# Patient Record
Sex: FEMALE | Race: WHITE | NOT HISPANIC OR LATINO | Employment: UNEMPLOYED | URBAN - METROPOLITAN AREA
[De-identification: names, ages, dates, MRNs, and addresses within clinical notes are randomized per-mention and may not be internally consistent; named-entity substitution may affect disease eponyms.]

---

## 2018-09-05 ENCOUNTER — APPOINTMENT (EMERGENCY)
Dept: RADIOLOGY | Facility: HOSPITAL | Age: 53
End: 2018-09-05
Payer: SUBSIDIZED

## 2018-09-05 ENCOUNTER — HOSPITAL ENCOUNTER (EMERGENCY)
Facility: HOSPITAL | Age: 53
Discharge: HOME | End: 2018-09-05
Attending: EMERGENCY MEDICINE | Admitting: EMERGENCY MEDICINE
Payer: SUBSIDIZED

## 2018-09-05 VITALS
HEART RATE: 78 BPM | RESPIRATION RATE: 22 BRPM | TEMPERATURE: 97.7 F | WEIGHT: 115 LBS | OXYGEN SATURATION: 95 % | HEIGHT: 62 IN | SYSTOLIC BLOOD PRESSURE: 133 MMHG | BODY MASS INDEX: 21.16 KG/M2 | DIASTOLIC BLOOD PRESSURE: 78 MMHG

## 2018-09-05 DIAGNOSIS — J44.1 COPD EXACERBATION (HCC): Primary | ICD-10-CM

## 2018-09-05 LAB
ANION GAP SERPL CALCULATED.3IONS-SCNC: 14 MMOL/L (ref 4–13)
BASOPHILS # BLD AUTO: 0.06 THOUSANDS/ΜL (ref 0–0.1)
BASOPHILS NFR BLD AUTO: 1 % (ref 0–1)
BUN SERPL-MCNC: 8 MG/DL (ref 5–25)
CALCIUM SERPL-MCNC: 8.6 MG/DL (ref 8.3–10.1)
CHLORIDE SERPL-SCNC: 102 MMOL/L (ref 100–108)
CO2 SERPL-SCNC: 23 MMOL/L (ref 21–32)
CREAT SERPL-MCNC: 0.43 MG/DL (ref 0.6–1.3)
EOSINOPHIL # BLD AUTO: 0.19 THOUSAND/ΜL (ref 0–0.61)
EOSINOPHIL NFR BLD AUTO: 2 % (ref 0–6)
ERYTHROCYTE [DISTWIDTH] IN BLOOD BY AUTOMATED COUNT: 12.5 % (ref 11.6–15.1)
GFR SERPL CREATININE-BSD FRML MDRD: 116 ML/MIN/1.73SQ M
GLUCOSE SERPL-MCNC: 88 MG/DL (ref 65–140)
HCT VFR BLD AUTO: 42.1 % (ref 34.8–46.1)
HGB BLD-MCNC: 13.8 G/DL (ref 11.5–15.4)
IMM GRANULOCYTES # BLD AUTO: 0.02 THOUSAND/UL (ref 0–0.2)
IMM GRANULOCYTES NFR BLD AUTO: 0 % (ref 0–2)
LYMPHOCYTES # BLD AUTO: 3.4 THOUSANDS/ΜL (ref 0.6–4.47)
LYMPHOCYTES NFR BLD AUTO: 38 % (ref 14–44)
MCH RBC QN AUTO: 33.5 PG (ref 26.8–34.3)
MCHC RBC AUTO-ENTMCNC: 32.8 G/DL (ref 31.4–37.4)
MCV RBC AUTO: 102 FL (ref 82–98)
MONOCYTES # BLD AUTO: 0.61 THOUSAND/ΜL (ref 0.17–1.22)
MONOCYTES NFR BLD AUTO: 7 % (ref 4–12)
NEUTROPHILS # BLD AUTO: 4.61 THOUSANDS/ΜL (ref 1.85–7.62)
NEUTS SEG NFR BLD AUTO: 52 % (ref 43–75)
NRBC BLD AUTO-RTO: 0 /100 WBCS
PLATELET # BLD AUTO: 277 THOUSANDS/UL (ref 149–390)
PMV BLD AUTO: 9.8 FL (ref 8.9–12.7)
POTASSIUM SERPL-SCNC: 4 MMOL/L (ref 3.5–5.3)
RBC # BLD AUTO: 4.12 MILLION/UL (ref 3.81–5.12)
SODIUM SERPL-SCNC: 139 MMOL/L (ref 136–145)
TROPONIN I SERPL-MCNC: <0.02 NG/ML
WBC # BLD AUTO: 8.89 THOUSAND/UL (ref 4.31–10.16)

## 2018-09-05 PROCEDURE — 96374 THER/PROPH/DIAG INJ IV PUSH: CPT

## 2018-09-05 PROCEDURE — 94640 AIRWAY INHALATION TREATMENT: CPT

## 2018-09-05 PROCEDURE — 99284 EMERGENCY DEPT VISIT MOD MDM: CPT

## 2018-09-05 PROCEDURE — 85025 COMPLETE CBC W/AUTO DIFF WBC: CPT | Performed by: EMERGENCY MEDICINE

## 2018-09-05 PROCEDURE — 93005 ELECTROCARDIOGRAM TRACING: CPT

## 2018-09-05 PROCEDURE — 84484 ASSAY OF TROPONIN QUANT: CPT | Performed by: EMERGENCY MEDICINE

## 2018-09-05 PROCEDURE — 36415 COLL VENOUS BLD VENIPUNCTURE: CPT | Performed by: EMERGENCY MEDICINE

## 2018-09-05 PROCEDURE — 80048 BASIC METABOLIC PNL TOTAL CA: CPT | Performed by: EMERGENCY MEDICINE

## 2018-09-05 PROCEDURE — 71045 X-RAY EXAM CHEST 1 VIEW: CPT

## 2018-09-05 RX ORDER — METHYLPREDNISOLONE SODIUM SUCCINATE 125 MG/2ML
125 INJECTION, POWDER, LYOPHILIZED, FOR SOLUTION INTRAMUSCULAR; INTRAVENOUS ONCE
Status: COMPLETED | OUTPATIENT
Start: 2018-09-05 | End: 2018-09-05

## 2018-09-05 RX ORDER — PREDNISONE 20 MG/1
40 TABLET ORAL DAILY
Qty: 10 TABLET | Refills: 0 | Status: SHIPPED | OUTPATIENT
Start: 2018-09-05 | End: 2018-09-10

## 2018-09-05 RX ORDER — IPRATROPIUM BROMIDE AND ALBUTEROL SULFATE 2.5; .5 MG/3ML; MG/3ML
3 SOLUTION RESPIRATORY (INHALATION) ONCE
Status: COMPLETED | OUTPATIENT
Start: 2018-09-05 | End: 2018-09-05

## 2018-09-05 RX ORDER — ALBUTEROL SULFATE 90 UG/1
2 AEROSOL, METERED RESPIRATORY (INHALATION) EVERY 6 HOURS PRN
Qty: 6.7 G | Refills: 0 | Status: SHIPPED | OUTPATIENT
Start: 2018-09-05 | End: 2021-05-28 | Stop reason: SDUPTHER

## 2018-09-05 RX ORDER — ALBUTEROL SULFATE 90 UG/1
2 AEROSOL, METERED RESPIRATORY (INHALATION) ONCE
Status: COMPLETED | OUTPATIENT
Start: 2018-09-05 | End: 2018-09-05

## 2018-09-05 RX ADMIN — IPRATROPIUM BROMIDE AND ALBUTEROL SULFATE 3 ML: .5; 3 SOLUTION RESPIRATORY (INHALATION) at 18:09

## 2018-09-05 RX ADMIN — METHYLPREDNISOLONE SODIUM SUCCINATE 125 MG: 125 INJECTION, POWDER, FOR SOLUTION INTRAMUSCULAR; INTRAVENOUS at 18:09

## 2018-09-05 RX ADMIN — ALBUTEROL SULFATE 2 PUFF: 90 AEROSOL, METERED RESPIRATORY (INHALATION) at 20:20

## 2018-09-05 NOTE — ED NOTES
Patient phoned family members for a ride home  ER MD spoke with patient in regards to being discharged to home       Luciano Morin RN  09/05/18 5335
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
## 2018-09-06 LAB
ATRIAL RATE: 70 BPM
P AXIS: 77 DEGREES
PR INTERVAL: 158 MS
QRS AXIS: 16 DEGREES
QRSD INTERVAL: 66 MS
QT INTERVAL: 430 MS
QTC INTERVAL: 464 MS
T WAVE AXIS: 54 DEGREES
VENTRICULAR RATE: 70 BPM

## 2018-09-06 PROCEDURE — 93010 ELECTROCARDIOGRAM REPORT: CPT | Performed by: INTERNAL MEDICINE

## 2018-09-06 NOTE — DISCHARGE INSTRUCTIONS
COPD (Chronic Obstructive Pulmonary Disease)   WHAT YOU NEED TO KNOW:   Chronic obstructive pulmonary disease (COPD) is a lung disease that makes it hard for you to breathe  It is usually a result of lung damage caused by years of irritation and inflammation in your lungs  DISCHARGE INSTRUCTIONS:   Call 911 if:   · You feel lightheaded, short of breath, and have chest pain  Seek care immediately if:   · You are confused, dizzy, or feel faint  · Your arm or leg feels warm, tender, and painful  It may look swollen and red  · You cough up blood  Contact your healthcare provider if:   · You have more shortness of breath than usual      · You need more medicine than usual to control your symptoms  · You are coughing or wheezing more than usual      · You are coughing up more mucus, or it is a different color or has a different odor  · You gain more than 3 pounds in a week  · You have a fever, a runny or stuffy nose, and a sore throat, or other cold or flu symptoms  · Your skin, lips, or nails start to turn blue  · You have swelling in your legs or ankles  · You are very tired or weak for more than a day  · You notice changes in your mood, or changes in your ability to think or concentrate  · You have questions or concerns about your condition or care  Medicines:   · Medicines  may be used to open your airways, decrease swelling and inflammation in your lungs, or treat an infection  You may need 2 or more medicines  A short-acting medicine relieves symptoms quickly  Long-acting medicines will control or prevent symptoms  Ask for more information about the medicines you are given and how to use them safely  · Take your medicine as directed  Contact your healthcare provider if you think your medicine is not helping or if you have side effects  Tell him or her if you are allergic to any medicine  Keep a list of the medicines, vitamins, and herbs you take   Include the amounts, and when and why you take them  Bring the list or the pill bottles to follow-up visits  Carry your medicine list with you in case of an emergency  Help make breathing easier:   · Use pursed-lip breathing any time you feel short of breath  Take a deep breath in through your nose  Slowly breathe out through your mouth with your lips pursed for twice as long as you inhaled  You can also practice this breathing pattern while you bend, lift, climb stairs, or exercise  It slows down your breathing and helps move more air in and out of your lungs  · Do not smoke, and avoid others who smoke  Nicotine and other substances can cause lung irritation or damage and make it harder for you to breathe  Do not use e-cigarettes or smokeless tobacco  They still contain nicotine  Ask your healthcare provider for information if you currently smoke and need help to quit  For support and more information:  ¨ [employer]  Phone: 9- 929 - 329-3656  Web Address: HelpMeNow      · Be aware of and avoid anything that makes your symptoms worse  Stay out of high altitudes and places with high humidity  Stay inside, or cover your mouth and nose with a scarf when you are outside during cold weather  Stay inside on days when air pollution or pollen counts are high  Do not use aerosol sprays such as deodorant, bug spray, and hair spray  Manage COPD and help prevent exacerbations:  COPD is a serious condition that gets worse over time  A COPD exacerbation means your symptoms suddenly get worse  It is important to prevent exacerbations  An exacerbation can cause more lung damage  COPD cannot be cured, but you can take action to feel better and prevent COPD exacerbations:  · Protect yourself from germs  Germs can get into your lungs and cause an infection  An infection in your lungs can create more mucus and make it harder to breathe   An infection can also create swelling in your airways and prevent air from getting in  You can decrease your risk for infection by doing the following:     [facility] your hands often with soap and water  Carry germ-killing gel with you  You can use the gel to clean your hands when soap and water are not available  ¨ Do not touch your eyes, nose, or mouth unless you have washed your hands first      ¨ Always cover your mouth when you cough  Cough into a tissue or your shirtsleeve so you do not spread germs from your hands  ¨ Try to avoid people who have a cold or the flu  If you are sick, stay away from others as much as possible  · Drink more liquids  This will help to keep your air passages moist and help you cough up mucus  Ask how much liquid to drink each day and which liquids are best for you  · Exercise daily  Exercise for at least 20 minutes each day to help increase your energy and decrease shortness of breath  Walking or riding a bike are good ways to exercise  Talk to your healthcare provider about the best exercise plan for you  · Ask about vaccines  Your healthcare provider may recommend that you get regular flu and pneumonia vaccines  Pneumonia can become life-threatening for a person who has COPD  Ask about other vaccines you may need  Ask your healthcare provider about the flu and pneumonia vaccines  All adults should get the flu (influenza) vaccine every year as soon as it becomes available  The pneumonia vaccine is given to adults aged 72 or older to prevent pneumococcal disease, such as pneumonia  Adults aged 23 to 59 years who are at high risk for pneumococcal disease also should get the pneumococcal vaccine  It may need to be repeated 1 or 5 years later  Pulmonary rehabilitation:  Your healthcare provider may recommend a program to help you manage your symptoms and improve your quality of life  It may include nutritional counseling and exercise to strengthen your lungs     Make decisions about your choices for future treatment:  Ask for information about advanced medical directives and living pozo  These documents help you decide and write down your choices for treatment and end-of-life care  It is best to complete them when you feel well and can think clearly about your wishes  The information can then be kept for future use if you are in the hospital or become very ill  Follow up with your healthcare provider as directed: You may need more tests  Your healthcare provider may refer you to a pulmonary (lung) specialist  Write down your questions so you remember to ask them during your visits  © 2017 [address] [employer] Information is for End User's use only and may not be sold, redistributed or otherwise used for commercial purposes  All illustrations and images included in CareNotes® are the copyrighted property of [employer]  or [employer]  The above information is an  only  It is not intended as medical advice for individual conditions or treatments  Talk to your doctor, nurse or pharmacist before following any medical regimen to see if it is safe and effective for you

## 2020-02-13 ENCOUNTER — HOSPITAL ENCOUNTER (EMERGENCY)
Facility: HOSPITAL | Age: 55
Discharge: HOME | End: 2020-02-14
Attending: EMERGENCY MEDICINE | Admitting: EMERGENCY MEDICINE
Payer: COMMERCIAL

## 2020-02-13 DIAGNOSIS — J44.1 COPD EXACERBATION (HCC): Primary | ICD-10-CM

## 2020-02-13 DIAGNOSIS — F10.929 ALCOHOL INTOXICATION (HCC): ICD-10-CM

## 2020-02-13 PROCEDURE — 99285 EMERGENCY DEPT VISIT HI MDM: CPT

## 2020-02-13 PROCEDURE — 99285 EMERGENCY DEPT VISIT HI MDM: CPT | Performed by: EMERGENCY MEDICINE

## 2020-02-13 PROCEDURE — 94640 AIRWAY INHALATION TREATMENT: CPT

## 2020-02-13 RX ORDER — METHYLPREDNISOLONE SODIUM SUCCINATE 125 MG/2ML
80 INJECTION, POWDER, LYOPHILIZED, FOR SOLUTION INTRAMUSCULAR; INTRAVENOUS ONCE
Status: COMPLETED | OUTPATIENT
Start: 2020-02-14 | End: 2020-02-14

## 2020-02-13 RX ORDER — SERTRALINE HYDROCHLORIDE 100 MG/1
150 TABLET, FILM COATED ORAL 2 TIMES DAILY
COMMUNITY
End: 2020-11-17 | Stop reason: ALTCHOICE

## 2020-02-13 RX ORDER — ALBUTEROL SULFATE 2.5 MG/3ML
5 SOLUTION RESPIRATORY (INHALATION) ONCE
Status: COMPLETED | OUTPATIENT
Start: 2020-02-13 | End: 2020-02-13

## 2020-02-13 RX ADMIN — ALBUTEROL SULFATE 5 MG: 2.5 SOLUTION RESPIRATORY (INHALATION) at 23:45

## 2020-02-13 RX ADMIN — IPRATROPIUM BROMIDE 0.5 MG: 0.5 SOLUTION RESPIRATORY (INHALATION) at 23:45

## 2020-02-14 ENCOUNTER — APPOINTMENT (EMERGENCY)
Dept: RADIOLOGY | Facility: HOSPITAL | Age: 55
End: 2020-02-14
Payer: COMMERCIAL

## 2020-02-14 VITALS
WEIGHT: 115.3 LBS | HEART RATE: 93 BPM | RESPIRATION RATE: 22 BRPM | DIASTOLIC BLOOD PRESSURE: 87 MMHG | BODY MASS INDEX: 21.09 KG/M2 | TEMPERATURE: 97.8 F | OXYGEN SATURATION: 98 % | SYSTOLIC BLOOD PRESSURE: 135 MMHG

## 2020-02-14 LAB
ALBUMIN SERPL BCP-MCNC: 3.8 G/DL (ref 3.5–5)
ALP SERPL-CCNC: 75 U/L (ref 46–116)
ALT SERPL W P-5'-P-CCNC: 25 U/L (ref 12–78)
ANION GAP SERPL CALCULATED.3IONS-SCNC: 11 MMOL/L (ref 4–13)
AST SERPL W P-5'-P-CCNC: 20 U/L (ref 5–45)
ATRIAL RATE: 88 BPM
BASOPHILS # BLD AUTO: 0.05 THOUSANDS/ΜL (ref 0–0.1)
BASOPHILS NFR BLD AUTO: 1 % (ref 0–1)
BILIRUB SERPL-MCNC: 0.2 MG/DL (ref 0.2–1)
BUN SERPL-MCNC: 6 MG/DL (ref 5–25)
CALCIUM SERPL-MCNC: 8.9 MG/DL (ref 8.3–10.1)
CHLORIDE SERPL-SCNC: 106 MMOL/L (ref 100–108)
CO2 SERPL-SCNC: 28 MMOL/L (ref 21–32)
CREAT SERPL-MCNC: 0.49 MG/DL (ref 0.6–1.3)
EOSINOPHIL # BLD AUTO: 0.13 THOUSAND/ΜL (ref 0–0.61)
EOSINOPHIL NFR BLD AUTO: 2 % (ref 0–6)
ERYTHROCYTE [DISTWIDTH] IN BLOOD BY AUTOMATED COUNT: 12.7 % (ref 11.6–15.1)
ETHANOL SERPL-MCNC: 202 MG/DL (ref 0–3)
FLUAV RNA NPH QL NAA+PROBE: NORMAL
FLUBV RNA NPH QL NAA+PROBE: NORMAL
GFR SERPL CREATININE-BSD FRML MDRD: 109 ML/MIN/1.73SQ M
GLUCOSE SERPL-MCNC: 94 MG/DL (ref 65–140)
HCT VFR BLD AUTO: 48.1 % (ref 34.8–46.1)
HGB BLD-MCNC: 16.1 G/DL (ref 11.5–15.4)
IMM GRANULOCYTES # BLD AUTO: 0.01 THOUSAND/UL (ref 0–0.2)
IMM GRANULOCYTES NFR BLD AUTO: 0 % (ref 0–2)
LYMPHOCYTES # BLD AUTO: 3.23 THOUSANDS/ΜL (ref 0.6–4.47)
LYMPHOCYTES NFR BLD AUTO: 44 % (ref 14–44)
MCH RBC QN AUTO: 33.5 PG (ref 26.8–34.3)
MCHC RBC AUTO-ENTMCNC: 33.5 G/DL (ref 31.4–37.4)
MCV RBC AUTO: 100 FL (ref 82–98)
MONOCYTES # BLD AUTO: 0.6 THOUSAND/ΜL (ref 0.17–1.22)
MONOCYTES NFR BLD AUTO: 8 % (ref 4–12)
NEUTROPHILS # BLD AUTO: 3.37 THOUSANDS/ΜL (ref 1.85–7.62)
NEUTS SEG NFR BLD AUTO: 45 % (ref 43–75)
NRBC BLD AUTO-RTO: 0 /100 WBCS
P AXIS: 76 DEGREES
PLATELET # BLD AUTO: 300 THOUSANDS/UL (ref 149–390)
PMV BLD AUTO: 9.9 FL (ref 8.9–12.7)
POTASSIUM SERPL-SCNC: 3.7 MMOL/L (ref 3.5–5.3)
PR INTERVAL: 152 MS
PROT SERPL-MCNC: 7.1 G/DL (ref 6.4–8.2)
QRS AXIS: 4 DEGREES
QRSD INTERVAL: 60 MS
QT INTERVAL: 396 MS
QTC INTERVAL: 479 MS
RBC # BLD AUTO: 4.81 MILLION/UL (ref 3.81–5.12)
RSV RNA NPH QL NAA+PROBE: NORMAL
SODIUM SERPL-SCNC: 145 MMOL/L (ref 136–145)
T WAVE AXIS: 54 DEGREES
TROPONIN I SERPL-MCNC: <0.02 NG/ML
VENTRICULAR RATE: 88 BPM
WBC # BLD AUTO: 7.39 THOUSAND/UL (ref 4.31–10.16)

## 2020-02-14 PROCEDURE — 80320 DRUG SCREEN QUANTALCOHOLS: CPT | Performed by: EMERGENCY MEDICINE

## 2020-02-14 PROCEDURE — 93005 ELECTROCARDIOGRAM TRACING: CPT

## 2020-02-14 PROCEDURE — 96374 THER/PROPH/DIAG INJ IV PUSH: CPT

## 2020-02-14 PROCEDURE — 93010 ELECTROCARDIOGRAM REPORT: CPT | Performed by: INTERNAL MEDICINE

## 2020-02-14 PROCEDURE — 80053 COMPREHEN METABOLIC PANEL: CPT | Performed by: EMERGENCY MEDICINE

## 2020-02-14 PROCEDURE — 71045 X-RAY EXAM CHEST 1 VIEW: CPT

## 2020-02-14 PROCEDURE — 84484 ASSAY OF TROPONIN QUANT: CPT | Performed by: EMERGENCY MEDICINE

## 2020-02-14 PROCEDURE — 36415 COLL VENOUS BLD VENIPUNCTURE: CPT | Performed by: EMERGENCY MEDICINE

## 2020-02-14 PROCEDURE — 85025 COMPLETE CBC W/AUTO DIFF WBC: CPT | Performed by: EMERGENCY MEDICINE

## 2020-02-14 PROCEDURE — 87631 RESP VIRUS 3-5 TARGETS: CPT | Performed by: EMERGENCY MEDICINE

## 2020-02-14 PROCEDURE — 96361 HYDRATE IV INFUSION ADD-ON: CPT

## 2020-02-14 RX ORDER — PREDNISONE 20 MG/1
40 TABLET ORAL DAILY
Qty: 10 TABLET | Refills: 0 | Status: SHIPPED | OUTPATIENT
Start: 2020-02-14 | End: 2020-02-19

## 2020-02-14 RX ADMIN — METHYLPREDNISOLONE SODIUM SUCCINATE 80 MG: 125 INJECTION, POWDER, FOR SOLUTION INTRAMUSCULAR; INTRAVENOUS at 00:03

## 2020-02-14 RX ADMIN — SODIUM CHLORIDE 500 ML: 0.9 INJECTION, SOLUTION INTRAVENOUS at 00:38

## 2020-02-14 NOTE — ED PROVIDER NOTES
History  Chief Complaint   Patient presents with    Breathing Difficulty     patient c/o breathing difficulty, has had a cold for a few days, has not heat at home, has a history of copd, was taken off her anxiety and sleep medications a month and half ago, and has been falling, depression,      63 yo female c/o sob off and on but worse tonight   + chest tightness  + cough and congestion  No fever or vomiting   + drinking alcohol tonight  Says she has not felt well since [facility] took her off her anxiety meds  + smoker  History provided by:  Patient   used: No        Prior to Admission Medications   Prescriptions Last Dose Informant Patient Reported? Taking? albuterol (PROVENTIL HFA,VENTOLIN HFA) 90 mcg/act inhaler Not Taking at Unknown time  No No   Sig: Inhale 2 puffs every 6 (six) hours as needed for wheezing for up to 14 doses   Patient not taking: Reported on 2/13/2020   sertraline (ZOLOFT) 100 mg tablet   Yes Yes   Sig: Take 100 mg by mouth daily      Facility-Administered Medications: None       Past Medical History:   Diagnosis Date    Anxiety     COPD (chronic obstructive pulmonary disease) ([facility] [address] )     Depression        Past Surgical History:   Procedure Laterality Date    CHOLECYSTECTOMY         History reviewed  No pertinent family history  I have reviewed and agree with the history as documented  Social History     Tobacco Use    Smoking status: Current Every Day Smoker     Packs/day: 0 50     Types: Cigarettes    Smokeless tobacco: Never Used   Substance Use Topics    Alcohol use: Yes     Comment: occas    Drug use: No       Review of Systems   Constitutional: Negative  Negative for fever  HENT: Positive for congestion  Eyes: Negative  Respiratory: Positive for cough, shortness of breath and wheezing  Cardiovascular: Negative  Negative for chest pain  Gastrointestinal: Negative  Negative for abdominal pain, diarrhea, nausea and vomiting  Genitourinary: Negative  Negative for dysuria and flank pain  Musculoskeletal: Negative  Negative for back pain and myalgias  Skin: Negative  Negative for rash and wound  Neurological: Negative  Negative for dizziness and headaches  Hematological: Does not bruise/bleed easily  Psychiatric/Behavioral: The patient is nervous/anxious  All other systems reviewed and are negative  Physical Exam  Physical Exam   Constitutional: She appears well-developed and well-nourished  No distress    + AOB   HENT:   Head: Normocephalic and atraumatic  Eyes: Pupils are equal, round, and reactive to light  Conjunctivae are normal  No scleral icterus  Neck: Normal range of motion  Neck supple  Cardiovascular: Normal rate, regular rhythm and normal heart sounds  No murmur heard  Pulmonary/Chest: Effort normal  No respiratory distress  She has decreased breath sounds  She has wheezes  Abdominal: Soft  Bowel sounds are normal  She exhibits no distension  There is no tenderness  Musculoskeletal: Normal range of motion  She exhibits no edema or deformity  Neurological: She is alert  No cranial nerve deficit  She exhibits normal muscle tone  Speech/motor intact   Skin: Skin is warm and dry  No rash noted  She is not diaphoretic  No pallor  Psychiatric: She has a normal mood and affect  Her behavior is normal    Nursing note and vitals reviewed        Vital Signs  ED Triage Vitals   Temperature Pulse Respirations Blood Pressure SpO2   02/13/20 2345 02/13/20 2342 02/13/20 2342 02/13/20 2342 02/13/20 2343   97 8 °F (36 6 °C) 96 (!) 24 120/64 93 %      Temp Source Heart Rate Source Patient Position - Orthostatic VS BP Location FiO2 (%)   02/13/20 2345 02/13/20 2342 02/13/20 2342 02/13/20 2342 --   Oral Monitor Lying Right arm       Pain Score       --                  Vitals:    02/14/20 0215 02/14/20 0430 02/14/20 0500 02/14/20 0514   BP:    135/87   Pulse: 80 76 82 93   Patient Position - Orthostatic VS:    Sitting         Visual Acuity      ED Medications  Medications   albuterol inhalation solution 5 mg (5 mg Nebulization Given 2/13/20 2345)     And   ipratropium (ATROVENT) 0 02 % inhalation solution 0 5 mg (0 5 mg Nebulization Given 2/13/20 2345)   methylPREDNISolone sodium succinate (Solu-MEDROL) injection 80 mg (80 mg Intravenous Given 2/14/20 0003)   sodium chloride 0 9 % bolus 500 mL (0 mL Intravenous Stopped 2/14/20 0202)       Diagnostic Studies  Results Reviewed     Procedure Component Value Units Date/Time    Influenza A/B and RSV PCR [[id number]]  (Normal) Collected:  02/14/20 0007    Lab Status:  Final result Specimen:  Nose Updated:  02/14/20 0049     INFLUENZA A PCR None Detected     INFLUENZA B PCR None Detected     RSV PCR None Detected    Troponin I [[id number]]  (Normal) Collected:  02/14/20 0008    Lab Status:  Final result Specimen:  Blood from Arm, Right Updated:  02/14/20 0035     Troponin I <0 02 ng/mL     Comprehensive metabolic panel [[id number]]  (Abnormal) Collected:  02/14/20 0008    Lab Status:  Final result Specimen:  Blood from Arm, Right Updated:  02/14/20 0032     Sodium 145 mmol/L      Potassium 3 7 mmol/L      Chloride 106 mmol/L      CO2 28 mmol/L      ANION GAP 11 mmol/L      BUN 6 mg/dL      Creatinine 0 49 mg/dL      Glucose 94 mg/dL      Calcium 8 9 mg/dL      AST 20 U/L      ALT 25 U/L      Alkaline Phosphatase 75 U/L      Total Protein 7 1 g/dL      Albumin 3 8 g/dL      Total Bilirubin 0 20 mg/dL      eGFR 109 ml/min/1 73sq m     Narrative:       [place] guidelines for Chronic Kidney Disease (CKD):     Stage 1 with normal or high GFR (GFR > 90 mL/min/1 73 square meters)    Stage 2 Mild CKD (GFR = 60-89 mL/min/1 73 square meters)    Stage 3A Moderate CKD (GFR = 45-59 mL/min/1 73 square meters)    Stage 3B Moderate CKD (GFR = 30-44 mL/min/1 73 square meters)    Stage 4 Severe CKD (GFR = 15-29 mL/min/1 73 square meters)    Stage 5 End Stage CKD (GFR <15 mL/min/1 73 square meters)  Note: GFR calculation is accurate only with a steady state creatinine    Ethanol [[id number]]  (Abnormal) Collected:  02/14/20 0008    Lab Status:  Final result Specimen:  Blood from Arm, Right Updated:  02/14/20 0032     Ethanol Lvl 202 mg/dL     CBC and differential [[id number]]  (Abnormal) Collected:  02/14/20 0008    Lab Status:  Final result Specimen:  Blood from Arm, Right Updated:  02/14/20 0016     WBC 7 39 Thousand/uL      RBC 4 81 Million/uL      Hemoglobin 16 1 g/dL      Hematocrit 48 1 %       fL      MCH 33 5 pg      MCHC 33 5 g/dL      RDW 12 7 %      MPV 9 9 fL      Platelets 969 Thousands/uL      nRBC 0 /100 WBCs      Neutrophils Relative 45 %      Immat GRANS % 0 %      Lymphocytes Relative 44 %      Monocytes Relative 8 %      Eosinophils Relative 2 %      Basophils Relative 1 %      Neutrophils Absolute 3 37 Thousands/µL      Immature Grans Absolute 0 01 Thousand/uL      Lymphocytes Absolute 3 23 Thousands/µL      Monocytes Absolute 0 60 Thousand/µL      Eosinophils Absolute 0 13 Thousand/µL      Basophils Absolute 0 05 Thousands/µL                  XR chest 1 view portable   Final Result by Amol Villalobos MD (02/14 0130)      No acute cardiopulmonary disease              Workstation performed: [id number]                    Procedures  ECG 12 Lead Documentation Only  Date/Time: 2/14/2020 12:26 AM  Performed by: Octavio Roman MD  Authorized by: Octavio Roman MD     Indications / Diagnosis:  Sob  ECG reviewed by me, the ED Provider: yes    Patient location:  ED  Previous ECG:     Previous ECG:  Unavailable  Interpretation:     Interpretation: abnormal    Rate:     ECG rate:  88    ECG rate assessment: normal    Rhythm:     Rhythm: sinus rhythm    Ectopy:     Ectopy: none    QRS:     QRS axis:  Normal  Conduction:     Conduction: normal    ST segments:     ST segments:  Normal  T waves:     T waves: normal    Q waves:     Q waves:  V1 and V2 ED Course                               MDM  Number of Diagnoses or Management Options  Alcohol intoxication ([facility] [address] ):   COPD exacerbation [facility]):   Diagnosis management comments: 0025 - aeration and wheezing improved post neb  Labs pending  Pt  Doesn't have a ride home tonight  56 - signed out to night doctor due to end of shift pending sobriety and ride home in the morning  Disposition  Final diagnoses:   COPD exacerbation ([facility] [address] )   Alcohol intoxication ([facility] )     Time reflects when diagnosis was documented in both MDM as applicable and the Disposition within this note     Time User Action Codes Description Comment    9/48/6034 62:89 AM Venkat ESPINOZA Add [M19 3] COPD exacerbation ([facility] )     5/06/3650 61:28 AM Angie Morgan Add [I80 311] Alcohol intoxication [facility])       ED Disposition     ED Disposition Condition Date/Time Comment    Discharge Stable Fri Feb 14, 2020  5:07 AM Karen Pugh discharge to home/self care  Follow-up Information     Follow up With Specialties Details Why Contact Info    [employer]  In 3 days  [phone]            Discharge Medication List as of 2/14/2020  5:08 AM      START taking these medications    Details   predniSONE 20 mg tablet Take 2 tablets (40 mg total) by mouth daily for 5 days, Starting Fri 2/14/2020, Until Wed 2/19/2020, Print         CONTINUE these medications which have NOT CHANGED    Details   sertraline (ZOLOFT) 100 mg tablet Take 100 mg by mouth daily, Historical Med      albuterol (PROVENTIL HFA,VENTOLIN HFA) 90 mcg/act inhaler Inhale 2 puffs every 6 (six) hours as needed for wheezing for up to 14 doses, Starting Wed 9/5/2018, Print           No discharge procedures on file      PDMP Review     None          ED Provider  Electronically Signed by           Julien Hahn MD  34/84/56 8738       Julien Hahn MD  33/76/74 7375

## 2020-03-23 ENCOUNTER — HOSPITAL ENCOUNTER (EMERGENCY)
Facility: HOSPITAL | Age: 55
Discharge: OTHER INSTITUTION | End: 2020-03-24
Attending: EMERGENCY MEDICINE | Admitting: EMERGENCY MEDICINE
Payer: COMMERCIAL

## 2020-03-23 DIAGNOSIS — F32.A DEPRESSION WITH SUICIDAL IDEATION: Primary | ICD-10-CM

## 2020-03-23 DIAGNOSIS — R45.851 DEPRESSION WITH SUICIDAL IDEATION: Primary | ICD-10-CM

## 2020-03-23 LAB
ALBUMIN SERPL BCP-MCNC: 3.9 G/DL (ref 3.5–5)
ALP SERPL-CCNC: 72 U/L (ref 46–116)
ALT SERPL W P-5'-P-CCNC: 26 U/L (ref 12–78)
AMPHETAMINES SERPL QL SCN: POSITIVE
ANION GAP SERPL CALCULATED.3IONS-SCNC: 13 MMOL/L (ref 4–13)
AST SERPL W P-5'-P-CCNC: 20 U/L (ref 5–45)
BACTERIA UR QL AUTO: ABNORMAL /HPF
BARBITURATES UR QL: NEGATIVE
BASOPHILS # BLD AUTO: 0.07 THOUSANDS/ΜL (ref 0–0.1)
BASOPHILS NFR BLD AUTO: 1 % (ref 0–1)
BENZODIAZ UR QL: NEGATIVE
BILIRUB SERPL-MCNC: 0.3 MG/DL (ref 0.2–1)
BILIRUB UR QL STRIP: NEGATIVE
BUN SERPL-MCNC: 10 MG/DL (ref 5–25)
CALCIUM SERPL-MCNC: 8.6 MG/DL (ref 8.3–10.1)
CHLORIDE SERPL-SCNC: 101 MMOL/L (ref 100–108)
CLARITY UR: CLEAR
CO2 SERPL-SCNC: 23 MMOL/L (ref 21–32)
COCAINE UR QL: NEGATIVE
COLOR UR: YELLOW
CREAT SERPL-MCNC: 0.56 MG/DL (ref 0.6–1.3)
EOSINOPHIL # BLD AUTO: 0.2 THOUSAND/ΜL (ref 0–0.61)
EOSINOPHIL NFR BLD AUTO: 2 % (ref 0–6)
ERYTHROCYTE [DISTWIDTH] IN BLOOD BY AUTOMATED COUNT: 12.2 % (ref 11.6–15.1)
ETHANOL SERPL-MCNC: 304 MG/DL (ref 0–3)
EXT PREG TEST URINE: NEGATIVE
EXT. CONTROL ED NAV: NORMAL
GFR SERPL CREATININE-BSD FRML MDRD: 105 ML/MIN/1.73SQ M
GLUCOSE SERPL-MCNC: 111 MG/DL (ref 65–140)
GLUCOSE UR STRIP-MCNC: NEGATIVE MG/DL
HCT VFR BLD AUTO: 46 % (ref 34.8–46.1)
HGB BLD-MCNC: 15.3 G/DL (ref 11.5–15.4)
HGB UR QL STRIP.AUTO: ABNORMAL
IMM GRANULOCYTES # BLD AUTO: 0.03 THOUSAND/UL (ref 0–0.2)
IMM GRANULOCYTES NFR BLD AUTO: 0 % (ref 0–2)
KETONES UR STRIP-MCNC: NEGATIVE MG/DL
LEUKOCYTE ESTERASE UR QL STRIP: NEGATIVE
LIPASE SERPL-CCNC: 209 U/L (ref 73–393)
LYMPHOCYTES # BLD AUTO: 4.54 THOUSANDS/ΜL (ref 0.6–4.47)
LYMPHOCYTES NFR BLD AUTO: 43 % (ref 14–44)
MCH RBC QN AUTO: 33.8 PG (ref 26.8–34.3)
MCHC RBC AUTO-ENTMCNC: 33.3 G/DL (ref 31.4–37.4)
MCV RBC AUTO: 102 FL (ref 82–98)
METHADONE UR QL: NEGATIVE
MONOCYTES # BLD AUTO: 1.06 THOUSAND/ΜL (ref 0.17–1.22)
MONOCYTES NFR BLD AUTO: 10 % (ref 4–12)
NEUTROPHILS # BLD AUTO: 4.7 THOUSANDS/ΜL (ref 1.85–7.62)
NEUTS SEG NFR BLD AUTO: 44 % (ref 43–75)
NITRITE UR QL STRIP: NEGATIVE
NON-SQ EPI CELLS URNS QL MICRO: ABNORMAL /HPF
NRBC BLD AUTO-RTO: 0 /100 WBCS
OPIATES UR QL SCN: NEGATIVE
PCP UR QL: NEGATIVE
PH UR STRIP.AUTO: 5.5 [PH]
PLATELET # BLD AUTO: 324 THOUSANDS/UL (ref 149–390)
PMV BLD AUTO: 8.8 FL (ref 8.9–12.7)
POTASSIUM SERPL-SCNC: 3.7 MMOL/L (ref 3.5–5.3)
PROT SERPL-MCNC: 7.3 G/DL (ref 6.4–8.2)
PROT UR STRIP-MCNC: NEGATIVE MG/DL
RBC # BLD AUTO: 4.53 MILLION/UL (ref 3.81–5.12)
RBC #/AREA URNS AUTO: ABNORMAL /HPF
SODIUM SERPL-SCNC: 137 MMOL/L (ref 136–145)
SP GR UR STRIP.AUTO: <=1.005 (ref 1–1.03)
THC UR QL: NEGATIVE
UROBILINOGEN UR QL STRIP.AUTO: 0.2 E.U./DL
WBC # BLD AUTO: 10.6 THOUSAND/UL (ref 4.31–10.16)
WBC #/AREA URNS AUTO: ABNORMAL /HPF

## 2020-03-23 PROCEDURE — 36415 COLL VENOUS BLD VENIPUNCTURE: CPT | Performed by: EMERGENCY MEDICINE

## 2020-03-23 PROCEDURE — 80320 DRUG SCREEN QUANTALCOHOLS: CPT | Performed by: EMERGENCY MEDICINE

## 2020-03-23 PROCEDURE — 81025 URINE PREGNANCY TEST: CPT | Performed by: EMERGENCY MEDICINE

## 2020-03-23 PROCEDURE — 80053 COMPREHEN METABOLIC PANEL: CPT | Performed by: EMERGENCY MEDICINE

## 2020-03-23 PROCEDURE — 85025 COMPLETE CBC W/AUTO DIFF WBC: CPT | Performed by: EMERGENCY MEDICINE

## 2020-03-23 PROCEDURE — 99283 EMERGENCY DEPT VISIT LOW MDM: CPT | Performed by: EMERGENCY MEDICINE

## 2020-03-23 PROCEDURE — 80307 DRUG TEST PRSMV CHEM ANLYZR: CPT | Performed by: EMERGENCY MEDICINE

## 2020-03-23 PROCEDURE — 83690 ASSAY OF LIPASE: CPT | Performed by: EMERGENCY MEDICINE

## 2020-03-23 PROCEDURE — 99285 EMERGENCY DEPT VISIT HI MDM: CPT

## 2020-03-23 PROCEDURE — 81001 URINALYSIS AUTO W/SCOPE: CPT | Performed by: EMERGENCY MEDICINE

## 2020-03-24 VITALS
OXYGEN SATURATION: 100 % | TEMPERATURE: 98.4 F | HEART RATE: 100 BPM | RESPIRATION RATE: 18 BRPM | DIASTOLIC BLOOD PRESSURE: 88 MMHG | SYSTOLIC BLOOD PRESSURE: 161 MMHG

## 2020-03-24 RX ORDER — NICOTINE 21 MG/24HR
21 PATCH, TRANSDERMAL 24 HOURS TRANSDERMAL ONCE
Status: DISCONTINUED | OUTPATIENT
Start: 2020-03-24 | End: 2020-03-24 | Stop reason: HOSPADM

## 2020-03-24 RX ADMIN — NICOTINE 21 MG: 21 PATCH, EXTENDED RELEASE TRANSDERMAL at 15:02

## 2020-03-24 NOTE — ED NOTES
Patient resting in room  No distress noted  Call bell in reach       Caroline Osorio RN  03/24/20 3662

## 2020-03-24 NOTE — ED NOTES
Patient in room resting at this time  Denies SI at this time  No complaints offered at this time  Patient awaits transportation to [address] at [address]  Patient is pleasant and cooperative with staff  Food was ordered from kitchen and brought to patient       Yair Savage RN  03/24/20 5130

## 2020-03-24 NOTE — ED NOTES
3/24/20 @ 0803:  [facility]; Eva Todd will discuss with supervisor  Marti Garcia, [place]    0830: Eva Todd returned call and patient was declined due to staffing issues  Eva Todd suggested that patient call intake for outpatient appointment, but the wait list is around 8-9 weeks  In addition, Eva Todd suggested giving patient crisis line number if she needed to talk  PES will give patient information and discharge home  MS Ivonne  0900: When PES informed patient that emergency appointment was declined, she became reluctant to be discharged  PES again suggested inpatient treatment, not so much for safety, but due to her severe depression; patient will take some time to think it over  Patient doesn't have phone, so she can't call her daughter, who wouldn't even know she was going to stay with her  PES will give patient time to think about it  MS vIonne  0940: PES spoke to patient, and she decided it would be best to go into the hospital for treatment  PES will begin bed search  Marti Garcia, [address]: PES called [facility]:  [place], [place]  1002: Received call from [facility]; beds available; faxed referral   Marti Garcia, [place]    1106: PES received call from Emma Vergara at [facility]:  Patient is accepted at [facility]  Patient is accepted by Dr Rufus Westfall per Emma Vergara   Nurse report is to be called to [phone],  to adult unit prior to patient transfer  [facility], [address]: PES called [facility]:  Transportation is arranged with [facility]  Transportation is scheduled for [place] [address] notified ED staff and patient  [address]: PES called insurance for precert: Insurance Authorization for admission:   Phone call placed to [employer] number: [phone]  Spoke to Rupesh Nugent  3 days approved  Level of care: inpatient  Review on 3/26/20 with [facility] @ [phone]     Authorization # [id number]    PES faxed precert and ETA to [place]; PES informed ED staff and patient    MS Ivonne        Insurance Authorization for Transportation:    NOT REQUIRED    MS Ivonne

## 2020-03-24 NOTE — ED NOTES
Patient reconsidered and has agreed to be placed inpatient for treatment       Mehran Mercer RN  03/24/20 6781

## 2020-03-24 NOTE — ED NOTES
Nurse to nurse report given to Nurse Minoo Lock RN at [address] at this time       Vianey Frank, BIGG  03/24/20 0006

## 2020-03-24 NOTE — ED NOTES
Patient's daughter called asking if patient was here and requesting more information  Pt gave verbal consent to give daughter info  Daughter requested to speak to patient, patient did not want any calls at this time  Patient continuing to cry  Continual observation maintained for patient safety       Kerrie Steen RN  03/23/20 0051

## 2020-03-24 NOTE — ED NOTES
Patient resting comfortably  Respirations easy and unlabored  Continual observation maintained for patient safety       Cynthia Duran RN  03/24/20 7153

## 2020-03-24 NOTE — ED NOTES
Patient resting comfortably  Respirations easy and unlabored  Continual observation maintained       Alverta Me, RN  03/24/20 9155

## 2020-03-24 NOTE — ED NOTES
Patient Belongings - Ramiro Vargas w/ Labels  2 Bags   Sneakers  Sweatpants  2 Shirts  Sweatshirt  Socks  Necklace and Ring (Urine Cup)     [employer]  03/23/20 2735

## 2020-03-24 NOTE — ED NOTES
Patient arrived via ambulance  Pt sts, "I just want to die  I don't want to live anymore "  Pt crying during triage  Pt sts she "has no plan but wants to die "  Pt calm and cooperative  Labs drawn and sent to lab  Will attempt to obtain urine sample         Cosmo Rdz RN  03/23/20 5562

## 2020-03-24 NOTE — ED NOTES
Pt's daughter called  Pt given portable phone to speak w/ daughter       Rosa Blanca RN  03/24/20 1707

## 2020-03-24 NOTE — ED NOTES
Received patient in [facility]  Patient on 1:1 observation for SI with CNA at [facility]       Chung Forman RN  03/24/20 0894

## 2020-03-24 NOTE — ED PROVIDER NOTES
History  Chief Complaint   Patient presents with    Suicidal     As per EMS, pt told family today she wanted to kill herself  Pt had recent loss of boyfriend  Pt sts, "I just want to die "     Patient presents for evaluation of SI  Patient states she doesn't want to live anymore  Just wants to be with him  Boyfriend recently passed away  Denies drug use  No plan  History provided by:  EMS personnel and patient   used: No    Suicidal   Presenting symptoms: suicidal thoughts        Prior to Admission Medications   Prescriptions Last Dose Informant Patient Reported? Taking? albuterol (PROVENTIL HFA,VENTOLIN HFA) 90 mcg/act inhaler   No No   Sig: Inhale 2 puffs every 6 (six) hours as needed for wheezing for up to 14 doses   Patient not taking: Reported on 2/13/2020   sertraline (ZOLOFT) 100 mg tablet 3/22/2020 at Unknown time  Yes Yes   Sig: Take 100 mg by mouth daily      Facility-Administered Medications: None       Past Medical History:   Diagnosis Date    Anxiety     COPD (chronic obstructive pulmonary disease) ([facility] [address] )     Depression        Past Surgical History:   Procedure Laterality Date    CHOLECYSTECTOMY         History reviewed  No pertinent family history  I have reviewed and agree with the history as documented  E-Cigarette/Vaping    E-Cigarette Use Never User      E-Cigarette/Vaping Substances     Social History     Tobacco Use    Smoking status: Current Every Day Smoker     Packs/day: 0 50     Types: Cigarettes    Smokeless tobacco: Never Used   Substance Use Topics    Alcohol use: Yes     Comment: occas    Drug use: No       Review of Systems   Constitutional: Negative for fever  Respiratory: Negative for cough  Psychiatric/Behavioral: Positive for suicidal ideas  All other systems reviewed and are negative  Physical Exam  Physical Exam   Constitutional: She is oriented to person, place, and time  No distress     HENT:   Mouth/Throat: Oropharynx is clear and moist    Eyes: Pupils are equal, round, and reactive to light  Neck: Normal range of motion  Cardiovascular: Normal rate, regular rhythm and intact distal pulses  Pulmonary/Chest: Effort normal and breath sounds normal  No respiratory distress  Abdominal: Soft  There is no tenderness  Musculoskeletal: Normal range of motion  Neurological: She is alert and oriented to person, place, and time  Skin: Capillary refill takes less than 2 seconds  Nursing note and vitals reviewed  Vital Signs  ED Triage Vitals [03/23/20 2257]   Temperature Pulse Respirations Blood Pressure SpO2   97 7 °F (36 5 °C) 83 20 127/64 96 %      Temp Source Heart Rate Source Patient Position - Orthostatic VS BP Location FiO2 (%)   Oral Monitor Sitting Right arm --      Pain Score       --           Vitals:    03/23/20 2257   BP: 127/64   Pulse: 83   Patient Position - Orthostatic VS: Sitting         Visual Acuity      ED Medications  Medications - No data to display    Diagnostic Studies  Results Reviewed     Procedure Component Value Units Date/Time    Rapid drug screen, urine [[id number]]  (Abnormal) Collected:  03/23/20 2313    Lab Status:  Final result Specimen:  Urine, Clean Catch Updated:  03/23/20 2335     Amph/Meth UR Positive     Barbiturate Ur Negative     Benzodiazepine Urine Negative     Cocaine Urine Negative     Methadone Urine Negative     Opiate Urine Negative     PCP Ur Negative     THC Urine Negative    Narrative:       FOR MEDICAL PURPOSES ONLY  IF CONFIRMATION NEEDED PLEASE CONTACT THE LAB WITHIN 5 DAYS      Drug Screen Cutoff Levels:  AMPHETAMINE/METHAMPHETAMINES  1000 ng/mL  BARBITURATES     200 ng/mL  BENZODIAZEPINES     200 ng/mL  COCAINE      300 ng/mL  METHADONE      300 ng/mL  OPIATES      300 ng/mL  PHENCYCLIDINE     25 ng/mL  THC       50 ng/mL      Ethanol [[id number]]  (Abnormal) Collected:  03/23/20 2307    Lab Status:  Final result Specimen:  Blood from Arm, Left Updated:  03/23/20 2333 Ethanol Lvl 304 mg/dL     Comprehensive metabolic panel [[id number]]  (Abnormal) Collected:  03/23/20 2307    Lab Status:  Final result Specimen:  Blood from Arm, Left Updated:  03/23/20 2329     Sodium 137 mmol/L      Potassium 3 7 mmol/L      Chloride 101 mmol/L      CO2 23 mmol/L      ANION GAP 13 mmol/L      BUN 10 mg/dL      Creatinine 0 56 mg/dL      Glucose 111 mg/dL      Calcium 8 6 mg/dL      AST 20 U/L      ALT 26 U/L      Alkaline Phosphatase 72 U/L      Total Protein 7 3 g/dL      Albumin 3 9 g/dL      Total Bilirubin 0 30 mg/dL      eGFR 105 ml/min/1 73sq m     Narrative:       [place] guidelines for Chronic Kidney Disease (CKD):     Stage 1 with normal or high GFR (GFR > 90 mL/min/1 73 square meters)    Stage 2 Mild CKD (GFR = 60-89 mL/min/1 73 square meters)    Stage 3A Moderate CKD (GFR = 45-59 mL/min/1 73 square meters)    Stage 3B Moderate CKD (GFR = 30-44 mL/min/1 73 square meters)    Stage 4 Severe CKD (GFR = 15-29 mL/min/1 73 square meters)    Stage 5 End Stage CKD (GFR <15 mL/min/1 73 square meters)  Note: GFR calculation is accurate only with a steady state creatinine    Lipase [[id number]]  (Normal) Collected:  03/23/20 2307    Lab Status:  Final result Specimen:  Blood from Arm, Left Updated:  03/23/20 2329     Lipase 209 u/L     Urine Microscopic [[id number]]  (Abnormal) Collected:  03/23/20 2313    Lab Status:  Final result Specimen:  Urine, Clean Catch Updated:  03/23/20 2326     RBC, UA 0-1 /hpf      WBC, UA 0-1 /hpf      Epithelial Cells Occasional /hpf      Bacteria, UA Occasional /hpf     UA (URINE) with reflex to Scope [[id number]]  (Abnormal) Collected:  03/23/20 2313    Lab Status:  Final result Specimen:  Urine, Clean Catch Updated:  03/23/20 2321     Color, UA Yellow     Clarity, UA Clear     Specific Gravity, UA <=1 005     pH, UA 5 5     Leukocytes, UA Negative     Nitrite, UA Negative     Protein, UA Negative mg/dl      Glucose, UA Negative mg/dl      Ketones, UA Negative mg/dl      Urobilinogen, UA 0 2 E U /dl      Bilirubin, UA Negative     Blood, UA Small    POCT pregnancy, urine [[id number]]  (Normal) Resulted:  03/23/20 2318    Lab Status:  Final result Updated:  03/23/20 2319     EXT PREG TEST UR (Ref: Negative) Negative     Control Valid    CBC and differential [[id number]]  (Abnormal) Collected:  03/23/20 2307    Lab Status:  Final result Specimen:  Blood from Arm, Left Updated:  03/23/20 2312     WBC 10 60 Thousand/uL      RBC 4 53 Million/uL      Hemoglobin 15 3 g/dL      Hematocrit 46 0 %       fL      MCH 33 8 pg      MCHC 33 3 g/dL      RDW 12 2 %      MPV 8 8 fL      Platelets 586 Thousands/uL      nRBC 0 /100 WBCs      Neutrophils Relative 44 %      Immat GRANS % 0 %      Lymphocytes Relative 43 %      Monocytes Relative 10 %      Eosinophils Relative 2 %      Basophils Relative 1 %      Neutrophils Absolute 4 70 Thousands/µL      Immature Grans Absolute 0 03 Thousand/uL      Lymphocytes Absolute 4 54 Thousands/µL      Monocytes Absolute 1 06 Thousand/µL      Eosinophils Absolute 0 20 Thousand/µL      Basophils Absolute 0 07 Thousands/µL                  No orders to display              Procedures  Procedures         ED Course                                 MDM  Number of Diagnoses or Management Options  Diagnosis management comments: Pulse ox 96% on RA indicating adequate oxygenation      Patient medically cleared for mental health evaluation and inpatient treatment as needed  Signed out to next provider Dr Holli Rubalcava         Amount and/or Complexity of Data Reviewed  Clinical lab tests: ordered and reviewed  Decide to obtain previous medical records or to obtain history from someone other than the patient: yes  Obtain history from someone other than the patient: yes  Review and summarize past medical records: yes    Patient Progress  Patient progress: stable        Disposition  Final diagnoses:   None     ED Disposition     None Follow-up Information    None         Patient's Medications   Discharge Prescriptions    No medications on file     No discharge procedures on file      PDMP Review     None          ED Provider  Electronically Signed by           Deirdre Hawkins DO  03/24/20 4591

## 2020-03-24 NOTE — ED NOTES
Patient awake in bed  Offers no complaints at this time  1:1 remains for patient safety       Debbie Cope RN  03/24/20 5402

## 2020-03-24 NOTE — ED NOTES
Patient denies SI  No distress noted at this time  No complaints of pain  Vital signs within normal range  Patient is pleasant and cooperative with staff       Wilfrido Brennan RN  03/24/20 5127

## 2020-03-24 NOTE — ED NOTES
Patient sleeping comfortably  No distress noted  1:1 remains       Rah Jean-Baptiste RN  03/24/20 2052

## 2020-03-24 NOTE — ED NOTES
Patient resting comfortably  Respirations easy and unlabored  1:1 remains       Kaitlynn Grajeda, RN  03/24/20 1431

## 2020-03-24 NOTE — ED CARE HANDOFF
Emergency Department Sign Out Note            patient signed out to me pending crisis evaluation  Crisis evaluated, patient safe for discharge will follow up outpatient  Procedures  MDM    Disposition  Final diagnoses:   Depression with suicidal ideation     Time reflects when diagnosis was documented in both MDM as applicable and the Disposition within this note     Time User Action Codes Description Comment    3/24/2020  8:50 AM Dallin Nair Add [F32 9,  R49 [id number]] Depression with suicidal ideation       ED Disposition     ED Disposition Condition Date/Time Comment    Discharge Stable Tue Mar 24, 2020  8:50 AM  Other discharge to home/self care  Follow-up Information     Follow up With Specialties Details Why Contact Info Additional Information    [facility] Emergency Department Emergency Medicine  If symptoms worsen [facility]  [phone] [facility], [place], [place], [address]        Patient's Medications   Discharge Prescriptions    No medications on file     No discharge procedures on file         ED Provider  Electronically Signed by     Quintin Fuchs DO  03/24/20 0089

## 2020-03-24 NOTE — ED NOTES
3/24/20 @ 15: 62year-old SWF, self presented to ED due to suicidal thoughts saying, "I hate my life; I want to die; My boyfriend  and it was the 11 month anniversary; I have nothing left to live for " Patient says she's been struggling since her boyfriend  in September from a sudden heart attack  Patient has been living with her son, but that "hasn't been going well, so I'm going to stay with my daughter "  In addition, patient's BAL was 56, saying, "I drink everday, but yesterday was the 6 month anniversary of my boyfriend's death and I was very depressed "  Patient continues to report being depressed  PES offered inpatient treatment to address her grief and increased depressive symptomology  Please see copy of crisis assessment for further details    Katherine Burk MS

## 2020-03-24 NOTE — ED NOTES
Patient requesting to speak with her daughter,no contact numbers in chart  Patient informed no numbers are listed in her chart  Patient has no phone at this time and does not know her daughters' number,will attempt to fins a way before she is transported to [facility] [address] to get in touch with daughter       Tonny BIGG Ramos  03/24/20 8000

## 2020-03-24 NOTE — ED NOTES
Patient awake in bed, calm and cooperative  No distress noted  Continual observation maintained       Sb Harris, RN  03/24/20 5618

## 2020-03-24 NOTE — ED NOTES
Crisis Worker in to speak with patient again,encouraging patient to think about going to inpatient psych for treatment for her depression and her addiction issues  Patient agrees to think about going inpatient  ER MD made aware       Abdoulaye Mayer RN  03/24/20 9717

## 2020-03-24 NOTE — EMTALA/ACUTE CARE TRANSFER
[address] [address]  Dept: [phone]      EMTALA TRANSFER CONSENT    NAME Chris Amezquita                                         1965                              MRN [id number]    I have been informed of my rights regarding examination, treatment, and transfer   by Dr Davis Daniels DO    Benefits: Specialized equipment and/or services available at the receiving facility (Include comment)________________________    Risks: Potential for delay in receiving treatment      Consent for Transfer:  I acknowledge that my medical condition has been evaluated and explained to me by the emergency department physician or other qualified medical person and/or my attending physician, who has recommended that I be transferred to the service of  Accepting Physician: Dr London Bearden at [address] [place] [address] Name, [address] : [facility]  The above potential benefits of such transfer, the potential risks associated with such transfer, and the probable risks of not being transferred have been explained to me, and I fully understand them  The doctor has explained that, in my case, the benefits of transfer outweigh the risks  I agree to be transferred  I authorize the performance of emergency medical procedures and treatments upon me in both transit and upon arrival at the receiving facility  Additionally, I authorize the release of any and all medical records to the receiving facility and request they be transported with me, if possible  I understand that the safest mode of transportation during a medical emergency is an ambulance and that the Hospital advocates the use of this mode of transport  Risks of traveling to the receiving facility by car, including absence of medical control, life sustaining equipment, such as oxygen, and medical personnel has been explained to me and I fully understand them      (TANIA CORRECT BOX BELOW)  [  ]  I consent to the stated transfer and to be transported by ambulance/helicopter  [  ]  I consent to the stated transfer, but refuse transportation by ambulance and accept full responsibility for my transportation by car  I understand the risks of non-ambulance transfers and I exonerate the Hospital and its staff from any deterioration in my condition that results from this refusal     X___________________________________________    DATE  20  TIME________  Signature of patient or legally responsible individual signing on patient behalf           RELATIONSHIP TO PATIENT_________________________          Provider Certification    NAME Anne-Marie Holloway                                         1965                              MRN [id number]    A medical screening exam was performed on the above named patient  Based on the examination:    Condition Necessitating Transfer The encounter diagnosis was Depression with suicidal ideation  Patient Condition: The patient has been stabilized such that within reasonable medical probability, no material deterioration of the patient condition or the condition of the unborn child(sanjay) is likely to result from the transfer    Reason for Transfer: Level of Care needed not available at this facility    Transfer Requirements: Facility [facility]   · Space available and qualified personnel available for treatment as acknowledged by    · Agreed to accept transfer and to provide appropriate medical treatment as acknowledged by       Dr Alis Herrmann  · Appropriate medical records of the examination and treatment of the patient are provided at the time of transfer   [address], [address] _______  · Transfer will be performed by qualified personnel from    and appropriate transfer equipment as required, including the use of necessary and appropriate life support measures      Provider Certification: I have examined the patient and explained the following risks and benefits of being transferred/refusing transfer to the patient/family:  General risk, such as traffic hazards, adverse weather conditions, rough terrain or turbulence, possible failure of equipment (including vehicle or aircraft), or consequences of actions of persons outside the control of the transport personnel      Based on these reasonable risks and benefits to the patient and/or the unborn child(sanjay), and based upon the information available at the time of the patients examination, I certify that the medical benefits reasonably to be expected from the provision of appropriate medical treatments at another medical facility outweigh the increasing risks, if any, to the individuals medical condition, and in the case of labor to the unborn child, from effecting the transfer      X____________________________________________ DATE 03/24/20        TIME_______      ORIGINAL - SEND TO MEDICAL RECORDS   COPY - SEND WITH PATIENT DURING TRANSFER

## 2020-10-04 ENCOUNTER — HOSPITAL ENCOUNTER (EMERGENCY)
Facility: HOSPITAL | Age: 55
End: 2020-10-05
Attending: EMERGENCY MEDICINE | Admitting: EMERGENCY MEDICINE
Payer: COMMERCIAL

## 2020-10-04 ENCOUNTER — APPOINTMENT (EMERGENCY)
Dept: RADIOLOGY | Facility: HOSPITAL | Age: 55
End: 2020-10-04
Payer: COMMERCIAL

## 2020-10-04 DIAGNOSIS — F32.9 MAJOR DEPRESSION: ICD-10-CM

## 2020-10-04 DIAGNOSIS — F32.A DEPRESSION: Primary | ICD-10-CM

## 2020-10-04 DIAGNOSIS — F41.9 ANXIETY: ICD-10-CM

## 2020-10-04 LAB
ALBUMIN SERPL BCP-MCNC: 3.6 G/DL (ref 3.5–5)
ALP SERPL-CCNC: 72 U/L (ref 46–116)
ALT SERPL W P-5'-P-CCNC: 22 U/L (ref 12–78)
AMPHETAMINES SERPL QL SCN: NEGATIVE
ANION GAP SERPL CALCULATED.3IONS-SCNC: 9 MMOL/L (ref 4–13)
AST SERPL W P-5'-P-CCNC: 16 U/L (ref 5–45)
BACTERIA UR QL AUTO: NORMAL /HPF
BARBITURATES UR QL: NEGATIVE
BASOPHILS # BLD AUTO: 0.05 THOUSANDS/ΜL (ref 0–0.1)
BASOPHILS NFR BLD AUTO: 1 % (ref 0–1)
BENZODIAZ UR QL: NEGATIVE
BILIRUB SERPL-MCNC: 0.4 MG/DL (ref 0.2–1)
BILIRUB UR QL STRIP: NEGATIVE
BUN SERPL-MCNC: 8 MG/DL (ref 5–25)
CALCIUM SERPL-MCNC: 8.8 MG/DL (ref 8.3–10.1)
CHLORIDE SERPL-SCNC: 105 MMOL/L (ref 100–108)
CLARITY UR: CLEAR
CO2 SERPL-SCNC: 30 MMOL/L (ref 21–32)
COCAINE UR QL: NEGATIVE
COLOR UR: ABNORMAL
CREAT SERPL-MCNC: 0.67 MG/DL (ref 0.6–1.3)
EOSINOPHIL # BLD AUTO: 0.1 THOUSAND/ΜL (ref 0–0.61)
EOSINOPHIL NFR BLD AUTO: 1 % (ref 0–6)
ERYTHROCYTE [DISTWIDTH] IN BLOOD BY AUTOMATED COUNT: 13.2 % (ref 11.6–15.1)
ETHANOL SERPL-MCNC: 159 MG/DL (ref 0–3)
EXT PREG TEST URINE: NEGATIVE
EXT. CONTROL ED NAV: NORMAL
GFR SERPL CREATININE-BSD FRML MDRD: 99 ML/MIN/1.73SQ M
GLUCOSE SERPL-MCNC: 90 MG/DL (ref 65–140)
GLUCOSE UR STRIP-MCNC: NEGATIVE MG/DL
HCT VFR BLD AUTO: 41 % (ref 34.8–46.1)
HGB BLD-MCNC: 13.4 G/DL (ref 11.5–15.4)
HGB UR QL STRIP.AUTO: ABNORMAL
IMM GRANULOCYTES # BLD AUTO: 0.03 THOUSAND/UL (ref 0–0.2)
IMM GRANULOCYTES NFR BLD AUTO: 0 % (ref 0–2)
KETONES UR STRIP-MCNC: NEGATIVE MG/DL
LEUKOCYTE ESTERASE UR QL STRIP: NEGATIVE
LYMPHOCYTES # BLD AUTO: 2.48 THOUSANDS/ΜL (ref 0.6–4.47)
LYMPHOCYTES NFR BLD AUTO: 35 % (ref 14–44)
MAGNESIUM SERPL-MCNC: 2.1 MG/DL (ref 1.6–2.6)
MCH RBC QN AUTO: 32.5 PG (ref 26.8–34.3)
MCHC RBC AUTO-ENTMCNC: 32.7 G/DL (ref 31.4–37.4)
MCV RBC AUTO: 100 FL (ref 82–98)
METHADONE UR QL: NEGATIVE
MONOCYTES # BLD AUTO: 0.51 THOUSAND/ΜL (ref 0.17–1.22)
MONOCYTES NFR BLD AUTO: 7 % (ref 4–12)
NEUTROPHILS # BLD AUTO: 3.94 THOUSANDS/ΜL (ref 1.85–7.62)
NEUTS SEG NFR BLD AUTO: 56 % (ref 43–75)
NITRITE UR QL STRIP: NEGATIVE
NON-SQ EPI CELLS URNS QL MICRO: NORMAL /HPF
NRBC BLD AUTO-RTO: 0 /100 WBCS
OPIATES UR QL SCN: NEGATIVE
OXYCODONE+OXYMORPHONE UR QL SCN: NEGATIVE
PCP UR QL: NEGATIVE
PH UR STRIP.AUTO: 6 [PH]
PLATELET # BLD AUTO: 289 THOUSANDS/UL (ref 149–390)
PMV BLD AUTO: 9.6 FL (ref 8.9–12.7)
POTASSIUM SERPL-SCNC: 3.5 MMOL/L (ref 3.5–5.3)
PROT SERPL-MCNC: 7.1 G/DL (ref 6.4–8.2)
PROT UR STRIP-MCNC: NEGATIVE MG/DL
RBC # BLD AUTO: 4.12 MILLION/UL (ref 3.81–5.12)
RBC #/AREA URNS AUTO: NORMAL /HPF
SARS-COV-2 RNA RESP QL NAA+PROBE: NEGATIVE
SODIUM SERPL-SCNC: 144 MMOL/L (ref 136–145)
SP GR UR STRIP.AUTO: <=1.005 (ref 1–1.03)
THC UR QL: NEGATIVE
UROBILINOGEN UR QL STRIP.AUTO: 0.2 E.U./DL
WBC # BLD AUTO: 7.11 THOUSAND/UL (ref 4.31–10.16)
WBC #/AREA URNS AUTO: NORMAL /HPF

## 2020-10-04 PROCEDURE — 83735 ASSAY OF MAGNESIUM: CPT | Performed by: EMERGENCY MEDICINE

## 2020-10-04 PROCEDURE — 80320 DRUG SCREEN QUANTALCOHOLS: CPT | Performed by: EMERGENCY MEDICINE

## 2020-10-04 PROCEDURE — 80307 DRUG TEST PRSMV CHEM ANLYZR: CPT | Performed by: EMERGENCY MEDICINE

## 2020-10-04 PROCEDURE — 80053 COMPREHEN METABOLIC PANEL: CPT | Performed by: EMERGENCY MEDICINE

## 2020-10-04 PROCEDURE — 36415 COLL VENOUS BLD VENIPUNCTURE: CPT | Performed by: EMERGENCY MEDICINE

## 2020-10-04 PROCEDURE — 87635 SARS-COV-2 COVID-19 AMP PRB: CPT | Performed by: EMERGENCY MEDICINE

## 2020-10-04 PROCEDURE — 85025 COMPLETE CBC W/AUTO DIFF WBC: CPT | Performed by: EMERGENCY MEDICINE

## 2020-10-04 PROCEDURE — 99285 EMERGENCY DEPT VISIT HI MDM: CPT | Performed by: EMERGENCY MEDICINE

## 2020-10-04 PROCEDURE — 81001 URINALYSIS AUTO W/SCOPE: CPT | Performed by: EMERGENCY MEDICINE

## 2020-10-04 PROCEDURE — 71045 X-RAY EXAM CHEST 1 VIEW: CPT

## 2020-10-04 PROCEDURE — 81025 URINE PREGNANCY TEST: CPT | Performed by: EMERGENCY MEDICINE

## 2020-10-04 PROCEDURE — 93005 ELECTROCARDIOGRAM TRACING: CPT

## 2020-10-04 PROCEDURE — 99285 EMERGENCY DEPT VISIT HI MDM: CPT

## 2020-10-04 RX ORDER — MIRTAZAPINE 15 MG/1
15 TABLET, FILM COATED ORAL
COMMUNITY
End: 2020-11-17 | Stop reason: ALTCHOICE

## 2020-10-05 VITALS
SYSTOLIC BLOOD PRESSURE: 119 MMHG | OXYGEN SATURATION: 98 % | DIASTOLIC BLOOD PRESSURE: 59 MMHG | RESPIRATION RATE: 16 BRPM | TEMPERATURE: 98 F | BODY MASS INDEX: 23.78 KG/M2 | HEART RATE: 79 BPM | WEIGHT: 130 LBS

## 2020-10-06 LAB
ATRIAL RATE: 67 BPM
P AXIS: 65 DEGREES
PR INTERVAL: 158 MS
QRS AXIS: 24 DEGREES
QRSD INTERVAL: 76 MS
QT INTERVAL: 442 MS
QTC INTERVAL: 467 MS
T WAVE AXIS: 65 DEGREES
VENTRICULAR RATE: 67 BPM

## 2020-10-06 PROCEDURE — 93010 ELECTROCARDIOGRAM REPORT: CPT | Performed by: INTERNAL MEDICINE

## 2020-11-17 ENCOUNTER — OFFICE VISIT (OUTPATIENT)
Dept: FAMILY MEDICINE CLINIC | Facility: CLINIC | Age: 55
End: 2020-11-17
Payer: COMMERCIAL

## 2020-11-17 VITALS
TEMPERATURE: 97.2 F | SYSTOLIC BLOOD PRESSURE: 122 MMHG | RESPIRATION RATE: 16 BRPM | BODY MASS INDEX: 24.29 KG/M2 | OXYGEN SATURATION: 98 % | HEART RATE: 71 BPM | WEIGHT: 132 LBS | HEIGHT: 62 IN | DIASTOLIC BLOOD PRESSURE: 60 MMHG

## 2020-11-17 DIAGNOSIS — Z12.31 ENCOUNTER FOR SCREENING MAMMOGRAM FOR MALIGNANT NEOPLASM OF BREAST: Primary | ICD-10-CM

## 2020-11-17 DIAGNOSIS — J44.9 CHRONIC OBSTRUCTIVE PULMONARY DISEASE, UNSPECIFIED COPD TYPE (HCC): ICD-10-CM

## 2020-11-17 PROBLEM — F32.9 MDD (MAJOR DEPRESSIVE DISORDER): Status: ACTIVE | Noted: 2020-11-17

## 2020-11-17 PROCEDURE — 99386 PREV VISIT NEW AGE 40-64: CPT | Performed by: FAMILY MEDICINE

## 2020-11-17 PROCEDURE — 3008F BODY MASS INDEX DOCD: CPT | Performed by: FAMILY MEDICINE

## 2020-11-17 RX ORDER — QUETIAPINE FUMARATE 50 MG/1
50 TABLET, FILM COATED ORAL
COMMUNITY
Start: 2020-11-06 | End: 2021-05-28 | Stop reason: DRUGHIGH

## 2020-11-17 RX ORDER — FLUOXETINE HYDROCHLORIDE 40 MG/1
20 CAPSULE ORAL 3 TIMES DAILY
COMMUNITY
Start: 2020-11-06 | End: 2021-05-28 | Stop reason: DRUGHIGH

## 2020-11-17 RX ORDER — FLUTICASONE PROPIONATE 110 UG/1
1 AEROSOL, METERED RESPIRATORY (INHALATION) 2 TIMES DAILY
Qty: 1 INHALER | Refills: 2 | Status: SHIPPED | OUTPATIENT
Start: 2020-11-17 | End: 2021-05-28 | Stop reason: ALTCHOICE

## 2020-11-17 RX ORDER — ROPINIROLE 0.5 MG/1
0.5 TABLET, FILM COATED ORAL
COMMUNITY
Start: 2020-11-06

## 2021-03-02 ENCOUNTER — HOSPITAL ENCOUNTER (OUTPATIENT)
Dept: RADIOLOGY | Facility: HOSPITAL | Age: 56
Discharge: HOME | End: 2021-03-02
Payer: COMMERCIAL

## 2021-03-02 VITALS — WEIGHT: 135 LBS | HEIGHT: 62 IN | BODY MASS INDEX: 24.84 KG/M2

## 2021-03-02 DIAGNOSIS — Z12.31 ENCOUNTER FOR SCREENING MAMMOGRAM FOR MALIGNANT NEOPLASM OF BREAST: ICD-10-CM

## 2021-03-02 PROCEDURE — 77063 BREAST TOMOSYNTHESIS BI: CPT

## 2021-03-02 PROCEDURE — 77067 SCR MAMMO BI INCL CAD: CPT

## 2021-05-24 ENCOUNTER — RA CDI HCC (OUTPATIENT)
Dept: OTHER | Facility: HOSPITAL | Age: 56
End: 2021-05-24

## 2021-05-24 NOTE — PROGRESS NOTES
[place] [address]  coding opportunities             Chart reviewed, (number of) suggestions sent to provider: 1     Problem listed updated  Provider Accepted, (number of) suggestions accepted: 1     Provider Rejected Suggestions for: suggestion used     Patients insurance company: [employer] (Medicare and Commercial for [employer] and [facility])     Visit status: Patient arrived for their scheduled appointment        [place] [address]  coding opportunities             Chart reviewed, (number of) suggestions sent to provider: 1     Problem listed updated   Provider Accepted, (number of) suggestions accepted: 1        Patients insurance company: [employer] (Medicare and [employer] for [employer] and [facility])           [place] [address]  coding opportunities             Chart reviewed, (number of) suggestions sent to provider: 1  J44 9  Chronic obstructive pulmonary disease, unspecified         Patients insurance company: [employer] P[employer] (Medicare and [employer] for [employer] and [facility])

## 2021-05-28 ENCOUNTER — OFFICE VISIT (OUTPATIENT)
Dept: FAMILY MEDICINE CLINIC | Facility: CLINIC | Age: 56
End: 2021-05-28
Payer: COMMERCIAL

## 2021-05-28 VITALS
BODY MASS INDEX: 28.51 KG/M2 | HEART RATE: 80 BPM | RESPIRATION RATE: 18 BRPM | OXYGEN SATURATION: 98 % | HEIGHT: 61 IN | SYSTOLIC BLOOD PRESSURE: 138 MMHG | TEMPERATURE: 97 F | DIASTOLIC BLOOD PRESSURE: 70 MMHG | WEIGHT: 151 LBS

## 2021-05-28 DIAGNOSIS — F32.9 MAJOR DEPRESSIVE DISORDER, REMISSION STATUS UNSPECIFIED, UNSPECIFIED WHETHER RECURRENT: ICD-10-CM

## 2021-05-28 DIAGNOSIS — J44.9 CHRONIC OBSTRUCTIVE PULMONARY DISEASE, UNSPECIFIED COPD TYPE (HCC): Primary | ICD-10-CM

## 2021-05-28 DIAGNOSIS — E55.9 VITAMIN D DEFICIENCY: ICD-10-CM

## 2021-05-28 DIAGNOSIS — J45.909 MILD REACTIVE AIRWAYS DISEASE, UNSPECIFIED WHETHER PERSISTENT: ICD-10-CM

## 2021-05-28 DIAGNOSIS — R53.83 FATIGUE, UNSPECIFIED TYPE: ICD-10-CM

## 2021-05-28 DIAGNOSIS — F41.9 ANXIETY: ICD-10-CM

## 2021-05-28 DIAGNOSIS — G25.81 RESTLESS LEG SYNDROME: ICD-10-CM

## 2021-05-28 DIAGNOSIS — Z13.1 SCREENING FOR DIABETES MELLITUS (DM): ICD-10-CM

## 2021-05-28 DIAGNOSIS — Z76.89 ENCOUNTER TO ESTABLISH CARE: ICD-10-CM

## 2021-05-28 DIAGNOSIS — Z11.4 SCREENING FOR HIV (HUMAN IMMUNODEFICIENCY VIRUS): ICD-10-CM

## 2021-05-28 DIAGNOSIS — F10.10 ETOH ABUSE: ICD-10-CM

## 2021-05-28 DIAGNOSIS — R63.5 WEIGHT GAIN: ICD-10-CM

## 2021-05-28 DIAGNOSIS — G47.00 INSOMNIA, UNSPECIFIED TYPE: ICD-10-CM

## 2021-05-28 DIAGNOSIS — Z13.220 SCREENING FOR LIPID DISORDERS: ICD-10-CM

## 2021-05-28 PROCEDURE — 36415 COLL VENOUS BLD VENIPUNCTURE: CPT | Performed by: NURSE PRACTITIONER

## 2021-05-28 PROCEDURE — 3725F SCREEN DEPRESSION PERFORMED: CPT | Performed by: NURSE PRACTITIONER

## 2021-05-28 PROCEDURE — 99214 OFFICE O/P EST MOD 30 MIN: CPT | Performed by: NURSE PRACTITIONER

## 2021-05-28 RX ORDER — FLUOXETINE HYDROCHLORIDE 20 MG/1
20 CAPSULE ORAL 3 TIMES DAILY
COMMUNITY

## 2021-05-28 RX ORDER — ALBUTEROL SULFATE 90 UG/1
2 AEROSOL, METERED RESPIRATORY (INHALATION) EVERY 6 HOURS PRN
Qty: 6.7 G | Refills: 0 | Status: SHIPPED | OUTPATIENT
Start: 2021-05-28

## 2021-05-28 RX ORDER — ALBUTEROL SULFATE 90 UG/1
2 AEROSOL, METERED RESPIRATORY (INHALATION) EVERY 6 HOURS PRN
Qty: 6.7 G | Refills: 0 | Status: SHIPPED | OUTPATIENT
Start: 2021-05-28 | End: 2021-05-28 | Stop reason: SDUPTHER

## 2021-05-28 RX ORDER — MIRTAZAPINE 15 MG/1
15 TABLET, FILM COATED ORAL DAILY
COMMUNITY

## 2021-05-28 RX ORDER — QUETIAPINE FUMARATE 100 MG/1
200 TABLET, FILM COATED ORAL
COMMUNITY

## 2021-05-28 NOTE — PATIENT INSTRUCTIONS
Contact your insurance company to see if they can refer you to an alcohol addiction specialist as discussed  Heart healthy, carbohydrate controlled diet- limit red meat, limit saturated fat, moderate salt intake, limit junk food, etc    Regular exercise  Stress management  Activities to divert attention when possible  Conscious breathing techniques as discussed  Coping mechanisms and strategies vary from person to person so try to utilize strategies that you think may work for you (such as meditation, music, etc  )  Continue counseling as discussed  It is very important that you discuss your alcohol intake with your psychiatrist    Anti anxiety/depression medications as prescribed  Routine labwork and screenings as ordered  Alcohol Use Disorder   WHAT YOU NEED TO KNOW:   What is alcohol use disorder (AUD)? AUD is problem drinking  AUD includes alcohol abuse and alcohol dependence  Alcohol can damage your brain, heart, kidneys, lungs, and liver  Your risk of stroke is greater if you have 5 or more drinks each day  If you are pregnant, you and your baby are at risk for serious health problems  No amount of alcohol is safe during pregnancy  What are the symptoms of AUD? Your healthcare provider will ask you about your alcohol use  Mild AUD means you had 2 to 3 of the following over the last 12 months  Moderate means you have 4 to 5  Severe means you have 6 or more  · You have tried to decrease or stop drinking more than one time  You are not able to control your drinking habits  You keep going back to drinking even after you quit  You have had more to drink than you planned, or you drank for a longer time than you wanted  · You crave alcohol  You have a desire to drink more often or to drink larger amounts of alcohol  You have a hard time thinking about anything other than alcohol  · You put extra effort and time into drinking alcohol    You often go to events or activities that will include drinking  You spend much of your time drinking alcohol or being with people who also drink  · You spend less time doing more important things  You have trouble taking part in social or daily activities at school, work, or home  · You have given up activities that you like  You would rather drink  You also spend a lot of time recovering from the effects of drinking  · You continue to drink even when it causes health or relationship problems  You may have problems with your family, friends, or coworkers but still want to drink  Health problems include liver problems, stomach ulcers, high blood pressure, or a stroke  · You develop alcohol tolerance  Tolerance means the amount of alcohol you usually drink no longer causes the effects you desire  You need to drink even more alcohol to get the same effects  · You have withdrawal (physical or mental) symptoms after not drinking for a short period  Alcohol is needed to relieve or prevent withdrawal symptoms such as tremors (shakes)  You may also have to drink to stop withdrawal symptoms or to cure a hangover  · You put yourself in physically dangerous settings while drinking  Examples include driving a car or having unprotected sex after drinking  How is AUD treated? Your healthcare provider may admit you to the hospital to help you withdraw from alcohol safely  Then you may need any of the following:  · Medicines to decrease your craving for alcohol    · Support groups such as Alcoholics Anonymous     · Therapy services from a psychiatrist or psychologist     · Admission to an inpatient facility for treatment for severe dependence    Where can I get more information about AUD?    · Substance Abuse and [address] [address]  Web Address: https://[employer]Gate/    · Alcoholics Anonymous  Web Address: http://[employer]/  Call your local emergency number (911 in the [address] [place] [address],[address]) if:   · You have seizures  When should I call my doctor? · Your heart is beating faster than usual     · You have hallucinations  · You cannot remember what happens while you are drinking  · You are anxious and have nausea  · Your hands are shaky and you are sweating heavily  · You have questions or concerns about your condition or care  CARE AGREEMENT:   You have the right to help plan your care  Learn about your health condition and how it may be treated  Discuss treatment options with your healthcare providers to decide what care you want to receive  You always have the right to refuse treatment  The above information is an  only  It is not intended as medical advice for individual conditions or treatments  Talk to your doctor, nurse or pharmacist before following any medical regimen to see if it is safe and effective for you  © Copyright [address] Information is for End User's use only and may not be sold, redistributed or otherwise used for commercial purposes   All illustrations and images included in CareNotes® are the copyrighted property of A D A M , Inc  or [address] [place] [address]

## 2021-05-28 NOTE — PROGRESS NOTES
Assessment/Plan:  1  Chronic obstructive pulmonary disease, unspecified COPD type ([address] )  Stable  Monitor  Rescue inhaler 2 puffs every 4-6 hours as needed for shortness breath, chest tightness, bronchospasm, coughing fits  - CBC and differential  - albuterol (PROVENTIL HFA,VENTOLIN HFA) 90 mcg/act inhaler; Inhale 2 puffs every 6 (six) hours as needed for wheezing for up to 14 doses  Dispense: 6 7 g; Refill: 0  2  Major depressive disorder, remission status unspecified, unspecified whether recurrent  Managed by psych  No suicidal ideation  Continue current meds  Contracted for safety  Anticipatory guidance  - CBC and differential  - Comprehensive metabolic panel  3  Mild reactive airways disease, unspecified whether persistent  Stable  - CBC and differential  - albuterol (PROVENTIL HFA,VENTOLIN HFA) 90 mcg/act inhaler; Inhale 2 puffs every 6 (six) hours as needed for wheezing for up to 14 doses  Dispense: 6 7 g; Refill: 0  4  Insomnia, unspecified type  Sleep hygiene as reviewed  Stop watching television and/or use of computer at least one hour prior to bedtime  Establish set bedtime  Keep bedroom cool, quiet, and dark  There should be no stimulation in your bedroom  Turn off electronics or put into sleep mode  Can try herbal teas (ie: chamomile or sleepytime tea) one hour prior to bedtime  Consider use of white noise machine if you are easily awakened by noise  Consider use of room darkening blinds/shades to minimize light in room  Can try over the counter melatonin  Limit caffeine and alcohol intake  - CBC and differential  - Comprehensive metabolic panel  5  ETOH abuse  Counseled  Discussed resources  Recommend that pt contact insurance to find addiction specialist  [facility] [place] to discuss increased etoh use with psych    - CBC and differential  - Comprehensive metabolic panel  6  Anxiety  Stress management  Activities to divert attention when possible     Conscious breathing techniques as discussed  Coping mechanisms and strategies vary from person to person so try to utilize strategies that you think may work for you (such as meditation, music, etc  )  continue counseling as discussed  Anti anxiety/depression medications as prescribed  - CBC and differential  - Comprehensive metabolic panel  7  Encounter to establish care  Heart healthy, carbohydrate controlled diet- limit red meat, limit saturated fat, moderate salt intake, limit junk food, etc    Regular exercise  Stress management  Routine labwork and screenings as ordered  - CBC and differential  - Comprehensive metabolic panel  - Hemoglobin A1C  - Lipid panel  - Vitamin D 25 hydroxy  8  Screening for HIV (human immunodeficiency virus)  - Human Immunodeficiency Virus 1/2 Antigen / Antibody ( Fourth Generation) with Reflex Testing  9  Vitamin D deficiency  - Vitamin D 25 hydroxy  10  Fatigue, unspecified type  - Hemoglobin A1C  - TSH, 3rd generation  11  Screening for lipid disorders  - Lipid panel  12  Screening for diabetes mellitus (DM)  - Hemoglobin A1C  13  Weight gain  - Hemoglobin A1C  - TSH, 3rd generation  14  Restless leg syndrome  Continue requip  Monitor  Stable currently  Patient was counseled regarding instructions for management which included: impression/diagnosis, risk/benefits of treatment options, importance of compliance with treatment, risk factor reduction, and prognosis  I have reviewed the instructions with the patient answering all questions and patient verbalized understanding  BMI Counseling: Body mass index is 29 kg/m²  The BMI is above normal  Nutrition recommendations include reducing portion sizes, decreasing overall calorie intake and moderation in carbohydrate intake  Exercise recommendations include exercising 3-5 times per week  Depression Screening Follow-up Plan: Patient's depression screening was positive with a PHQ-2 score of 6  Their PHQ-9 score was 22   Patient assessed for underlying major depression  They have no active suicidal ideations  Brief counseling provided and recommend additional follow-up/re-evaluation next office visit  Continue regular follow-up with their psychologist/therapist/psychiatrist who is managing their mental health condition(s)  Tobacco Cessation Counseling: Tobacco cessation counseling and education was provided  The patient is sincerely urged to quit consumption of tobacco  She is not ready to quit tobacco  The numerous health risks of tobacco consumption were discussed  If she decides to quit, there are a number of helpful adjunctive aids, and she can see me to discuss nicotine replacement therapy, chantix, or bupropion anytime in the future  Subjective:      Patient ID: Kasia Garcia is a 60 y o  female who presents to establish care  Pt here to establish care  PMH, meds, allergies, SH, FH reviewed  History: copd, anxiety, depression, asthma  Surgeries:  FH: brother- dm  Mother- emphysema  SH: smokes 1/3 ppd for about 40 years, etoh- drinking 3-4, 22 ounce beers, per day  Used to drink only socially but has been drinking much more last couple of months  Lives alone  Does not work  Has grown children but they dont live near  Current medications: reviewed  Current issues include: increased etoh use  [facility] [employer], manages depression and anxiety  Has only been doing telephone/virtual visits  Has not discussed etoh with psych  Was admitted to psych about one year ago        The following portions of the patient's history were reviewed and updated as appropriate: allergies, current medications, past family history, past medical history, past social history, past surgical history and problem list     Review of Systems   Constitutional: Positive for fatigue and unexpected weight change (weight gain 20-30 lbs over several months)  Negative for activity change and appetite change  HENT: Negative for congestion, sinus pressure and sinus pain  Eyes: Negative for visual disturbance  Respiratory: Positive for shortness of breath (with activity)  Cardiovascular: Negative for chest pain and palpitations  Gastrointestinal: Negative for diarrhea, nausea and vomiting  Endocrine: Negative for cold intolerance, heat intolerance, polydipsia, polyphagia and polyuria  Genitourinary: Negative for dysuria, frequency and pelvic pain  Musculoskeletal: Negative for arthralgias, back pain and myalgias  Skin: Negative for rash and wound  Allergic/Immunologic: Negative for immunocompromised state  Neurological: Negative for dizziness and headaches  Hematological: Does not bruise/bleed easily  Psychiatric/Behavioral: Positive for dysphoric mood and sleep disturbance  Negative for self-injury and suicidal ideas  The patient is nervous/anxious  All other systems reviewed and are negative  Objective:      /70   Pulse 80   Temp (!) 97 °F (36 1 °C) (Temporal)   Resp 18   Ht 5' 0 5" (1 537 m)   Wt 68 5 kg (151 lb)   SpO2 98%   BMI 29 00 kg/m²          Physical Exam  Constitutional:       General: She is not in acute distress  Appearance: She is not ill-appearing  Neck:      Musculoskeletal: Normal range of motion and neck supple  Vascular: No carotid bruit  Cardiovascular:      Rate and Rhythm: Normal rate and regular rhythm  Pulmonary:      Effort: Pulmonary effort is normal  No respiratory distress  Breath sounds: Normal breath sounds  No wheezing, rhonchi or rales  Musculoskeletal:      Right lower leg: No edema  Left lower leg: No edema  Skin:     General: Skin is warm and dry  Coloration: Skin is not jaundiced or pale  Neurological:      General: No focal deficit present  Mental Status: She is alert and oriented to person, place, and time  Cranial Nerves: No cranial nerve deficit  Sensory: No sensory deficit  Psychiatric:         Thought Content:  Thought content normal  Judgment: Judgment normal       Comments: Affect flat  Well groomed  Dressed appropriately for the weather  Calm  Pleasant   Cooperative  Good eye contact

## 2021-05-29 LAB
25(OH)D3+25(OH)D2 SERPL-MCNC: 9.9 NG/ML (ref 30–100)
ALBUMIN SERPL-MCNC: 4.5 G/DL (ref 3.8–4.9)
ALBUMIN/GLOB SERPL: 2.1 {RATIO} (ref 1.2–2.2)
ALP SERPL-CCNC: 85 IU/L (ref 48–121)
ALT SERPL-CCNC: 20 IU/L (ref 0–32)
AST SERPL-CCNC: 25 IU/L (ref 0–40)
BASOPHILS # BLD AUTO: 0.1 X10E3/UL (ref 0–0.2)
BASOPHILS NFR BLD AUTO: 1 %
BILIRUB SERPL-MCNC: 0.2 MG/DL (ref 0–1.2)
BUN SERPL-MCNC: 11 MG/DL (ref 6–24)
BUN/CREAT SERPL: 22 (ref 9–23)
CALCIUM SERPL-MCNC: 9.3 MG/DL (ref 8.7–10.2)
CHLORIDE SERPL-SCNC: 103 MMOL/L (ref 96–106)
CHOLEST SERPL-MCNC: 339 MG/DL (ref 100–199)
CHOLEST/HDLC SERPL: 5.1 RATIO (ref 0–4.4)
CO2 SERPL-SCNC: 23 MMOL/L (ref 20–29)
CREAT SERPL-MCNC: 0.5 MG/DL (ref 0.57–1)
EOSINOPHIL # BLD AUTO: 0.1 X10E3/UL (ref 0–0.4)
EOSINOPHIL NFR BLD AUTO: 1 %
ERYTHROCYTE [DISTWIDTH] IN BLOOD BY AUTOMATED COUNT: 14.2 % (ref 11.7–15.4)
EST. AVERAGE GLUCOSE BLD GHB EST-MCNC: 105 MG/DL
GLOBULIN SER-MCNC: 2.1 G/DL (ref 1.5–4.5)
GLUCOSE SERPL-MCNC: 101 MG/DL (ref 65–99)
HBA1C MFR BLD: 5.3 % (ref 4.8–5.6)
HCT VFR BLD AUTO: 39.5 % (ref 34–46.6)
HDLC SERPL-MCNC: 66 MG/DL
HGB BLD-MCNC: 13.2 G/DL (ref 11.1–15.9)
HIV 1+2 AB+HIV1 P24 AG SERPL QL IA: NON REACTIVE
IMM GRANULOCYTES # BLD: 0.1 X10E3/UL (ref 0–0.1)
IMM GRANULOCYTES NFR BLD: 1 %
LDLC SERPL CALC-MCNC: 238 MG/DL (ref 0–99)
LYMPHOCYTES # BLD AUTO: 1 X10E3/UL (ref 0.7–3.1)
LYMPHOCYTES NFR BLD AUTO: 11 %
MCH RBC QN AUTO: 33.6 PG (ref 26.6–33)
MCHC RBC AUTO-ENTMCNC: 33.4 G/DL (ref 31.5–35.7)
MCV RBC AUTO: 101 FL (ref 79–97)
MONOCYTES # BLD AUTO: 0.7 X10E3/UL (ref 0.1–0.9)
MONOCYTES NFR BLD AUTO: 8 %
NEUTROPHILS # BLD AUTO: 7.7 X10E3/UL (ref 1.4–7)
NEUTROPHILS NFR BLD AUTO: 78 %
PLATELET # BLD AUTO: 334 X10E3/UL (ref 150–450)
POTASSIUM SERPL-SCNC: 4.8 MMOL/L (ref 3.5–5.2)
PROT SERPL-MCNC: 6.6 G/DL (ref 6–8.5)
RBC # BLD AUTO: 3.93 X10E6/UL (ref 3.77–5.28)
SL AMB EGFR AFRICAN AMERICAN: 124 ML/MIN/1.73
SL AMB EGFR NON AFRICAN AMERICAN: 108 ML/MIN/1.73
SL AMB VLDL CHOLESTEROL CALC: 35 MG/DL (ref 5–40)
SODIUM SERPL-SCNC: 139 MMOL/L (ref 134–144)
TRIGL SERPL-MCNC: 182 MG/DL (ref 0–149)
TSH SERPL DL<=0.005 MIU/L-ACNC: 1.08 UIU/ML (ref 0.45–4.5)
WBC # BLD AUTO: 9.6 X10E3/UL (ref 3.4–10.8)

## 2021-06-04 ENCOUNTER — OFFICE VISIT (OUTPATIENT)
Dept: FAMILY MEDICINE CLINIC | Facility: CLINIC | Age: 56
End: 2021-06-04
Payer: COMMERCIAL

## 2021-06-04 VITALS
RESPIRATION RATE: 97 BRPM | HEART RATE: 79 BPM | BODY MASS INDEX: 29.06 KG/M2 | TEMPERATURE: 97.6 F | SYSTOLIC BLOOD PRESSURE: 128 MMHG | WEIGHT: 148 LBS | HEIGHT: 60 IN | DIASTOLIC BLOOD PRESSURE: 72 MMHG

## 2021-06-04 DIAGNOSIS — F10.10 ETOH ABUSE: ICD-10-CM

## 2021-06-04 DIAGNOSIS — Z12.11 SCREENING FOR MALIGNANT NEOPLASM OF COLON: ICD-10-CM

## 2021-06-04 DIAGNOSIS — L02.01 FACIAL ABSCESS: ICD-10-CM

## 2021-06-04 DIAGNOSIS — D23.30 CYST, DERMOID, FACE: ICD-10-CM

## 2021-06-04 DIAGNOSIS — E55.9 VITAMIN D DEFICIENCY: ICD-10-CM

## 2021-06-04 DIAGNOSIS — E78.2 MIXED HYPERLIPIDEMIA: Primary | ICD-10-CM

## 2021-06-04 PROCEDURE — 3008F BODY MASS INDEX DOCD: CPT | Performed by: NURSE PRACTITIONER

## 2021-06-04 PROCEDURE — 99214 OFFICE O/P EST MOD 30 MIN: CPT | Performed by: NURSE PRACTITIONER

## 2021-06-04 PROCEDURE — 4004F PT TOBACCO SCREEN RCVD TLK: CPT | Performed by: NURSE PRACTITIONER

## 2021-06-04 RX ORDER — ROSUVASTATIN CALCIUM 20 MG/1
20 TABLET, COATED ORAL DAILY
Qty: 30 TABLET | Refills: 5 | Status: SHIPPED | OUTPATIENT
Start: 2021-06-04 | End: 2022-01-18 | Stop reason: SDUPTHER

## 2021-06-04 RX ORDER — ERGOCALCIFEROL 1.25 MG/1
50000 CAPSULE ORAL WEEKLY
Qty: 4 CAPSULE | Refills: 5 | Status: SHIPPED | OUTPATIENT
Start: 2021-06-04 | End: 2021-12-06

## 2021-06-04 RX ORDER — CEPHALEXIN 500 MG/1
500 CAPSULE ORAL EVERY 8 HOURS SCHEDULED
Qty: 21 CAPSULE | Refills: 0 | Status: SHIPPED | OUTPATIENT
Start: 2021-06-04 | End: 2021-06-11

## 2021-06-04 NOTE — PROGRESS NOTES
Assessment/Plan:  1  Mixed hyperlipidemia  Heart healthy diet  otc fish oil  Statin  Regular exercise  Weight loss  - rosuvastatin (CRESTOR) 20 MG tablet; Take 1 tablet (20 mg total) by mouth daily  Dispense: 30 tablet; Refill: 5  2  ETOH abuse  Counseled  Anticipatory guidance  3  Vitamin D deficiency  - ergocalciferol (VITAMIN D2) 50,000 units; Take 1 capsule (50,000 Units total) by mouth once a week  Dispense: 4 capsule; Refill: 5  4  Facial abscess  Abx  Warm compresses  Monitor    - cephalexin (KEFLEX) 500 mg capsule; Take 1 capsule (500 mg total) by mouth every 8 (eight) hours for 7 days  Dispense: 21 capsule; Refill: 0  5  Cyst, dermoid, face  - Ambulatory referral to Dermatology; Future  6  Screening for malignant neoplasm of colon  - Cologuard; Future      Patient was counseled regarding instructions for management which included: impression/diagnosis, risk/benefits of treatment options, importance of compliance with treatment, risk factor reduction, and prognosis  I have reviewed the instructions with the patient answering all questions and patient verbalized understanding  Subjective:      Patient ID: Johnathon Solis is a 60 y o  female who presents for follow up     Here for follow up and lab review  Has been trying to decrease ETOH intake and is down to 1 beer a day  Still seeing psych  Feels "okay" with mood at this time  Denies any suicidal ideation  No panic attacks  Has a cyst on her face that is very swollen and pain  Has had recurrent abscess in same spot and was told was due to "a bad tooth" but has had teeth removed and it came back  Painful to touch  No fever  Getting worse over past 3 days  The following portions of the patient's history were reviewed and updated as appropriate: allergies, current medications, past family history, past medical history, past social history, past surgical history and problem list     Review of Systems   Constitutional: Negative for fever  HENT: Negative for dental problem  Respiratory: Negative for chest tightness and shortness of breath  Cardiovascular: Negative for chest pain, palpitations and leg swelling  Gastrointestinal: Negative for abdominal pain  Musculoskeletal: Negative for arthralgias and myalgias  Skin: Positive for wound (abscess right cheek)  Allergic/Immunologic: Negative for immunocompromised state  Neurological: Negative for dizziness and headaches  Hematological: Negative for adenopathy  Psychiatric/Behavioral: Positive for dysphoric mood  Negative for self-injury and suicidal ideas  The patient is nervous/anxious            Objective:  Recent Results (from the past 672 hour(s))   CBC and differential    Collection Time: 05/28/21 10:02 AM   Result Value Ref Range    White Blood Cell Count 9 6 3 4 - 10 8 x10E3/uL    Red Blood Cell Count 3 93 3 77 - 5 28 x10E6/uL    Hemoglobin 13 2 11 1 - 15 9 g/dL    HCT 39 5 34 0 - 46 6 %     (H) 79 - 97 fL    MCH 33 6 (H) 26 6 - 33 0 pg    MCHC 33 4 31 5 - 35 7 g/dL    RDW 14 2 11 7 - 15 4 %    Platelet Count 828 925 - 450 x10E3/uL    Neutrophils 78 Not Estab  %    Lymphocytes 11 Not Estab  %    Monocytes 8 Not Estab  %    Eosinophils 1 Not Estab  %    Basophils PCT 1 Not Estab  %    Neutrophils (Absolute) 7 7 (H) 1 4 - 7 0 x10E3/uL    Lymphocytes (Absolute) 1 0 0 7 - 3 1 x10E3/uL    Monocytes (Absolute) 0 7 0 1 - 0 9 x10E3/uL    Eosinophils (Absolute) 0 1 0 0 - 0 4 x10E3/uL    Basophils ABS 0 1 0 0 - 0 2 x10E3/uL    Immature Granulocytes 1 Not Estab  %    Immature Granulocytes (Absolute) 0 1 0 0 - 0 1 x10E3/uL   Comprehensive metabolic panel    Collection Time: 05/28/21 10:02 AM   Result Value Ref Range    Glucose, Random 101 (H) 65 - 99 mg/dL    BUN 11 6 - 24 mg/dL    Creatinine 0 50 (L) 0 57 - 1 00 mg/dL    eGFR Non  108 >59 mL/min/1 73    eGFR  124 >59 mL/min/1 73    SL AMB BUN/CREATININE RATIO 22 9 - 23    Sodium 139 134 - 144 mmol/L Potassium 4 8 3 5 - 5 2 mmol/L    Chloride 103 96 - 106 mmol/L    CO2 23 20 - 29 mmol/L    CALCIUM 9 3 8 7 - 10 2 mg/dL    Protein, Total 6 6 6 0 - 8 5 g/dL    Albumin 4 5 3 8 - 4 9 g/dL    Globulin, Total 2 1 1 5 - 4 5 g/dL    Albumin/Globulin Ratio 2 1 1 2 - 2 2    TOTAL BILIRUBIN 0 2 0 0 - 1 2 mg/dL    Alk Phos Isoenzymes 85 48 - 121 IU/L    AST 25 0 - 40 IU/L    ALT 20 0 - 32 IU/L   Hemoglobin A1C    Collection Time: 05/28/21 10:02 AM   Result Value Ref Range    Hemoglobin A1C 5 3 4 8 - 5 6 %    Estimated Average Glucose 105 mg/dL   Lipid panel    Collection Time: 05/28/21 10:02 AM   Result Value Ref Range    Cholesterol, Total 339 (H) 100 - 199 mg/dL    Triglycerides 182 (H) 0 - 149 mg/dL    HDL 66 >39 mg/dL    VLDL Cholesterol Calculated 35 5 - 40 mg/dL    LDL Calculated 238 (H) 0 - 99 mg/dL    T  Chol/HDL Ratio 5 1 (H) 0 0 - 4 4 ratio   TSH, 3rd generation    Collection Time: 05/28/21 10:02 AM   Result Value Ref Range    TSH 1 080 0 450 - 4 500 uIU/mL   Vitamin D 25 hydroxy    Collection Time: 05/28/21 10:02 AM   Result Value Ref Range    25-HYDROXY VIT D 9 9 (L) 30 0 - 100 0 ng/mL   Human Immunodeficiency Virus 1/2 Antigen / Antibody ( Fourth Generation) with Reflex Testing    Collection Time: 05/28/21 10:02 AM   Result Value Ref Range    HIV Screen 4th Generation wRflx Non Reactive Non Reactive   Reviewed lab/diagnostic results with pt including both normal and abnormal findings  In depth counseling and instructions given  All questions answered during visit  /72   Pulse 79   Temp 97 6 °F (36 4 °C) (Temporal)   Resp (!) 97   Ht 5' 0 05" (1 525 m)   Wt 67 1 kg (148 lb)   BMI 28 86 kg/m²          Physical Exam  Constitutional:       General: She is not in acute distress  Appearance: She is not ill-appearing  Cardiovascular:      Rate and Rhythm: Normal rate and regular rhythm  Pulmonary:      Effort: Pulmonary effort is normal  No respiratory distress        Breath sounds: Normal breath sounds  No wheezing  Skin:     General: Skin is warm and dry  Comments: Firm , raised, tender lesion right cheek  Not fluctuant or induration  (+) erythema  approx 2 cm diameter  Neurological:      General: No focal deficit present  Mental Status: She is alert and oriented to person, place, and time  Cranial Nerves: No cranial nerve deficit  Sensory: No sensory deficit  Psychiatric:         Behavior: Behavior normal          Thought Content: Thought content normal          Judgment: Judgment normal       Comments: Affect flat  Well groomed  Dressed appropriately for the weather  Calm  Pleasant   Cooperative

## 2021-06-04 NOTE — PATIENT INSTRUCTIONS
As discussed, your lab values indicate abnormal lipids  Start heart healthy diet as reviewed (limit red meat, saturated fats, alcohol, etc)  Add over the counter fish oil supplement to daily regime , 1000-1200mg capsules, 2 capsules daily  Increase exercise  Cholesterol lowering medication has been ordered and should be taken daily  Crestor 20mg daily  Your vitamin D level is low  Start taking Vitamin D2 50,000 units weekly  Limit alcohol intake as discussed  Stress management  Activities to divert attention when possible  Conscious breathing techniques as discussed  Coping mechanisms and strategies vary from person to person so try to utilize strategies that you think may work for you (such as meditation, music, etc  )  Continue counseling    Anti anxiety/depression medications as prescribed  Cologuard has been ordered for colon cancer screening       Keflex 500mg three times daily for one week to treat infected cyst  Schedule appt with dermatology to evaluate and possibly remove cyst as discussed

## 2021-06-09 ENCOUNTER — HOSPITAL ENCOUNTER (EMERGENCY)
Facility: HOSPITAL | Age: 56
Discharge: HOME | End: 2021-06-09
Attending: EMERGENCY MEDICINE
Payer: COMMERCIAL

## 2021-06-09 VITALS
SYSTOLIC BLOOD PRESSURE: 195 MMHG | HEART RATE: 70 BPM | WEIGHT: 148 LBS | RESPIRATION RATE: 20 BRPM | DIASTOLIC BLOOD PRESSURE: 89 MMHG | TEMPERATURE: 97.5 F | OXYGEN SATURATION: 100 % | BODY MASS INDEX: 28.86 KG/M2

## 2021-06-09 DIAGNOSIS — L08.9 INFECTED SEBACEOUS CYST OF SKIN: Primary | ICD-10-CM

## 2021-06-09 DIAGNOSIS — L72.3 INFECTED SEBACEOUS CYST OF SKIN: Primary | ICD-10-CM

## 2021-06-09 PROCEDURE — 87070 CULTURE OTHR SPECIMN AEROBIC: CPT | Performed by: PHYSICIAN ASSISTANT

## 2021-06-09 PROCEDURE — 99284 EMERGENCY DEPT VISIT MOD MDM: CPT | Performed by: PHYSICIAN ASSISTANT

## 2021-06-09 PROCEDURE — 10060 I&D ABSCESS SIMPLE/SINGLE: CPT | Performed by: PHYSICIAN ASSISTANT

## 2021-06-09 PROCEDURE — 99283 EMERGENCY DEPT VISIT LOW MDM: CPT

## 2021-06-09 PROCEDURE — 87205 SMEAR GRAM STAIN: CPT | Performed by: PHYSICIAN ASSISTANT

## 2021-06-09 RX ORDER — SULFAMETHOXAZOLE AND TRIMETHOPRIM 800; 160 MG/1; MG/1
1 TABLET ORAL 2 TIMES DAILY
Qty: 14 TABLET | Refills: 0 | Status: SHIPPED | OUTPATIENT
Start: 2021-06-09 | End: 2021-06-16

## 2021-06-09 RX ORDER — SULFAMETHOXAZOLE AND TRIMETHOPRIM 800; 160 MG/1; MG/1
1 TABLET ORAL ONCE
Status: COMPLETED | OUTPATIENT
Start: 2021-06-09 | End: 2021-06-09

## 2021-06-09 RX ORDER — LIDOCAINE HYDROCHLORIDE AND EPINEPHRINE 10; 10 MG/ML; UG/ML
20 INJECTION, SOLUTION INFILTRATION; PERINEURAL ONCE
Status: COMPLETED | OUTPATIENT
Start: 2021-06-09 | End: 2021-06-09

## 2021-06-09 RX ADMIN — SULFAMETHOXAZOLE AND TRIMETHOPRIM 1 TABLET: 800; 160 TABLET ORAL at 11:55

## 2021-06-09 RX ADMIN — LIDOCAINE HYDROCHLORIDE,EPINEPHRINE BITARTRATE 20 ML: 10; .01 INJECTION, SOLUTION INFILTRATION; PERINEURAL at 11:55

## 2021-06-09 NOTE — ED PROVIDER NOTES
History  Chief Complaint   Patient presents with    Abscess     facial abscess for months  had some teeth pulled a month ago and now seems to be getting worse     60year old female hx anxiety, dental abscesses presents with R facial swelling x5 days  She reports having a facial abscess on her R cheek approx 2 cm  States this is chronic and has frequently needed this drained  She saw her PCP who started her on keflex for the facial abscess  She has been applying warm compresses to site  She has worsened pain today so she came in for evaluation  She has previously required them to be drained in the past   She has never seen a surgeon for this  No fever or chills  No trismus or drooling  No dental pain  No other injuries or complaints  Prior to Admission Medications   Prescriptions Last Dose Informant Patient Reported? Taking?    FLUoxetine (PROzac) 20 mg capsule  Self Yes No   Sig: Take 20 mg by mouth 3 (three) times a day   QUEtiapine (SEROquel) 100 mg tablet  Self Yes No   Sig: Take 100 mg by mouth daily at bedtime   albuterol (PROVENTIL HFA,VENTOLIN HFA) 90 mcg/act inhaler  Self No No   Sig: Inhale 2 puffs every 6 (six) hours as needed for wheezing for up to 14 doses   cephalexin (KEFLEX) 500 mg capsule   No No   Sig: Take 1 capsule (500 mg total) by mouth every 8 (eight) hours for 7 days   ergocalciferol (VITAMIN D2) 50,000 units   No No   Sig: Take 1 capsule (50,000 Units total) by mouth once a week   mirtazapine (REMERON) 15 mg tablet  Self Yes No   Sig: Take 15 mg by mouth daily   rOPINIRole (REQUIP) 0 5 mg tablet  Self Yes No   Sig: Take 0 5 mg by mouth daily at bedtime   rosuvastatin (CRESTOR) 20 MG tablet   No No   Sig: Take 1 tablet (20 mg total) by mouth daily      Facility-Administered Medications: None       Past Medical History:   Diagnosis Date    Anxiety     [facility])     [facility] exacerbation ([facility] [address] ) 8/12/2016    Dental abscess 10/2020    Depression  ETOH abuse        History reviewed  No pertinent surgical history  Family History   Problem Relation Age of Onset    Brain cancer Maternal Aunt     Cancer Other     Substance Abuse Neg Hx     Mental illness Neg Hx      I have reviewed and agree with the history as documented  E-Cigarette/Vaping    E-Cigarette Use Never User      E-Cigarette/Vaping Substances    Nicotine No     THC No     CBD No     Flavoring No     Other No     Unknown No      Social History     Tobacco Use    Smoking status: Current Every Day Smoker     Packs/day: 1 00     Types: Cigarettes    Smokeless tobacco: Never Used    Tobacco comment: smokes 1 pack in 3 days   Substance Use Topics    Alcohol use: Yes     Frequency: 2-3 times a week     Drinks per session: 1 or 2     Binge frequency: Never     Comment: states drank today had some beer a couple hours ago    Drug use: No       Review of Systems   Constitutional: Negative for chills and fever  HENT: Negative for sneezing and sore throat  Respiratory: Negative for cough and shortness of breath  Cardiovascular: Negative for chest pain, palpitations and leg swelling  Gastrointestinal: Negative for abdominal pain, constipation, diarrhea, nausea and vomiting  Musculoskeletal: Negative for back pain, gait problem and joint swelling  Skin: Positive for wound  Negative for color change, pallor and rash  Neurological: Negative for dizziness, syncope, weakness, light-headedness, numbness and headaches  All other systems reviewed and are negative  Physical Exam  Physical Exam  Vitals signs and nursing note reviewed  Constitutional:       General: She is not in acute distress  Appearance: Normal appearance  She is well-developed and normal weight  She is not diaphoretic  HENT:      Head: Normocephalic and atraumatic  Jaw: There is normal jaw occlusion  Nose: Nose normal    Eyes:      Extraocular Movements: Extraocular movements intact  Conjunctiva/sclera: Conjunctivae normal    Neck:      Musculoskeletal: Normal range of motion  Cardiovascular:      Rate and Rhythm: Normal rate and regular rhythm  Pulses: Normal pulses  Heart sounds: Normal heart sounds  No murmur  No friction rub  No gallop  Pulmonary:      Effort: Pulmonary effort is normal  No respiratory distress  Breath sounds: Normal breath sounds  No stridor  No wheezing or rales  Comments: Sp02 is 100% indicating adequate oxygenation on room air  Musculoskeletal: Normal range of motion  Skin:     General: Skin is warm  Capillary Refill: Capillary refill takes less than 2 seconds  Coloration: Skin is not pale  Findings: No erythema or rash  Neurological:      Mental Status: She is alert  Mental status is at baseline  Vital Signs  ED Triage Vitals [06/09/21 1112]   Temperature Pulse Respirations Blood Pressure SpO2   97 5 °F (36 4 °C) 70 20 (!) 195/89 100 %      Temp Source Heart Rate Source Patient Position - Orthostatic VS BP Location FiO2 (%)   Tympanic Monitor Sitting Left arm --      Pain Score       --           Vitals:    06/09/21 1112   BP: (!) 195/89   Pulse: 70   Patient Position - Orthostatic VS: Sitting         Visual Acuity      ED Medications  Medications   sulfamethoxazole-trimethoprim (BACTRIM DS) 800-160 mg per tablet 1 tablet (1 tablet Oral Given 6/9/21 1155)   lidocaine-epinephrine (XYLOCAINE/EPINEPHRINE) 1 %-1:100,000 injection 20 mL (20 mL Infiltration Given 6/9/21 1155)       Diagnostic Studies  Results Reviewed     Procedure Component Value Units Date/Time    Wound culture and Gram stain [[id number]] Collected: 06/09/21 1236    Lab Status: In process Specimen: Wound from Head Updated: 06/09/21 1239                 No orders to display              Procedures  Incision and drain    Date/Time: 6/9/2021 4:50 PM  Performed by: Viji Augustin PA-C  Authorized by:  Viji Augustin PA-C   [place] Protocol:  Procedure performed by: Cheyenne MIN  Consent: Verbal consent obtained  Risks and benefits: risks, benefits and alternatives were discussed  Consent given by: patient  Patient understanding: patient states understanding of the procedure being performed  Patient consent: the patient's understanding of the procedure matches consent given  Procedure consent: procedure consent matches procedure scheduled  Relevant documents: relevant documents present and verified  Test results: test results available and properly labeled  Site marked: the operative site was marked  Radiology Images displayed and confirmed  If images not available, report reviewed: imaging studies available  Required items: required blood products, implants, devices, and special equipment available  Patient identity confirmed: verbally with patient      Patient location:  ED  Location:     Type:  Abscess    Size:  2x2 cm    Location:  Head/neck    Head/neck location:  Face  Pre-procedure details:     Skin preparation:  Betadine  Anesthesia (see MAR for exact dosages): Anesthesia method:  Local infiltration    Local anesthetic:  Lidocaine 1% WITH epi  Procedure details:     Complexity:  Simple    Needle aspiration: yes      Needle size:  25 G    Drainage:  Purulent and bloody (sebaceous cyst material)    Drainage amount: Moderate    Wound treatment:  Wound left open    Packing materials:  None  Post-procedure details:     Patient tolerance of procedure: Tolerated well, no immediate complications             ED Course                             SBIRT 22yo+      Most Recent Value   SBIRT (22 yo +)   In order to provide better care to our patients, we are screening all of our patients for alcohol and drug use  Would it be okay to ask you these screening questions?   No Filed at: 06/09/2021 1137                    MDM  Number of Diagnoses or Management Options  Infected sebaceous cyst of skin:   Diagnosis management comments: Moderate purulent drainage emitted on I&D followed by copious sebaceous cyst material   Will add bactrim to abx regimen  Sent for wound culture  Given plastic surgery follow up for re-eval and possible cyst removal  Gave patient proper education regarding diagnosis  Answered all questions  Return to ED for any worsening of symptoms otherwise follow up with primary care physician for re-evaluation  Discussed plan with patient who verbalized understanding and agreed to plan  Amount and/or Complexity of Data Reviewed  Review and summarize past medical records: yes  Discuss the patient with other providers: yes        Disposition  Final diagnoses:   Infected sebaceous cyst of skin     Time reflects when diagnosis was documented in both MDM as applicable and the Disposition within this note     Time User Action Codes Description Comment    6/9/2021 12:27 PM Johan Workman Add [L72 3,  L08 9] Infected sebaceous cyst of skin       ED Disposition     ED Disposition Condition Date/Time Comment    Discharge Stable Wed Jun 9, 2021 12:27 PM Johnathon Solis discharge to home/self care              Follow-up Information     Follow up With Specialties Details Why [address] MD Plastic Surgery Call today to make follow up appointment for infected sebaceous cyst, possible cyst removal [address] Emergency Department Emergency Medicine Go to  As needed [address] [address] [address] [address] Emergency Department, [place], [place], [address]          Discharge Medication List as of 6/9/2021 12:29 PM      START taking these medications    Details   sulfamethoxazole-trimethoprim (BACTRIM DS) 800-160 mg per tablet Take 1 tablet by mouth 2 (two) times a day for 7 days smx-tmp DS (BACTRIM) 800-160 mg tabs (1tab q12 D10), Starting Wed 6/9/2021, Until Wed 6/16/2021, Normal         CONTINUE these medications which have NOT CHANGED    Details   albuterol (PROVENTIL HFA,VENTOLIN HFA) 90 mcg/act inhaler Inhale 2 puffs every 6 (six) hours as needed for wheezing for up to 14 doses, Starting Fri 5/28/2021, Normal      cephalexin (KEFLEX) 500 mg capsule Take 1 capsule (500 mg total) by mouth every 8 (eight) hours for 7 days, Starting Fri 6/4/2021, Until Fri 6/11/2021, Normal      ergocalciferol (VITAMIN D2) 50,000 units Take 1 capsule (50,000 Units total) by mouth once a week, Starting Fri 6/4/2021, Normal      FLUoxetine (PROzac) 20 mg capsule Take 20 mg by mouth 3 (three) times a day, Historical Med      mirtazapine (REMERON) 15 mg tablet Take 15 mg by mouth daily, Historical Med      QUEtiapine (SEROquel) 100 mg tablet Take 100 mg by mouth daily at bedtime, Historical Med      rOPINIRole (REQUIP) 0 5 mg tablet Take 0 5 mg by mouth daily at bedtime, Starting Fri 11/6/2020, Historical Med      rosuvastatin (CRESTOR) 20 MG tablet Take 1 tablet (20 mg total) by mouth daily, Starting Fri 6/4/2021, Normal           No discharge procedures on file      PDMP Review     None          ED Provider  Electronically Signed by           Raya Hopkins PA-C  06/09/21 0800

## 2021-06-10 ENCOUNTER — TELEPHONE (OUTPATIENT)
Dept: FAMILY MEDICINE CLINIC | Facility: CLINIC | Age: 56
End: 2021-06-10

## 2021-06-12 LAB
BACTERIA WND AEROBE CULT: ABNORMAL
GRAM STN SPEC: ABNORMAL
GRAM STN SPEC: ABNORMAL

## 2021-06-29 ENCOUNTER — CONSULT (OUTPATIENT)
Dept: DERMATOLOGY | Age: 56
End: 2021-06-29
Payer: COMMERCIAL

## 2021-06-29 VITALS — TEMPERATURE: 98.2 F | BODY MASS INDEX: 28.58 KG/M2 | HEIGHT: 61 IN | WEIGHT: 151.4 LBS

## 2021-06-29 DIAGNOSIS — D23.30 CYST, DERMOID, FACE: ICD-10-CM

## 2021-06-29 DIAGNOSIS — L72.0 EPIDERMAL INCLUSION CYST: Primary | ICD-10-CM

## 2021-06-29 PROCEDURE — 4004F PT TOBACCO SCREEN RCVD TLK: CPT | Performed by: DERMATOLOGY

## 2021-06-29 PROCEDURE — 99243 OFF/OP CNSLTJ NEW/EST LOW 30: CPT | Performed by: DERMATOLOGY

## 2021-06-29 PROCEDURE — 3008F BODY MASS INDEX DOCD: CPT | Performed by: DERMATOLOGY

## 2021-06-29 NOTE — PROGRESS NOTES
[facility] Note     Patient Name: Rafiq Olivares  Encounter Date: 6/29/2021     Have you been cared for by a [facility] in the last 3 years and, if so, which one? No    · Have you traveled outside of the [address] [place] [address] in the past 3 months or outside of the [facility] area in the last 2 weeks? No     May we call your Preferred Phone number to discuss your specific medical information? Yes     May we leave a detailed message that includes your specific medical information? Yes      Today's Chief Concerns:   Concern #1:  Lesion of concern     Past Medical History:  Have you personally ever had or currently have any of the following? · Skin cancer (such as Melanoma, Basal Cell Carcinoma, Squamous Cell Carcinoma? (If Yes, please provide more detail)- No  · Eczema: No  · Psoriasis: No  · HIV/AIDS: No  · Hepatitis B or C: No  · Tuberculosis: No  · Systemic Immunosuppression such as Diabetes, Biologic or Immunotherapy, Chemotherapy, Organ Transplantation, Bone Marrow Transplantation (If YES, please provide more detail): No  · Radiation Treatment (If YES, please provide more detail): No  · Any other major medical conditions/concerns? (If Yes, which types)- No    Social History:     What is/was your primary occupation? Unemployed      What are your hobbies/past-times? Denies     Family History:  Have any of your "first degree relatives" (parent, brother, sister, or child) had any of the following       · Skin cancer such as Melanoma or Merkel Cell Carcinoma or Pancreatic Cancer? No  · Eczema, Asthma, Hay Fever or Seasonal Allergies: No  · Psoriasis or Psoriatic Arthritis: No  · Do any other medical conditions seem to run in your family? If Yes, what condition and which relatives?   No    Current Medications:       Current Outpatient Medications:     albuterol (PROVENTIL HFA,VENTOLIN HFA) 90 mcg/act inhaler, Inhale 2 puffs every 6 (six) hours as needed for wheezing for up to 14 doses, Disp: 6 7 g, Rfl: 0    ergocalciferol (VITAMIN D2) 50,000 units, Take 1 capsule (50,000 Units total) by mouth once a week, Disp: 4 capsule, Rfl: 5    FLUoxetine (PROzac) 20 mg capsule, Take 20 mg by mouth 3 (three) times a day, Disp: , Rfl:     mirtazapine (REMERON) 15 mg tablet, Take 15 mg by mouth daily, Disp: , Rfl:     QUEtiapine (SEROquel) 100 mg tablet, Take 150 mg by mouth daily at bedtime , Disp: , Rfl:     rOPINIRole (REQUIP) 0 5 mg tablet, Take 0 5 mg by mouth daily at bedtime, Disp: , Rfl:     rosuvastatin (CRESTOR) 20 MG tablet, Take 1 tablet (20 mg total) by mouth daily, Disp: 30 tablet, Rfl: 5      Review of Systems:  Have you recently had or currently have any of the following? If YES, what are you doing for the problem? · Fever, chills or unintended weight loss: No  · Sudden loss or change in your vision: No  · Nausea, vomiting or blood in your stool: No  · Painful or swollen joints: No  · Wheezing or cough: No  · Changing mole or non-healing wound: No  · Nosebleeds: No  · Excessive sweating: No  · Easy or prolonged bleeding? No  · Over the last 2 weeks, how often have you been bothered by the following problems? · Taking little interest or pleasure in doing things: 3 - Nearly every day  crestor, seroquel  · Feeling down, depressed, or hopeless: 3 - Nearly every day  Crestor, seroquel  · Rapid heartbeat with epinephrine:  No    · FEMALES ONLY:    · Are you pregnant or planning to become pregnant? No  · Are you currently or planning to be nursing or breast feeding? No    · Any known allergies? Allergies   Allergen Reactions    Bee Venom    ·       Physical Exam:     Was a chaperone (Derm Clinical Assistant) present throughout the entire Physical Exam? Yes     Did the Dermatology Team specifically  the patient on the importance of a Full Skin Exam to be sure that nothing is missed clinically?  Yes}  o Did the patient ultimately request or accept a Full Skin Exam?  NO  o Did the patient specifically refuse to have the areas "under-the-bra" examined by the Dermatologist? Tosha Hagen  o Did the patient specifically refuse to have the areas "under-the-underwear" examined by the Dermatologist? YES    CONSTITUTIONAL:   Vitals:    06/29/21 1420   Temp: 98 2 °F (36 8 °C)   TempSrc: Tympanic   Weight: 68 7 kg (151 lb 6 4 oz)   Height: 5' 0 5" (1 537 m)     PSYCH: Normal mood and affect  EYES: Normal conjunctiva  ENT: Normal lips and oral mucosa  CARDIOVASCULAR: No edema  RESPIRATORY: Normal respirations  HEME/LYMPH/IMMUNO:  No regional lymphadenopathy except as noted below in "ASSESSMENT AND PLAN BY DIAGNOSIS"    SKIN:  FULL ORGAN SYSTEM EXAM   Face Normal except as noted below in Assessment        Assessment and Plan by Diagnosis:    History of Present Condition:     Duration:  How long has this been an issue for you?    o  2+ years   Location Affected:  Where on the body is this affecting you?    o  right cheek   Quality:  Is there any bleeding, pain, itch, burning/irritation, or redness associated with the skin lesion? o  painful, infected   Severity:  Describe any bleeding, pain, itch, burning/irritation, or redness on a scale of 1 to 10 (with 10 being the worst)  o  3   Timing:  Does this condition seem to be there pretty constantly or do you notice it more at specific times throughout the day? o  constant   Context:  Have you ever noticed that this condition seems to be associated with specific activities you do?    o  denies   Modifying Factors:    o Anything that seems to make the condition worse?    -  denies  o What have you tried to do to make the condition better?     -  cyst drainage (x2)   Associated Signs and Symptoms:  Does this skin lesion seem to be associated with any of the following:  o  SL AMB DERM SIGNS AND SYMPTOMS: Swelling and Pus or Discharge     EPIDERMAL INCLUSION CYST    Physical Exam:   Anatomic Location Affected:  Right cheek   Morphological Description:  Scar overlying cystic papule 1 cm   Pertinent Positives:   Pertinent Negatives: Additional History of Present Condition:  See derm note above  History of infection at the site  It was drained in the emergency room twice  Assessment and Plan:  Based on a thorough discussion of this condition and the management approach to it (including a comprehensive discussion of the known risks, side effects and potential benefits of treatment), the patient (family) agrees to implement the following specific plan:   Recommend to schedule cyst excision    What are epidermal inclusion cysts? Epidermal inclusion cysts are the most common, benign cutaneous cysts  There are many different names for epidermal inclusion cysts, including epidermoid cyst, epidermal cyst, infundibular cyst, inclusion cyst, and keratin cyst  These cysts can occur anywhere on the body and typically present as nodules directly underneath the skin  There is often a visible pore or opening in the center  The cysts are freely moveable and can range from a few millimeters to several centimeters in diameter  The center of epidermoid cysts almost always contains keratin, which has a cheesy appearance, and not sebum  They also do not originate from sebaceous glands  Therefore, epidermal inclusion cysts are not the same as sebaceous cysts  Cysts may remain stable or progressively enlarge over time  There are no reliable predictive factors to tell if an epidermal inclusion cyst will enlarge, become inflamed, or remain quiescent  Infected cysts tend to become larger, turn red, and are more noticeable to the patient  There may be accompanying pain and discomfort  What causes epidermal inclusion cysts? Epidermal inclusion cysts often appear out of the blue and are not contagious  They are due to a proliferation of epidermal cells within the dermis and are more common in men than women   They occur more frequently in patients in their 20s to 45s  Epidermal inclusion cysts by themselves are usually not inherited, but they can be hereditary in rare syndromes such as Ghosh syndrome, nodular elastosis with cysts and comedones (Favre-Racouchot syndrome), and basal cell nevus syndrome (Gorlin syndrome)  Elderly patients with chronic sun-damaged skin areas have a higher likelihood of developing epidermoid cysts  They often occur in areas where hair follicles have been inflamed or repeatedly irritated are more frequent in patients with acne vulgaris  In the  period, they are called milia  Patients on BRAF inhibitors such as imiquimod and cyclosporine have a higher incidence of epidermoid cysts of the face  How do we diagnose an epidermal inclusion cyst?  Epidermoid inclusion cysts are often diagnosed by history and physical exam  There is usually no need for biopsy prior to removal   Radiographic and laboratory exams, such as ultrasound studies, are unnecessary and not typically ordered unless the practitioner suspects a genetic condition  What is the treatment for an epidermal inclusion cyst?  Inflamed, uninfected epidermal inclusion cysts rarely resolve spontaneously without therapy or surgical intervention  Treatment is not emergent unless desired by the patient  Definitive treatment is via surgical excision with walls intact  This method will prevent recurrence  This is best done when the cyst is not inflamed, to decrease the probability of rupture during surgery  - A local anesthetic will be injected around the cyst  - A small incision is made in the skin overlying the cyst, and contents are expressed  - The incision is repaired with sutures    Another option is to use a 4mm punch biopsy with cyst extraction through the defect  Incision and drainage is often needed if the cyst is infected or inflamed  If there is surrounding cellulitis, oral antibiotic therapy may be necessary   The common agents used target methicillin sensitive Staphylococcal aureus and methicillin resistant S aureus in areas of high prevalence         Scribe Attestation    I,:  Yvonne Guerra am acting as a scribe while in the presence of the attending physician :       I,:  Lizett Sparks MD personally performed the services described in this documentation    as scribed in my presence :

## 2021-06-29 NOTE — PATIENT INSTRUCTIONS
Assessment and Plan:  Based on a thorough discussion of this condition and the management approach to it (including a comprehensive discussion of the known risks, side effects and potential benefits of treatment), the patient (family) agrees to implement the following specific plan:   Recommend to schedule cyst excision    What are epidermal inclusion cysts? Epidermal inclusion cysts are the most common, benign cutaneous cysts  There are many different names for epidermal inclusion cysts, including epidermoid cyst, epidermal cyst, infundibular cyst, inclusion cyst, and keratin cyst  These cysts can occur anywhere on the body and typically present as nodules directly underneath the skin  There is often a visible pore or opening in the center  The cysts are freely moveable and can range from a few millimeters to several centimeters in diameter  The center of epidermoid cysts almost always contains keratin, which has a cheesy appearance, and not sebum  They also do not originate from sebaceous glands  Therefore, epidermal inclusion cysts are not the same as sebaceous cysts  Cysts may remain stable or progressively enlarge over time  There are no reliable predictive factors to tell if an epidermal inclusion cyst will enlarge, become inflamed, or remain quiescent  Infected cysts tend to become larger, turn red, and are more noticeable to the patient  There may be accompanying pain and discomfort  What causes epidermal inclusion cysts? Epidermal inclusion cysts often appear out of the blue and are not contagious  They are due to a proliferation of epidermal cells within the dermis and are more common in men than women  They occur more frequently in patients in their 20s to 45s   Epidermal inclusion cysts by themselves are usually not inherited, but they can be hereditary in rare syndromes such as Ghosh syndrome, nodular elastosis with cysts and comedones (Favre-Racouchot syndrome), and basal cell nevus syndrome (Gorlin syndrome)  Elderly patients with chronic sun-damaged skin areas have a higher likelihood of developing epidermoid cysts  They often occur in areas where hair follicles have been inflamed or repeatedly irritated are more frequent in patients with acne vulgaris  In the  period, they are called milia  Patients on BRAF inhibitors such as imiquimod and cyclosporine have a higher incidence of epidermoid cysts of the face  How do we diagnose an epidermal inclusion cyst?  Epidermoid inclusion cysts are often diagnosed by history and physical exam  There is usually no need for biopsy prior to removal   Radiographic and laboratory exams, such as ultrasound studies, are unnecessary and not typically ordered unless the practitioner suspects a genetic condition  What is the treatment for an epidermal inclusion cyst?  Inflamed, uninfected epidermal inclusion cysts rarely resolve spontaneously without therapy or surgical intervention  Treatment is not emergent unless desired by the patient  Definitive treatment is via surgical excision with walls intact  This method will prevent recurrence  This is best done when the cyst is not inflamed, to decrease the probability of rupture during surgery  - A local anesthetic will be injected around the cyst  - A small incision is made in the skin overlying the cyst, and contents are expressed  - The incision is repaired with sutures    Another option is to use a 4mm punch biopsy with cyst extraction through the defect  Incision and drainage is often needed if the cyst is infected or inflamed  If there is surrounding cellulitis, oral antibiotic therapy may be necessary  The common agents used target methicillin sensitive Staphylococcal aureus and methicillin resistant S aureus in areas of high prevalence

## 2021-07-29 ENCOUNTER — OFFICE VISIT (OUTPATIENT)
Dept: URGENT CARE | Facility: CLINIC | Age: 56
End: 2021-07-29
Payer: COMMERCIAL

## 2021-07-29 VITALS
TEMPERATURE: 97.9 F | SYSTOLIC BLOOD PRESSURE: 148 MMHG | OXYGEN SATURATION: 95 % | DIASTOLIC BLOOD PRESSURE: 79 MMHG | WEIGHT: 155 LBS | HEART RATE: 73 BPM | BODY MASS INDEX: 28.52 KG/M2 | RESPIRATION RATE: 18 BRPM | HEIGHT: 62 IN

## 2021-07-29 DIAGNOSIS — J42 CHRONIC BRONCHITIS, UNSPECIFIED CHRONIC BRONCHITIS TYPE (HCC): ICD-10-CM

## 2021-07-29 DIAGNOSIS — R05.9 COUGH: Primary | ICD-10-CM

## 2021-07-29 PROCEDURE — 99213 OFFICE O/P EST LOW 20 MIN: CPT | Performed by: PHYSICIAN ASSISTANT

## 2021-07-29 PROCEDURE — 3008F BODY MASS INDEX DOCD: CPT | Performed by: DERMATOLOGY

## 2021-07-29 RX ORDER — PREDNISONE 10 MG/1
TABLET ORAL
Qty: 10 TABLET | Refills: 0 | Status: SHIPPED | OUTPATIENT
Start: 2021-07-29 | End: 2021-08-02

## 2021-07-29 RX ORDER — AZITHROMYCIN 250 MG/1
TABLET, FILM COATED ORAL
Qty: 6 TABLET | Refills: 0 | Status: SHIPPED | OUTPATIENT
Start: 2021-07-29 | End: 2021-08-02

## 2021-07-29 NOTE — PATIENT INSTRUCTIONS
Take Azithromycin and prednisone as prescribed  Recommend OTC cough drops as needed for discomfort  Follow up with PCP  Return or be seen in ER with any progressing or worsening symptoms

## 2021-07-29 NOTE — PROGRESS NOTES
[facility] [facility]        NAME: Zeus Brower is a 60 y o  female  : 1965    MRN: [id number]  DATE: 2021  TIME: 10:08 AM    Assessment and Plan   Cough [R05]  1  Cough  azithromycin (ZITHROMAX) 250 mg tablet    predniSONE 10 mg tablet   2  Chronic bronchitis, unspecified chronic bronchitis type [facility])           Patient Instructions     Patient Instructions   Take Azithromycin and prednisone as prescribed  Recommend OTC cough drops as needed for discomfort  Follow up with PCP  Return or be seen in ER with any progressing or worsening symptoms  Follow up with PCP in 3-5 days  Proceed to  ER if symptoms worsen  Chief Complaint     Chief Complaint   Patient presents with    Cough     productive cough yellow sputum x 1 week  Has back and rib pain from coughing  Took Tylenol, advil, inhaler, robitussin         History of Present Illness       Patient is a 56yo F presenting today with cough x 1 week  Patient has a history significant for COPD in which she has a rescue inhaler, states she has been using it more frequently due to increasing SOB, notes she has also been taking advil and robitussin with mild relief  She states symptoms have seemed to be progressing each day  Notes some sputum production, no blood  Denies fever, chills, lightheaded or dizziness, headache  Review of Systems   Review of Systems   Constitutional: Positive for fatigue  Negative for chills and fever  HENT: Negative for ear pain, postnasal drip, sinus pressure, sinus pain and sore throat  Eyes: Negative for pain  Respiratory: Positive for cough, chest tightness, shortness of breath and wheezing  Cardiovascular: Negative for chest pain  Gastrointestinal: Negative for abdominal pain  Musculoskeletal: Negative for arthralgias and myalgias  Skin: Positive for rash  Neurological: Negative for dizziness, syncope, light-headedness and headaches           Current Medications       Current Outpatient Medications:     albuterol (PROVENTIL HFA,VENTOLIN HFA) 90 mcg/act inhaler, Inhale 2 puffs every 6 (six) hours as needed for wheezing for up to 14 doses, Disp: 6 7 g, Rfl: 0    ergocalciferol (VITAMIN D2) 50,000 units, Take 1 capsule (50,000 Units total) by mouth once a week, Disp: 4 capsule, Rfl: 5    FLUoxetine (PROzac) 20 mg capsule, Take 20 mg by mouth 3 (three) times a day, Disp: , Rfl:     mirtazapine (REMERON) 15 mg tablet, Take 15 mg by mouth daily, Disp: , Rfl:     QUEtiapine (SEROquel) 100 mg tablet, Take 150 mg by mouth daily at bedtime , Disp: , Rfl:     rOPINIRole (REQUIP) 0 5 mg tablet, Take 0 5 mg by mouth daily at bedtime, Disp: , Rfl:     rosuvastatin (CRESTOR) 20 MG tablet, Take 1 tablet (20 mg total) by mouth daily, Disp: 30 tablet, Rfl: 5    azithromycin (ZITHROMAX) 250 mg tablet, Take 2 tablets today then 1 tablet daily x 4 days, Disp: 6 tablet, Rfl: 0    predniSONE 10 mg tablet, Take 4 tablets (40 mg total) by mouth daily for 1 day, THEN 3 tablets (30 mg total) daily for 1 day, THEN 2 tablets (20 mg total) daily for 1 day, THEN 1 tablet (10 mg total) daily for 1 day , Disp: 10 tablet, Rfl: 0    Current Allergies     Allergies as of 07/29/2021 - Reviewed 07/29/2021   Allergen Reaction Noted    Bee venom  11/17/2020            The following portions of the patient's history were reviewed and updated as appropriate: allergies, current medications, past family history, past medical history, past social history, past surgical history and problem list      Past Medical History:   Diagnosis Date    Anxiety     COPD [facility]chronic obstructive pulmonary disease) [facility] [address] )     COPD exacerbation ([facility] [address] ) 8/12/2016    Dental abscess 10/2020    Depression     ETOH abuse        No past surgical history on file      Family History   Problem Relation Age of Onset    Brain cancer Maternal Aunt     Cancer Other     Substance Abuse Neg Hx     Mental illness Neg Hx          Medications have been verified  Objective   /79   Pulse 73   Temp 97 9 °F (36 6 °C)   Resp 18   Ht 5' 2" (1 575 m)   Wt 70 3 kg (155 lb)   SpO2 95%   BMI 28 35 kg/m²        Physical Exam     Physical Exam  Vitals reviewed  Constitutional:       General: She is not in acute distress  Appearance: Normal appearance  She is not toxic-appearing  HENT:      Head: Normocephalic and atraumatic  Right Ear: Tympanic membrane, ear canal and external ear normal       Left Ear: Tympanic membrane, ear canal and external ear normal       Nose: Nose normal       Mouth/Throat:      Mouth: Mucous membranes are moist       Pharynx: Oropharynx is clear  Eyes:      Conjunctiva/sclera: Conjunctivae normal       Pupils: Pupils are equal, round, and reactive to light  Cardiovascular:      Rate and Rhythm: Normal rate and regular rhythm  Pulses: Normal pulses  Heart sounds: Normal heart sounds  Pulmonary:      Comments: Effort of breathing normal, no accessory muscle use, no respiratory distress  Mild tenderness to palpation of anterior and lateral chest wall upon palpation  No wheezing  Rhonchi present in posterior lower lung fields bilaterally, cleared with coughing  Musculoskeletal:      Cervical back: Normal range of motion  No tenderness  Lymphadenopathy:      Cervical: No cervical adenopathy  Skin:     General: Skin is warm  Capillary Refill: Capillary refill takes less than 2 seconds  Neurological:      General: No focal deficit present  Mental Status: She is alert and oriented to person, place, and time

## 2021-08-25 ENCOUNTER — PROCEDURE VISIT (OUTPATIENT)
Dept: DERMATOLOGY | Age: 56
End: 2021-08-25
Payer: COMMERCIAL

## 2021-08-25 VITALS — BODY MASS INDEX: 28.34 KG/M2 | TEMPERATURE: 98.6 F | WEIGHT: 154 LBS | HEIGHT: 62 IN

## 2021-08-25 DIAGNOSIS — L72.0 EPIDERMAL INCLUSION CYST: Primary | ICD-10-CM

## 2021-08-25 PROCEDURE — 3008F BODY MASS INDEX DOCD: CPT | Performed by: PHYSICIAN ASSISTANT

## 2021-08-25 PROCEDURE — 12051 INTMD RPR FACE/MM 2.5 CM/<: CPT | Performed by: DERMATOLOGY

## 2021-08-25 PROCEDURE — 88305 TISSUE EXAM BY PATHOLOGIST: CPT | Performed by: PATHOLOGY

## 2021-08-25 PROCEDURE — 11441 EXC FACE-MM B9+MARG 0.6-1 CM: CPT | Performed by: DERMATOLOGY

## 2021-08-25 NOTE — PROGRESS NOTES
PROCEDURE:  EXCISION WITH INTERMEDIATE LAYERED CLOSURE     Attending: Master Richmond  Assistant: Ladan Sanders    Pre-Op Diagnosis: Epidermal Inclusion Cyst  Post-Op Diagnosis: Same   Benign versus Malignant Benign      Lesion Anatomic Location: Right cheek  Pre-op size: 1 cm  Size of defect:  1 cm  Final repaired wound length:  2 cm    Written and verbal, witnessed informed consent was obtained  I discussed that excision is a method of removing lesions both benign and malignant lesions  A portion of normal skin is often taken to ensure completeness of removal   I reviewed that procedure will include numbing the area,  cutting around and under defect, undermining tissue, and closing the wound with sutures both inside and out  These sutures are usually removed in 7 to 14 days  Risks (bleeding, pain, infection, scarring, recurrence) and benefits discussed  It was discussed with patient that every effort is made to minimize scar, but scarring is influenced also by extrinsic factor such as location, age and genetics  Time Out: performed:  yes  Correct patient: yes  Correct site per Clinic Report: yes  Correct site per Patient Report: yes    LOCAL ANESTHESIA: 1% xylocaine with epi     DESCRIPTION OF PROCEDURE: The patient was brought back into the procedure room, anesthetized locally, prepped and draped in the usual fashion  Using a #15 blade with a scalpel, the lesion was excised in elliptical fashion  The wound was  undermined in the  fascial plane  Hemostasis was achieved with light electrocoagulation  Purpose of undermining was to decrease wound tension and facilitate closure  The wound was closed with subcutaneous sutures as follows:    Deep suture:6-0 Vicryl      Epidermal edge closure was accomplished with superficial sutures as follows:    Superficial suture: 6-0 Vicryl  Superficial suture type: Interrupted     Estimated blood loss less than 3ml      The patient tolerated the procedure well without any complications  The wound was cleaned with sterile saline, dried off, surgical vaseline ointment was applied, and the wound was covered  A pressure dressing was applied for stabilization and light pressure  The patient was given detailed oral and written instructions on postoperative care as detailed in consent  The patient left in good medical condition  POSTOP DISCUSSION DISCUSSION AND INSTRUCTIONS FOR PATIENT      Rationale for Procedure  A skin excision allows the dermatologist to remove a lesion  The procedure involves a local numbing medication and removing the entire lesion  Typically, the lesion is being removed because it is atypical, traumatized, or for significant appearance reasons  The area will be open like a brush burn and allowed to heal    There will be no sutures  Tissue is sent to pathologist who will reconfirm diagnosis and verify completeness of lesion removal     Description of Procedure  We would like to perform a skin excision today  A local anesthetic, similar to the kind that a dentist uses when filling a cavity, will be injected with a very small needle into the skin area to be sampled  The injected skin and tissue underneath should go to sleep and become numbed so that no further pain should be felt  A scalpel will be used to cut around the lesion and tissue will be submitted to pathology for examination  Depending on the diagnosis the lesion will be excised with a certain amount of normal skin to help assure completeness of lesion removal   The physician will discuss in advance the anticipated size and extent of removal    Bleeding will occur, but it will stopped with direct pressure, sutures, and electrocautery  Surgical Vaseline-type ointment will also applied after the procedure to help create a barrier between the wound and the outside world        Risks and Potential Complications  The advantage of a skin excision is that it allows us to remove a problem lesion quickly  Although this usually permits the lesion to heal as soon as possible with the least scarring, there are some risks and potential complications that include but are not limited to the following:  - Some bleeding is normal at time of procedure and some bleeding on gauze is normal  the first few days after surgery  Profuse bleeding and bleeding with swelling and pain should be reported as detailed  below  - Infection is uncommon in skin surgery  Infection should be reported and is indicated by pain, redness, and discharge of purulent material   - Some dull to at time sharp pain could occur initially the day after surgery  Persistent pain or increasing pain days after surgery is not expected and should be reported  - Every effort is made to minimize scar, but location, size, and genetics do play a role in scar appearance  A surgical wound does not achieve its optimal appearance until 6 months  There are several treatments available if scarring would be problematic including scar creams, silicone pad, laser and scar revision   - Skin discoloration can occur especially in people of color  Its important to avoid sun on wound in first 6 months after surgery  Treatment is available if pigment is problematic   - Incomplete removal of the lesion or recurrence of lesion can occur and this would then require further treatment and more surgery   - Nerve Damage/Numbness/Loss of Function is very rare, but is most likely to occur if lesion is large or if it is in a high risk location  - Allergic Reaction to lidocaine is rare  More commonly,  epinephrine is used  with the lidocaine  Occasionally, epinephrine (aka adrenalin) may cause a brief  feeling of anxiety or jitteriness  - The person at the microscope  (pathologist) may provide additional information that was unexpected  This unexpected finding could provoke the need for additional treatment or evaluation      What You Will Need to Do After the Procedure  1  Keep the area clean and dry the first day  Try NOT to remove the bandage for the first day  2  Gently clean the area with soap and water and apply Vaseline ointment (this is over the counter and not a prescription) to the excision  site for up to 2 weeks  3  Apply a clean appropriately sized bandage to area  Gauze and paper tape are recommended for sensitive skin  4  Return for suture removal as instructed (generally 1 week for the face, 2 weeks for the body)  5  Take Acetaminophen (Tylenol) for discomfort, if no contraindications  Do NOT take Ibuprofen or aspirin unless specifically told to do so by your Dermatologist because these medications can make bleeding worse  6  Call our office immediately for signs of infection: fever, chills, increased redness, warmth, tenderness, discomfort/pain, or pus or foul smell coming from the wound  If bleeding is noticed, place a clean cloth over the area and apply firm pressure for thirty minutes  Check the wound ONLY after 30 minutes of direct pressure; do not cheat and sneak a peak, as that does not count  If bleeding persists after 30 minutes of legitimate direct pressure, then try one more round of direct pressure for an additional 10 minutes to the area  Should the bleeding become heavier or not stop after the second attempt, call [facility] directly at [phone] (SKIN) or, if after hours, go to your local Emergency Room/Emergency Department        Scribe Attestation    I,:  Jasmina Lopez am acting as a scribe while in the presence of the attending physician :       I,:  Janette Rebolledo MD personally performed the services described in this documentation    as scribed in my presence :

## 2021-08-31 NOTE — RESULT ENCOUNTER NOTE
Tissue Exam: [id number]  Order: [id number]  Status:  Final result   Visible to patient:  No (inaccessible in [address]) Next appt:  09/07/2021 at 10:15 AM in Family Medicine (LADONNA Mendez) Dx:  Epidermal inclusion cyst  0 Result Notes  Component   Case Report  Surgical Pathology Report                         Case: [id number]                                   Authorizing Provider: Kj Andrews MD      Collected:           08/25/2021 1138              Ordering Location:     [facility]      Received:            08/25/2021 [address] [place] [address]                                                                   Pathologist:           Parveen Chris MD                                                           Specimen:    Skin, Other, A: Right cheek                                                              Final Diagnosis  A  Skin, Right cheek, shave biopsy:   Follicular cyst, infundibular type (epidermal inclusion cyst), inflamed  Electronically signed by Parveen Chris MD on 8/30/2021 at  6:02 PM      Voice mail left to inform patient growth was benign

## 2021-09-15 ENCOUNTER — OFFICE VISIT (OUTPATIENT)
Dept: FAMILY MEDICINE CLINIC | Facility: CLINIC | Age: 56
End: 2021-09-15
Payer: COMMERCIAL

## 2021-09-15 VITALS
OXYGEN SATURATION: 97 % | HEIGHT: 61 IN | SYSTOLIC BLOOD PRESSURE: 160 MMHG | DIASTOLIC BLOOD PRESSURE: 88 MMHG | BODY MASS INDEX: 29.45 KG/M2 | HEART RATE: 66 BPM | WEIGHT: 156 LBS | TEMPERATURE: 97.7 F

## 2021-09-15 DIAGNOSIS — E55.9 VITAMIN D DEFICIENCY: ICD-10-CM

## 2021-09-15 DIAGNOSIS — F41.9 ANXIETY: ICD-10-CM

## 2021-09-15 DIAGNOSIS — I10 ESSENTIAL HYPERTENSION: ICD-10-CM

## 2021-09-15 DIAGNOSIS — F32.9 MAJOR DEPRESSIVE DISORDER, REMISSION STATUS UNSPECIFIED, UNSPECIFIED WHETHER RECURRENT: ICD-10-CM

## 2021-09-15 DIAGNOSIS — E78.2 MIXED HYPERLIPIDEMIA: ICD-10-CM

## 2021-09-15 DIAGNOSIS — F10.10 ETOH ABUSE: ICD-10-CM

## 2021-09-15 DIAGNOSIS — J44.9 CHRONIC OBSTRUCTIVE PULMONARY DISEASE, UNSPECIFIED COPD TYPE (HCC): Primary | ICD-10-CM

## 2021-09-15 DIAGNOSIS — F17.210 CIGARETTE NICOTINE DEPENDENCE WITHOUT COMPLICATION: ICD-10-CM

## 2021-09-15 PROCEDURE — 99214 OFFICE O/P EST MOD 30 MIN: CPT | Performed by: NURSE PRACTITIONER

## 2021-09-15 PROCEDURE — 4004F PT TOBACCO SCREEN RCVD TLK: CPT | Performed by: NURSE PRACTITIONER

## 2021-09-15 RX ORDER — LISINOPRIL 10 MG/1
10 TABLET ORAL DAILY
Qty: 30 TABLET | Refills: 3 | Status: SHIPPED | OUTPATIENT
Start: 2021-09-15 | End: 2021-09-30 | Stop reason: SDUPTHER

## 2021-09-15 NOTE — PROGRESS NOTES
Assessment/Plan:  1  Chronic obstructive pulmonary disease, unspecified COPD type ([facility] [address] )  Stable  Monitor  No change to current tx plan  - CBC and differential; Future  - Comprehensive metabolic panel; Future   2  ETOH abuse  No etoh for one month  Counseled  Anticipatory guidance  - CBC and differential; Future  - Comprehensive metabolic panel; Future  3  Major depressive disorder, remission status unspecified, unspecified whether recurrent  Stable  No suicidal ideation  Monitoir  No change to current tx plan  anticipatory guidance  4  Anxiety  Stress management  Activities to divert attention when possible  Conscious breathing techniques as discussed  Coping mechanisms and strategies vary from person to person so try to utilize strategies that you think may work for you (such as meditation, music, etc  )  Consider or continue counseling as discussed  Anti anxiety/depression medications as prescribed  5  Mixed hyperlipidemia  - CBC and differential; Future  - Comprehensive metabolic panel; Future  - Lipid panel; Future  -6  Vitamin D deficiency  - CBC and differential; Future  - Comprehensive metabolic panel; Future  - Vitamin D 1,25 dihydroxy; Future  - 7  Essential hypertension  Will start Lisinopril 10mg daily for elevated blood pressure  Monitor blood pressure  Bring diary/log of readings to next appointment  Limit salt in diet  Will recheck blood pressure in office in 2 weeks  - lisinopril (ZESTRIL) 10 mg tablet; Take 1 tablet (10 mg total) by mouth daily  Dispense: 30 tablet; Refill: 3  8  Cigarette nicotine dependence without complication  Tobacco Cessation Counseling: Tobacco cessation counseling and education was provided  The patient is sincerely urged to quit consumption of tobacco  She is not ready to quit tobacco  The numerous health risks of tobacco consumption were discussed   If she decides to quit, there are a number of helpful adjunctive aids, and she can see me to discuss nicotine replacement therapy, chantix, or bupropion anytime in the future  Patient was counseled regarding instructions for management which included: impression/diagnosis, risk/benefits of treatment options, importance of compliance with treatment, risk factor reduction, and prognosis  I have reviewed the instructions with the patient answering all questions and patient verbalized understanding  BMI Counseling: Body mass index is 29 97 kg/m²  The BMI is above normal  Nutrition recommendations include reducing portion sizes, decreasing overall calorie intake, moderation in carbohydrate intake, reducing intake of saturated fat and trans fat and reducing intake of cholesterol  Exercise recommendations include exercising 3-5 times per week  Subjective:      Patient ID: Bernarda Sun is a 60 y o  female who presents for follow up    Here for follow up on multiple issues  Denies history of htn but bp has been elevated at every appt  Does not monitor it at home  Willing to start bp meds  No recent breathing issues  Denies headache, dizziness, chest pain, palpitations  Struggles with etoh  Stopped completely one month ago  Trying to quit smoking but not ready  Overall feels well  No specific concerns or issues        The following portions of the patient's history were reviewed and updated as appropriate: allergies, current medications, past family history, past medical history, past social history, past surgical history and problem list     Review of Systems   Constitutional: Negative for unexpected weight change  Respiratory: Negative for cough, chest tightness and shortness of breath  Cardiovascular: Negative for chest pain, palpitations and leg swelling  Gastrointestinal: Negative for abdominal pain  Musculoskeletal: Negative for arthralgias and myalgias  Skin: Negative for color change and pallor  Allergic/Immunologic: Negative for immunocompromised state     Neurological: Negative for dizziness and headaches  Psychiatric/Behavioral: Positive for dysphoric mood  Negative for self-injury  The patient is nervous/anxious  Objective:      /88   Pulse 66   Temp 97 7 °F (36 5 °C) (Temporal)   Ht 5' 0 5" (1 537 m)   Wt 70 8 kg (156 lb)   SpO2 97%   BMI 29 97 kg/m²          Physical Exam  Vitals reviewed  Constitutional:       General: She is not in acute distress  Appearance: She is not ill-appearing  Cardiovascular:      Rate and Rhythm: Normal rate and regular rhythm  Pulmonary:      Effort: Pulmonary effort is normal  No respiratory distress  Breath sounds: Normal breath sounds  No wheezing or rales  Musculoskeletal:      Right lower leg: No edema  Left lower leg: No edema  Skin:     General: Skin is warm and dry  Coloration: Skin is not jaundiced or pale  Neurological:      General: No focal deficit present  Mental Status: She is alert and oriented to person, place, and time  Psychiatric:         Behavior: Behavior normal          Thought Content: Thought content normal          Judgment: Judgment normal       Comments: Affect flat  Well groomed  Dressed appropriately for the weather  Calm  Pleasant   Cooperative  Good eye contact  Converses freely and appropriately

## 2021-09-15 NOTE — PATIENT INSTRUCTIONS
Will start Lisinopril 10mg daily for elevated blood pressure  Monitor blood pressure  Bring diary/log of readings to next appointment  Limit salt in diet     Will recheck blood pressure in office in 2 weeks  Will repeat labwork in 3 months

## 2021-09-30 ENCOUNTER — OFFICE VISIT (OUTPATIENT)
Dept: FAMILY MEDICINE CLINIC | Facility: CLINIC | Age: 56
End: 2021-09-30
Payer: COMMERCIAL

## 2021-09-30 VITALS
DIASTOLIC BLOOD PRESSURE: 100 MMHG | RESPIRATION RATE: 18 BRPM | SYSTOLIC BLOOD PRESSURE: 152 MMHG | HEIGHT: 61 IN | HEART RATE: 88 BPM | OXYGEN SATURATION: 98 % | BODY MASS INDEX: 29.27 KG/M2 | WEIGHT: 155 LBS | TEMPERATURE: 97.3 F

## 2021-09-30 DIAGNOSIS — I10 ESSENTIAL HYPERTENSION: Primary | ICD-10-CM

## 2021-09-30 PROCEDURE — 99213 OFFICE O/P EST LOW 20 MIN: CPT | Performed by: NURSE PRACTITIONER

## 2021-09-30 PROCEDURE — 3008F BODY MASS INDEX DOCD: CPT | Performed by: NURSE PRACTITIONER

## 2021-09-30 RX ORDER — LISINOPRIL 20 MG/1
20 TABLET ORAL DAILY
Qty: 30 TABLET | Refills: 5 | Status: SHIPPED | OUTPATIENT
Start: 2021-09-30 | End: 2022-01-18 | Stop reason: SDUPTHER

## 2021-09-30 NOTE — PATIENT INSTRUCTIONS
Will increase Lisinopril to 20mg daily  Monitor blood pressure  Limit salt in diet  Will recheck blood pressure in office in 2 weeks

## 2021-09-30 NOTE — PROGRESS NOTES
Assessment/Plan:  1  Essential hypertension  Will increase Lisinopril to 20mg daily  Monitor blood pressure  Limit salt in diet  Will recheck blood pressure in office in 2 weeks  - lisinopril (ZESTRIL) 20 mg tablet; Take 1 tablet (20 mg total) by mouth daily  Dispense: 30 tablet; Refill: 5          Subjective:      Patient ID: Charli Whipple is a 60 y o  female who presents for med check    Here for bp and med check  Was started on lisinopril 10mg about 2 weeks ago  Tolerating with no side effects  Has been feeling better since starting medication  No dizziness, headache, chest pain, sob, palpitations        The following portions of the patient's history were reviewed and updated as appropriate: allergies, current medications, past family history, past medical history, past social history, past surgical history and problem list     Review of Systems   Constitutional: Negative for unexpected weight change  Respiratory: Negative for shortness of breath  Cardiovascular: Negative for chest pain, palpitations and leg swelling  Musculoskeletal: Negative for arthralgias and myalgias  Neurological: Negative for dizziness and headaches  Objective:      /98 (BP Location: Right arm, Patient Position: Sitting, Cuff Size: Adult)   Pulse 88   Temp (!) 97 3 °F (36 3 °C) (Temporal)   Resp 18   Ht 5' 0 5" (1 537 m)   Wt 70 3 kg (155 lb)   SpO2 98%   BMI 29 77 kg/m²          Physical Exam  Vitals reviewed  Constitutional:       Appearance: She is not ill-appearing  Neck:      Vascular: No carotid bruit  Cardiovascular:      Rate and Rhythm: Normal rate  Pulmonary:      Effort: Pulmonary effort is normal  No respiratory distress  Skin:     General: Skin is warm and dry  Neurological:      General: No focal deficit present  Mental Status: She is alert and oriented to person, place, and time     Psychiatric:         Mood and Affect: Mood normal          Behavior: Behavior normal

## 2021-10-08 ENCOUNTER — RA CDI HCC (OUTPATIENT)
Dept: OTHER | Facility: HOSPITAL | Age: 56
End: 2021-10-08

## 2021-10-15 ENCOUNTER — TELEPHONE (OUTPATIENT)
Dept: FAMILY MEDICINE CLINIC | Facility: CLINIC | Age: 56
End: 2021-10-15

## 2021-12-03 PROBLEM — F33.9 RECURRENT MAJOR DEPRESSIVE DISORDER (HCC): Status: ACTIVE | Noted: 2021-12-03

## 2021-12-04 DIAGNOSIS — E55.9 VITAMIN D DEFICIENCY: ICD-10-CM

## 2021-12-06 RX ORDER — ERGOCALCIFEROL 1.25 MG/1
50000 CAPSULE ORAL WEEKLY
Qty: 4 CAPSULE | Refills: 4 | Status: SHIPPED | OUTPATIENT
Start: 2021-12-06 | End: 2022-05-18

## 2022-01-18 DIAGNOSIS — I10 ESSENTIAL HYPERTENSION: ICD-10-CM

## 2022-01-18 DIAGNOSIS — E78.2 MIXED HYPERLIPIDEMIA: ICD-10-CM

## 2022-01-18 RX ORDER — LISINOPRIL 20 MG/1
20 TABLET ORAL DAILY
Qty: 30 TABLET | Refills: 5 | Status: SHIPPED | OUTPATIENT
Start: 2022-01-18 | End: 2022-06-17

## 2022-01-18 RX ORDER — ROSUVASTATIN CALCIUM 20 MG/1
20 TABLET, COATED ORAL DAILY
Qty: 30 TABLET | Refills: 5 | Status: SHIPPED | OUTPATIENT
Start: 2022-01-18 | End: 2022-06-17

## 2022-01-18 NOTE — TELEPHONE ENCOUNTER
Requested medication(s) are due for refill today: Yes  Patient has already received a courtesy refill: No  Other reason request has been forwarded to provider: Cardiovascular:  ACE Inhibitors Failed

## 2022-04-21 ENCOUNTER — OFFICE VISIT (OUTPATIENT)
Dept: FAMILY MEDICINE CLINIC | Facility: CLINIC | Age: 57
End: 2022-04-21
Payer: COMMERCIAL

## 2022-04-21 VITALS
HEART RATE: 100 BPM | SYSTOLIC BLOOD PRESSURE: 138 MMHG | HEIGHT: 60 IN | OXYGEN SATURATION: 98 % | DIASTOLIC BLOOD PRESSURE: 80 MMHG | BODY MASS INDEX: 24.94 KG/M2 | TEMPERATURE: 97.2 F | WEIGHT: 127 LBS

## 2022-04-21 DIAGNOSIS — F10.10 ETOH ABUSE: ICD-10-CM

## 2022-04-21 DIAGNOSIS — G89.29 CHRONIC BILATERAL LOW BACK PAIN WITHOUT SCIATICA: ICD-10-CM

## 2022-04-21 DIAGNOSIS — R42 DIZZINESS: ICD-10-CM

## 2022-04-21 DIAGNOSIS — R55 SYNCOPE, UNSPECIFIED SYNCOPE TYPE: Primary | ICD-10-CM

## 2022-04-21 DIAGNOSIS — M54.50 CHRONIC BILATERAL LOW BACK PAIN WITHOUT SCIATICA: ICD-10-CM

## 2022-04-21 DIAGNOSIS — R00.2 PALPITATIONS: ICD-10-CM

## 2022-04-21 DIAGNOSIS — R53.83 FATIGUE, UNSPECIFIED TYPE: ICD-10-CM

## 2022-04-21 DIAGNOSIS — F33.9 RECURRENT MAJOR DEPRESSIVE DISORDER, REMISSION STATUS UNSPECIFIED (HCC): ICD-10-CM

## 2022-04-21 PROCEDURE — 3725F SCREEN DEPRESSION PERFORMED: CPT | Performed by: NURSE PRACTITIONER

## 2022-04-21 PROCEDURE — 3008F BODY MASS INDEX DOCD: CPT | Performed by: NURSE PRACTITIONER

## 2022-04-21 PROCEDURE — 93000 ELECTROCARDIOGRAM COMPLETE: CPT | Performed by: NURSE PRACTITIONER

## 2022-04-21 PROCEDURE — 99214 OFFICE O/P EST MOD 30 MIN: CPT | Performed by: NURSE PRACTITIONER

## 2022-04-21 PROCEDURE — 3079F DIAST BP 80-89 MM HG: CPT | Performed by: NURSE PRACTITIONER

## 2022-04-21 PROCEDURE — 4004F PT TOBACCO SCREEN RCVD TLK: CPT | Performed by: NURSE PRACTITIONER

## 2022-04-21 PROCEDURE — 36415 COLL VENOUS BLD VENIPUNCTURE: CPT | Performed by: NURSE PRACTITIONER

## 2022-04-21 PROCEDURE — 3075F SYST BP GE 130 - 139MM HG: CPT | Performed by: NURSE PRACTITIONER

## 2022-04-21 NOTE — PATIENT INSTRUCTIONS
No alcohol  As discussed, I am highly suspicious that many of your symptoms are secondary to your excessive alcohol use  EKG done in the office did not show any significant abnormality  Will check labs  Schedule carotid ultrasound as ordered  Evaluation by cardiology  Schedule appt with orthopedics  Physical therapy  Abuse of Alcohol   AMBULATORY CARE:   Alcohol abuse  means you drink more than the recommended daily or weekly limits  You may be drinking alcohol regularly or drinking large amounts in a short period of time (binge drinking)  You continue to drink even when it causes legal, work, or relationship problems  Recommended alcohol limits:  One drink is defined as 12 ounces (oz) of beer, 5 oz of wine, or 1 5 oz of liquor such as whiskey  · Men 21 to 64 years  should limit alcohol to 2 drinks a day  Do not have more than 4 drinks in 1 day or more than 14 in 1 week  · All women, and men 72 or older  should limit alcohol to 1 drink in a day  Do not have more than 3 drinks in 1 day or more than 7 in 1 week  Do not drink alcohol if you are pregnant  Common signs and symptoms of alcohol abuse:  Symptoms may be different for individuals  The following are common signs and symptoms of alcohol abuse:  · Loss of interest in activities, work, and school    · Decreased interest in family and friends    · Depression    · Constant thoughts about drinking    · Not able to control the amount you drink    · Restlessness, or erratic and violent behavior    Call your local emergency number (911 in the [address] [facility]), or have someone call if:   · You have sudden chest pain or trouble breathing  · You want to harm yourself or others  · You have a seizure  Seek care immediately if:   · You cannot stop vomiting or you vomit blood  Call your doctor if:   · You have hallucinations (you see or hear things that are not real)  · You have questions or concerns about your condition or care      Health problems alcohol abuse can cause:   · Cancer in your liver, pancreas, stomach, colon, kidney, or breast    · Stroke or a heart attack    · Liver, kidney, or lung disease    · Blackouts, memory loss, brain damage, or dementia    · Diabetes, immune system problems, or thiamine (vitamin B1) deficiency    · Problems for you and your baby if you drink while pregnant    Treatment  can help you understand the reasons you abuse alcohol  Counselors and therapists provide you with support and help you find ways to cope instead of drinking  You may need inpatient treatment to provide a controlled environment  You may need outpatient treatment after your inpatient treatment is complete  · Detoxification (detox)  is a program used to flush alcohol from your body  During detox, medicines are given to help prevent withdrawal symptoms when you stop drinking alcohol  · Brief intervention therapy  helps you think about your alcohol use differently  A healthcare provider helps you set goals to decrease the amount of alcohol you drink  Therapy may continue after you leave the hospital     · Vitamin supplements  such as B1 may be needed  Alcohol can make it hard for your body to absorb enough vitamin B1  You may be given vitamin B1 if you have low levels  It is also given to prevent brain damage from alcohol use  Manage alcohol use:   · Work with healthcare providers on goals to drink less  This can help prevent health problems  For example, you may start by planning your weekly alcohol use  It will be easier to have fewer drinks if you plan ahead  · Have food when you drink alcohol  Food will prevent alcohol from getting into your system too quickly  Eat before you have your first alcohol drink  · Time your drinks carefully  Have no more than 1 drink in an hour  Have a liquid such as water, coffee, or a soft drink between alcohol drinks  · Do not drive if you have had alcohol  Plan ahead so you have a safe ride home   Make sure someone who has not been drinking can help you get home safely  Plan to use a taxi or other ride service, if needed  · Do not drink alcohol if you are taking medicine  Alcohol is dangerous when you combine it with certain medicines, such as acetaminophen or blood pressure medicine  Talk to your healthcare provider about all the medicines you currently take  Follow up with your doctor as directed:  Write down your questions so you remember to ask them during your visits  For support and more information:   · Alcoholics Anonymous  Web Address: http://www [employer]/    · Substance Abuse and [address]  Web Address: https://[employer]/    © [facility] 2022 Information is for End User's use only and may not be sold, redistributed or otherwise used for commercial purposes  All illustrations and images included in CareNotes® are the copyrighted property of A [employer] A M , Inc  or [facility] Beto Walter   The above information is an  only  It is not intended as medical advice for individual conditions or treatments  Talk to your doctor, nurse or pharmacist before following any medical regimen to see if it is safe and effective for you

## 2022-04-21 NOTE — PROGRESS NOTES
Assessment/Plan:  1  Syncope, unspecified syncope type  Suspect etiology is excessive etoh   Pt not answering truthfully when questioned and changed answer several times  Smelled strongly of etoh and denies recent drink but then admitted last drink last night  Will initiate eval of syncope but long d/w advising of my suspicions that her use of etoh most likely causing and it will be very difficult to differentiate diagnoses without full disclosure on her part  Pt verbalized understanding    - CBC and differential  - Comprehensive metabolic panel  - TSH, 3rd generation  - VAS carotid complete study; Future  - Ambulatory Referral to Cardiology; Future  - POCT ECG  2  Chronic bilateral low back pain without sciatica  Will refer to ortho and PT  Will not prescribe pain meds or muscle relaxers secondary to pt's current and prob frequent/excessive etoh use  - Ambulatory Referral to Orthopedic Surgery; Future  - Ambulatory Referral to Physical Therapy; Future  3  ETOH abuse  Counseled regarding risks  Pt not forthcoming with actual etoh use  - Comprehensive metabolic panel  4  Recurrent major depressive disorder, remission status unspecified ([facility] [address] )  managed by psych  No suicidal ideation  Continue current meds/tx as per mental health providers  5  Fatigue, unspecified type  - CBC and differential  - Comprehensive metabolic panel  - TSH, 3rd generation  6  Palpitations  - CBC and differential  - Comprehensive metabolic panel  - TSH, 3rd generation  - VAS carotid complete study; Future  - Ambulatory Referral to Cardiology; Future  - POCT ECG  7  Dizziness  - CBC and differential  - Comprehensive metabolic panel  - TSH, 3rd generation  - VAS carotid complete study; Future  - Ambulatory Referral to Cardiology; Future  - POCT ECG          Depression Screening Follow-up Plan: Patient's depression screening was positive with  PHQ-9 score 18  Patient assessed for underlying major depression   They have no active suicidal ideations  Brief counseling provided and recommend additional follow-up/re-evaluation next office visit  Continue regular follow-up with their psychologist/therapist/psychiatrist who is managing their mental health condition(s)  Subjective:      Patient ID: Hebert Yanes is a 60 y o  female who presents for back pain and "other issues"    Low back pain for "years" but worse last few weeks  No new injury  No radiation of pain into legs  Has had back issues for a long time and has "never had any evaluation"  Has not taken any otc meds  Episodes of "passing out"  States "blacks out about every 2-3 weeks"  Has never been witnessed  Does feel dizzy prior to event  No history of seizures  Denies chest pain  History of etoh abuse  Has been drinking "2 big cans' about once a week  Reports last drink was a few weeks ago  (+) odor of alcohol noted and asked pt further who then admitted last drink was last night  Asked pt if the "passing out" occurs after drinking and "dont know"  C/o palpitations and dizziness  Depression- sees psych, [facility] manages  Accompanied by friend, Barbara Britton  The following portions of the patient's history were reviewed and updated as appropriate: allergies, current medications, past family history, past medical history, past social history, past surgical history and problem list     Review of Systems   Constitutional: Positive for fatigue  Negative for unexpected weight change  HENT: Negative for congestion  Eyes: Negative for visual disturbance  Respiratory: Positive for shortness of breath (chronic)  Cardiovascular: Positive for palpitations  Negative for chest pain  Gastrointestinal: Positive for vomiting (intermittent)  Negative for abdominal pain  Endocrine: Negative for cold intolerance, heat intolerance, polydipsia, polyphagia and polyuria  Genitourinary: Negative for frequency and urgency  Musculoskeletal: Positive for back pain     Neurological: Positive for dizziness  Negative for headaches  Psychiatric/Behavioral: Positive for dysphoric mood  Negative for self-injury and suicidal ideas  The patient is nervous/anxious  Objective:  EKG- NSR, no ST elevation, (+) artifact, no ectopy    /80   Pulse 100   Temp (!) 97 2 °F (36 2 °C) (Temporal)   Ht 4' 11 5" (1 511 m)   Wt 57 6 kg (127 lb)   SpO2 98%   BMI 25 22 kg/m²          Physical Exam  Vitals reviewed  Constitutional:       General: She is not in acute distress  Comments: Strong odor of etoh     HENT:      Right Ear: Tympanic membrane normal       Left Ear: Tympanic membrane normal       Mouth/Throat:      Mouth: Mucous membranes are moist       Pharynx: Oropharynx is clear  Eyes:      General: No scleral icterus  Extraocular Movements: Extraocular movements intact  Pupils: Pupils are equal, round, and reactive to light  Neck:      Vascular: No carotid bruit  Cardiovascular:      Rate and Rhythm: Normal rate and regular rhythm  Pulmonary:      Effort: Pulmonary effort is normal  No respiratory distress  Breath sounds: Normal breath sounds  No wheezing or rales  Abdominal:      General: There is no distension  Palpations: Abdomen is soft  Musculoskeletal:         General: No signs of injury  Cervical back: Normal range of motion and neck supple  Right lower leg: No edema  Left lower leg: No edema  Comments: No point vertebral tenderness  Full lumbar and cervical rom  Neg SLR b/l   Skin:     General: Skin is warm and dry  Coloration: Skin is not jaundiced or pale  Neurological:      General: No focal deficit present  Mental Status: She is alert and oriented to person, place, and time  Cranial Nerves: No cranial nerve deficit  Sensory: No sensory deficit  Psychiatric:      Comments: Affect flat  Good eye contact  Answering questions with yes or no answers only  Not disheveled in appearance     Dressed appropriately for weather

## 2022-04-22 LAB
ALBUMIN SERPL-MCNC: 3.4 G/DL (ref 3.8–4.9)
ALBUMIN/GLOB SERPL: 1.5 {RATIO} (ref 1.2–2.2)
ALP SERPL-CCNC: 175 IU/L (ref 44–121)
ALT SERPL-CCNC: 151 IU/L (ref 0–32)
AST SERPL-CCNC: 268 IU/L (ref 0–40)
BASOPHILS # BLD AUTO: 0 X10E3/UL (ref 0–0.2)
BASOPHILS NFR BLD AUTO: 0 %
BILIRUB SERPL-MCNC: 0.5 MG/DL (ref 0–1.2)
BUN SERPL-MCNC: 1 MG/DL (ref 6–24)
BUN/CREAT SERPL: 2 (ref 9–23)
CALCIUM SERPL-MCNC: 9 MG/DL (ref 8.7–10.2)
CHLORIDE SERPL-SCNC: 96 MMOL/L (ref 96–106)
CO2 SERPL-SCNC: 20 MMOL/L (ref 20–29)
CREAT SERPL-MCNC: 0.52 MG/DL (ref 0.57–1)
EGFR: 108 ML/MIN/1.73
EOSINOPHIL # BLD AUTO: 0 X10E3/UL (ref 0–0.4)
EOSINOPHIL NFR BLD AUTO: 0 %
ERYTHROCYTE [DISTWIDTH] IN BLOOD BY AUTOMATED COUNT: 15 % (ref 11.7–15.4)
GLOBULIN SER-MCNC: 2.2 G/DL (ref 1.5–4.5)
GLUCOSE SERPL-MCNC: 99 MG/DL (ref 65–99)
HCT VFR BLD AUTO: 33 % (ref 34–46.6)
HGB BLD-MCNC: 11.6 G/DL (ref 11.1–15.9)
IMM GRANULOCYTES # BLD: 0 X10E3/UL (ref 0–0.1)
IMM GRANULOCYTES NFR BLD: 0 %
LYMPHOCYTES # BLD AUTO: 0.8 X10E3/UL (ref 0.7–3.1)
LYMPHOCYTES NFR BLD AUTO: 15 %
MCH RBC QN AUTO: 31.9 PG (ref 26.6–33)
MCHC RBC AUTO-ENTMCNC: 35.2 G/DL (ref 31.5–35.7)
MCV RBC AUTO: 91 FL (ref 79–97)
MONOCYTES # BLD AUTO: 0.5 X10E3/UL (ref 0.1–0.9)
MONOCYTES NFR BLD AUTO: 10 %
NEUTROPHILS # BLD AUTO: 3.9 X10E3/UL (ref 1.4–7)
NEUTROPHILS NFR BLD AUTO: 75 %
PLATELET # BLD AUTO: 175 X10E3/UL (ref 150–450)
POTASSIUM SERPL-SCNC: 3.3 MMOL/L (ref 3.5–5.2)
PROT SERPL-MCNC: 5.6 G/DL (ref 6–8.5)
RBC # BLD AUTO: 3.64 X10E6/UL (ref 3.77–5.28)
SODIUM SERPL-SCNC: 137 MMOL/L (ref 134–144)
TSH SERPL DL<=0.005 MIU/L-ACNC: 2.35 UIU/ML (ref 0.45–4.5)
WBC # BLD AUTO: 5.2 X10E3/UL (ref 3.4–10.8)

## 2022-04-28 ENCOUNTER — TELEPHONE (OUTPATIENT)
Dept: FAMILY MEDICINE CLINIC | Facility: CLINIC | Age: 57
End: 2022-04-28

## 2022-04-28 NOTE — TELEPHONE ENCOUNTER
LMOM for patient's daughter, Halima Ojeda at [phone]  I requested that Crystal have Valentín Cartagena call the office to schedule an appointment as she missed the appointment today without calling / cancelling within 24 hours  Need to ask Valentín Cartagena if she made appointments for her specialist as Jannie Allen  had directed  Brodie Prado accordingly  (Sent letter to patient  - missed appointment without calling / cancelling with 24 hours      Laycharleen Julien

## 2022-04-28 NOTE — TELEPHONE ENCOUNTER
Patient phone is not working  Advised patient that they were a no show for their appointment at [facility]  (First occurrence)  Reminded patient that they are required to advise the office at least 24 hours in advance if they need to cancel and/or reschedule their appointment    Patricia Basurto

## 2022-05-05 ENCOUNTER — TELEPHONE (OUTPATIENT)
Dept: PHYSICAL THERAPY | Facility: CLINIC | Age: 57
End: 2022-05-05

## 2022-05-05 NOTE — TELEPHONE ENCOUNTER
Patient did not arrive for 3:30 initial evaluation for physical therapy  Patient called to follow-up by therapist, with patient reporting she tried to text back (assuming to appointment reminder) to let us know that she did not have transportation and did not feel well today  Coughing noted while on phone with patient  Patient made aware of next appointment on Wednesday at 4:15 pm, which she states she will be able to attend  Requested that she call to cancel 24 hours in advance if she is unable to make that appointment       Ana Maria Santana, PT, MS, NCS  ABPTS Neurologic Certified Specialist  [place] Licence #[id number]

## 2022-05-11 ENCOUNTER — TELEPHONE (OUTPATIENT)
Dept: PHYSICAL THERAPY | Facility: CLINIC | Age: 57
End: 2022-05-11

## 2022-05-11 NOTE — TELEPHONE ENCOUNTER
Patient called and spoke with Mei Olvera to cancel tonight's appointment, due to her ride being sick and unable to drive her  Will call back as soon as possible to confirm if she will be able to make tomorrow's appointment       Laura Best, PT, MS, NCS  ABPTS Neurologic Certified Specialist  [place] Licence #[id number]

## 2022-05-13 ENCOUNTER — TELEPHONE (OUTPATIENT)
Dept: PHYSICAL THERAPY | Facility: CLINIC | Age: 57
End: 2022-05-13

## 2022-05-13 NOTE — TELEPHONE ENCOUNTER
Pt was called yesterday prior to her appt to remind/confirm it   Pt stated she wasn't feeling well and was not coming  (this was her 3rd attempt at an evaluation)

## 2022-05-17 ENCOUNTER — OFFICE VISIT (OUTPATIENT)
Dept: FAMILY MEDICINE CLINIC | Facility: CLINIC | Age: 57
End: 2022-05-17
Payer: COMMERCIAL

## 2022-05-17 VITALS
SYSTOLIC BLOOD PRESSURE: 152 MMHG | BODY MASS INDEX: 24.94 KG/M2 | HEIGHT: 60 IN | TEMPERATURE: 97.6 F | OXYGEN SATURATION: 97 % | WEIGHT: 127 LBS | DIASTOLIC BLOOD PRESSURE: 92 MMHG | HEART RATE: 94 BPM

## 2022-05-17 DIAGNOSIS — E87.6 HYPOKALEMIA: ICD-10-CM

## 2022-05-17 DIAGNOSIS — F10.10 ETOH ABUSE: ICD-10-CM

## 2022-05-17 DIAGNOSIS — R79.89 ELEVATED LFTS: ICD-10-CM

## 2022-05-17 DIAGNOSIS — Z12.31 ENCOUNTER FOR SCREENING MAMMOGRAM FOR MALIGNANT NEOPLASM OF BREAST: ICD-10-CM

## 2022-05-17 DIAGNOSIS — I10 ESSENTIAL HYPERTENSION: Primary | ICD-10-CM

## 2022-05-17 DIAGNOSIS — Z74.8 ASSISTANCE NEEDED WITH TRANSPORTATION: ICD-10-CM

## 2022-05-17 PROCEDURE — 3077F SYST BP >= 140 MM HG: CPT | Performed by: NURSE PRACTITIONER

## 2022-05-17 PROCEDURE — 99214 OFFICE O/P EST MOD 30 MIN: CPT | Performed by: NURSE PRACTITIONER

## 2022-05-17 PROCEDURE — 3080F DIAST BP >= 90 MM HG: CPT | Performed by: NURSE PRACTITIONER

## 2022-05-17 NOTE — PATIENT INSTRUCTIONS
Excessive alcohol intake is detrimental to your health  As discussed, your lab values indicate significant issues with your liver which are directly connected to your alcohol use  It is imperative that you stop drinking  Keep the appts that are scheduled for cardiology and orthopedics  Continue current medications  Your blood pressure is elevated which may also be due to your alcohol use  I will be referring you to the /care manager who may be able to assist with transportation to appts

## 2022-05-17 NOTE — PROGRESS NOTES
Assessment/Plan:    1  Essential hypertension  Continue taking Lisinopril as prescribed  Discussed the effects of alcohol use on current high blood pressure and urged to discontinue alcohol consumption  2  ETOH abuse  Discussed at length with patient the detrimental effects of alcohol on her health  Educated patient on the importance of discontinuing drinking/alcohol consumption  Patient states she is trying to quit and limit purchase of alcohol so she does not drink  Denies wanting to attend AA meetings at this time  3  Elevated LFTs  Discussed blood work with patient and very high LFTs  Educated patient on the effects her increased alcohol consumption is having on her health  Urged the patient to discontinue alcohol use  4  Hypokalemia  Discussed blood work with patient  Continue taking medications as prescribed  Educated on foods higher in potassium and how to incorporate into daily diet  Urged patient to discontinue alcohol use  5  Encounter for screening mammogram for malignant neoplasm of breast  Will order mammogram for patient to schedule to be completed  Will follow up on results of testing with patient  - Mammo screening bilateral w 3d & cad; Future    6  Assistance needed with transportation  Will refer to /case management services who can assist patient with getting to and keeping scheduled appointments  Patient agreeable  - Ambulatory Referral to Complex Care Management Program; Future        Subjective:      Patient ID: Man Bender is a 60 y o  female who presents to review labs and follow up    Here to follow up  Seen in office on 4/21  At that time, reported frequent syncopal episodes  Pt did admit to significant etoh use  Does have etoh abuse history  Also with c/o dizziness, palpitations, and back pain  Will be seeing cardio and ortho coming up     Needs appointment with PT    Patient still having frequent syncopal episodes, once weekly  Denies dizziness or palpitations  Still c/o back pain    Patient admits to drinking once weekly, about three cans of beer  States syncopal episodes are not on days she drinks  States she is trying to "quit alcohol altogether"  Last drink last night        The following portions of the patient's history were reviewed and updated as appropriate: allergies, current medications, past family history, past medical history, past social history, past surgical history and problem list     Review of Systems   Constitutional: Negative for activity change, appetite change, chills and fever  HENT: Positive for rhinorrhea (seasonal allergies)  Negative for congestion, sinus pressure and sinus pain  Respiratory: Positive for cough (seasonal allergies, chronic smoker)  Cardiovascular: Negative for chest pain and palpitations  Gastrointestinal: Positive for diarrhea (chronic)  Negative for abdominal pain, constipation, nausea and vomiting  Endocrine: Negative  Genitourinary: Negative for difficulty urinating, flank pain, pelvic pain and urgency  Musculoskeletal: Positive for back pain (lower bilaterally)  Negative for joint swelling and neck pain  Skin: Negative  Neurological: Positive for syncope ("get up to walk somewhere and wake up on floor")  Negative for dizziness, light-headedness and numbness  Hematological: Negative  Psychiatric/Behavioral: Negative            Objective:  Recent Results (from the past 672 hour(s))   CBC and differential    Collection Time: 04/21/22  2:55 PM   Result Value Ref Range    White Blood Cell Count 5 2 3 4 - 10 8 x10E3/uL    Red Blood Cell Count 3 64 (L) 3 77 - 5 28 x10E6/uL    Hemoglobin 11 6 11 1 - 15 9 g/dL    HCT 33 0 (L) 34 0 - 46 6 %    MCV 91 79 - 97 fL    MCH 31 9 26 6 - 33 0 pg    MCHC 35 2 31 5 - 35 7 g/dL    RDW 15 0 11 7 - 15 4 %    Platelet Count 562 170 - 450 x10E3/uL    Neutrophils 75 Not Estab  %    Lymphocytes 15 Not Estab  %    Monocytes 10 Not Estab  %    Eosinophils 0 Not Estab  %    Basophils PCT 0 Not Estab  %    Neutrophils (Absolute) 3 9 1 4 - 7 0 x10E3/uL    Lymphocytes (Absolute) 0 8 0 7 - 3 1 x10E3/uL    Monocytes (Absolute) 0 5 0 1 - 0 9 x10E3/uL    Eosinophils (Absolute) 0 0 0 0 - 0 4 x10E3/uL    Basophils ABS 0 0 0 0 - 0 2 x10E3/uL    Immature Granulocytes 0 Not Estab  %    Immature Granulocytes (Absolute) 0 0 0 0 - 0 1 x10E3/uL   Comprehensive metabolic panel    Collection Time: 04/21/22  2:55 PM   Result Value Ref Range    Glucose, Random 99 65 - 99 mg/dL    BUN 1 (LL) 6 - 24 mg/dL    Creatinine 0 52 (L) 0 57 - 1 00 mg/dL    eGFR 108 >59 mL/min/1 73    SL AMB BUN/CREATININE RATIO 2 (L) 9 - 23    Sodium 137 134 - 144 mmol/L    Potassium 3 3 (L) 3 5 - 5 2 mmol/L    Chloride 96 96 - 106 mmol/L    CO2 20 20 - 29 mmol/L    CALCIUM 9 0 8 7 - 10 2 mg/dL    Protein, Total 5 6 (L) 6 0 - 8 5 g/dL    Albumin 3 4 (L) 3 8 - 4 9 g/dL    Globulin, Total 2 2 1 5 - 4 5 g/dL    Albumin/Globulin Ratio 1 5 1 2 - 2 2    TOTAL BILIRUBIN 0 5 0 0 - 1 2 mg/dL    Alk Phos Isoenzymes 175 (H) 44 - 121 IU/L     (H) 0 - 40 IU/L     (H) 0 - 32 IU/L   TSH, 3rd generation    Collection Time: 04/21/22  2:55 PM   Result Value Ref Range    TSH 2 350 0 450 - 4 500 uIU/mL     Reviewed lab/diagnostic results with pt including both normal and abnormal findings  In depth counseling and instructions given  All questions answered during visit  /92   Pulse 94   Temp 97 6 °F (36 4 °C) (Temporal)   Ht 5' (1 524 m)   Wt 57 6 kg (127 lb)   SpO2 97%   BMI 24 80 kg/m²          Physical Exam  Constitutional:       General: She is not in acute distress  Appearance: She is ill-appearing  HENT:      Head: Normocephalic and atraumatic  Right Ear: Tympanic membrane normal  There is no impacted cerumen  Left Ear: Tympanic membrane normal  There is no impacted cerumen  Nose: Nose normal  No congestion or rhinorrhea        Mouth/Throat:      Mouth: Mucous membranes are moist  Pharynx: Oropharynx is clear  No oropharyngeal exudate or posterior oropharyngeal erythema  Eyes:      General:         Right eye: No discharge  Left eye: No discharge  Conjunctiva/sclera: Conjunctivae normal       Pupils: Pupils are equal, round, and reactive to light  Cardiovascular:      Rate and Rhythm: Normal rate and regular rhythm  Pulses: Normal pulses  Heart sounds: Normal heart sounds  No murmur heard  No gallop  Pulmonary:      Effort: Pulmonary effort is normal  No respiratory distress  Breath sounds: Examination of the right-middle field reveals wheezing  Examination of the left-middle field reveals wheezing  Wheezing (chronic smoker) present  No rales  Comments: Frequent cough on exam  Chronic smoker  Abdominal:      General: Abdomen is flat  Bowel sounds are normal  There is no distension  Palpations: Abdomen is soft  Tenderness: There is no abdominal tenderness  There is no guarding  Musculoskeletal:         General: No swelling or deformity  Normal range of motion  Cervical back: Normal range of motion and neck supple  No rigidity  Lumbar back: Tenderness present  No swelling, edema or signs of trauma  Right lower leg: No edema  Left lower leg: No edema  Lymphadenopathy:      Cervical: No cervical adenopathy  Skin:     General: Skin is warm and dry  Capillary Refill: Capillary refill takes 2 to 3 seconds  Coloration: Skin is not jaundiced or pale  Neurological:      General: No focal deficit present  Mental Status: She is alert and oriented to person, place, and time  Motor: No weakness  Gait: Gait normal    Psychiatric:         Mood and Affect: Mood normal          Behavior: Behavior normal  Behavior is cooperative  Thought Content:  Thought content normal          Judgment: Judgment normal

## 2022-05-18 DIAGNOSIS — E55.9 VITAMIN D DEFICIENCY: ICD-10-CM

## 2022-05-18 RX ORDER — ERGOCALCIFEROL 1.25 MG/1
50000 CAPSULE ORAL WEEKLY
Qty: 4 CAPSULE | Refills: 3 | Status: SHIPPED | OUTPATIENT
Start: 2022-05-18

## 2022-05-18 NOTE — TELEPHONE ENCOUNTER
Requested medication(s) are due for refill today: Yes  Patient has already received a courtesy refill: No  Other reason request has been forwarded to provider: Endocrinology:  Vitamins - Vitamin D Supplementation Failed

## 2022-05-19 ENCOUNTER — OFFICE VISIT (OUTPATIENT)
Dept: OBGYN CLINIC | Facility: CLINIC | Age: 57
End: 2022-05-19
Payer: COMMERCIAL

## 2022-05-19 ENCOUNTER — APPOINTMENT (OUTPATIENT)
Dept: RADIOLOGY | Facility: CLINIC | Age: 57
End: 2022-05-19
Payer: COMMERCIAL

## 2022-05-19 VITALS
HEART RATE: 85 BPM | HEIGHT: 60 IN | WEIGHT: 128 LBS | DIASTOLIC BLOOD PRESSURE: 70 MMHG | RESPIRATION RATE: 19 BRPM | SYSTOLIC BLOOD PRESSURE: 110 MMHG | TEMPERATURE: 98.2 F | BODY MASS INDEX: 25.13 KG/M2

## 2022-05-19 DIAGNOSIS — M54.50 CHRONIC MIDLINE LOW BACK PAIN WITHOUT SCIATICA: Primary | ICD-10-CM

## 2022-05-19 DIAGNOSIS — G89.29 CHRONIC MIDLINE LOW BACK PAIN WITHOUT SCIATICA: Primary | ICD-10-CM

## 2022-05-19 DIAGNOSIS — M54.50 LOW BACK PAIN WITHOUT SCIATICA, UNSPECIFIED BACK PAIN LATERALITY, UNSPECIFIED CHRONICITY: ICD-10-CM

## 2022-05-19 PROCEDURE — 72100 X-RAY EXAM L-S SPINE 2/3 VWS: CPT

## 2022-05-19 PROCEDURE — 3008F BODY MASS INDEX DOCD: CPT | Performed by: ORTHOPAEDIC SURGERY

## 2022-05-19 PROCEDURE — 99204 OFFICE O/P NEW MOD 45 MIN: CPT | Performed by: ORTHOPAEDIC SURGERY

## 2022-05-19 PROCEDURE — 4004F PT TOBACCO SCREEN RCVD TLK: CPT | Performed by: ORTHOPAEDIC SURGERY

## 2022-05-19 NOTE — PROGRESS NOTES
Assessment/Plan:  1  Chronic midline low back pain without sciatica  XR spine lumbar 2 or 3 views injury    Ambulatory referral to Physical Therapy       Nandini Mattson has chronic low back pain which seems muscular in nature  She may have an element of mild scoliosis  I recommended formal physical therapy or home exercises at this time  She states that physically difficult for her to get to therapy due to lack of transportation  We will try home exercises and continue anti-inflammatory medications for now  Follow-up after therapy is complete if pain continues  Subjective: Devonte Murphy is a 60 y o  female who presents to the office for evaluation for ongoing chronic low back pain  She denies any injury or trauma  She has been having discomfort in her low back for many years  Seems to be worsening recently and bothers her when she is physically active  Bending forward or sweeping with a broom can give her increased pain  She denies any numbness or tingling or radiating pain down her legs  She has been taking over-the-counter anti-inflammatory medications which do not significantly help  Review of Systems   Constitutional: Negative for chills, fever and unexpected weight change  HENT: Negative for hearing loss, nosebleeds and sore throat  Eyes: Negative for pain, redness and visual disturbance  Respiratory: Negative for cough, shortness of breath and wheezing  Cardiovascular: Negative for chest pain, palpitations and leg swelling  Gastrointestinal: Negative for abdominal pain, nausea and vomiting  Endocrine: Negative for polydipsia and polyuria  Genitourinary: Negative for dysuria and hematuria  Musculoskeletal:        See HPI   Skin: Negative for rash and wound  Neurological: Negative for dizziness, numbness and headaches  Psychiatric/Behavioral: Negative for decreased concentration and suicidal ideas  The patient is not nervous/anxious            Past Medical History:   Diagnosis Date  Anxiety     [facility]     COPD exacerbation ([facility] [address] ) 8/12/2016    Dental abscess 10/2020    Depression     ETOH abuse     Facial abscess 8/12/2016       History reviewed  No pertinent surgical history      Family History   Problem Relation Age of Onset    Brain cancer Maternal Aunt     Cancer Other     Substance Abuse Neg Hx     Mental illness Neg Hx        Social History     Occupational History    Not on file   Tobacco Use    Smoking status: Current Every Day Smoker     Packs/day: 1 00     Types: Cigarettes    Smokeless tobacco: Never Used    Tobacco comment: smokes 1 pack in 3 days   Vaping Use    Vaping Use: Never used   Substance and Sexual Activity    Alcohol use: Yes     Comment: states drank today had some beer a couple hours ago    Drug use: No    Sexual activity: Not on file         Current Outpatient Medications:     albuterol (PROVENTIL HFA,VENTOLIN HFA) 90 mcg/act inhaler, Inhale 2 puffs every 6 (six) hours as needed for wheezing for up to 14 doses, Disp: 6 7 g, Rfl: 0    ergocalciferol (VITAMIN D2) 50,000 units, TAKE 1 CAPSULE (50,000 UNITS TOTAL) BY MOUTH ONCE A WEEK, Disp: 4 capsule, Rfl: 3    FLUoxetine (PROzac) 20 mg capsule, Take 20 mg by mouth 3 (three) times a day, Disp: , Rfl:     lisinopril (ZESTRIL) 20 mg tablet, Take 1 tablet (20 mg total) by mouth daily, Disp: 30 tablet, Rfl: 5    mirtazapine (REMERON) 15 mg tablet, Take 15 mg by mouth daily, Disp: , Rfl:     QUEtiapine (SEROquel) 100 mg tablet, Take 200 mg by mouth daily at bedtime , Disp: , Rfl:     rOPINIRole (REQUIP) 0 5 mg tablet, Take 0 5 mg by mouth daily at bedtime, Disp: , Rfl:     rosuvastatin (CRESTOR) 20 MG tablet, Take 1 tablet (20 mg total) by mouth daily, Disp: 30 tablet, Rfl: 5    Allergies   Allergen Reactions    Bee Venom        Objective:  Vitals:    05/19/22 1311   BP: 110/70   Pulse: 85   Resp: 19   Temp: 98 2 °F (36 8 °C)       Back Exam     Tenderness Back tenderness location: Mild tenderness to palpation over bilateral lumbar paraspinal region  Range of Motion   Extension: normal   Flexion: normal     Muscle Strength   Right Quadriceps:  5/5   Left Quadriceps:  5/5   Right Hamstrings:  5/5   Left Hamstrings:  5/5     Tests   Straight leg raise right: negative  Straight leg raise left: negative    Other   Sensation: normal  Gait: normal   Erythema: no back redness            Physical Exam  Vitals and nursing note reviewed  Constitutional:       Appearance: She is well-developed  HENT:      Head: Normocephalic and atraumatic  Eyes:      General: No scleral icterus  Conjunctiva/sclera: Conjunctivae normal    Cardiovascular:      Rate and Rhythm: Normal rate  Pulmonary:      Effort: Pulmonary effort is normal  No respiratory distress  Musculoskeletal:      Cervical back: Normal range of motion and neck supple  Lumbar back: Negative right straight leg raise test and negative left straight leg raise test       Comments: As noted in HPI   Skin:     General: Skin is warm and dry  Neurological:      Mental Status: She is alert and oriented to person, place, and time  Psychiatric:         Behavior: Behavior normal          I have personally reviewed pertinent films in PACS and my interpretation is as follows:  X-rays of the lumbar spine demonstrates slight lumbar scoliosis with convexity to the left  Mild degenerative changes    No fracture

## 2022-05-20 DIAGNOSIS — Z74.8 ASSISTANCE NEEDED WITH TRANSPORTATION: Primary | ICD-10-CM

## 2022-05-20 NOTE — PROGRESS NOTES
Referral received for complex case management for transportation issues. Referral placed for [facility] outreach.

## 2022-05-23 ENCOUNTER — PATIENT OUTREACH (OUTPATIENT)
Dept: FAMILY MEDICINE CLINIC | Facility: CLINIC | Age: 57
End: 2022-05-23

## 2022-05-23 NOTE — PROGRESS NOTES
SW received referral to outreach pt to assist with providing transportation resources  SW called and spoke with pt, explained role in care management  Pt confirmed she does need assistance with transportation, as her friend is not always available to transport her  SW informed pt that because she has [address], she can use  [employer]: [phone]   Pt took down the number  SW informed pt that if she does not utilitze these, her other options are [employer] (public) transport of [place]  Pt did not have any other concerns in areas of social needs, including financial or behavioral health  SW informed pt if she does, moving forward, she may request assistance from   Pt appreciative of the call  Note routed to Lakeisha Wilson

## 2022-06-17 DIAGNOSIS — E78.2 MIXED HYPERLIPIDEMIA: ICD-10-CM

## 2022-06-17 DIAGNOSIS — I10 ESSENTIAL HYPERTENSION: ICD-10-CM

## 2022-06-17 RX ORDER — LISINOPRIL 20 MG/1
20 TABLET ORAL DAILY
Qty: 30 TABLET | Refills: 4 | Status: SHIPPED | OUTPATIENT
Start: 2022-06-17

## 2022-06-17 RX ORDER — ROSUVASTATIN CALCIUM 20 MG/1
20 TABLET, COATED ORAL DAILY
Qty: 30 TABLET | Refills: 4 | Status: SHIPPED | OUTPATIENT
Start: 2022-06-17

## 2022-06-20 ENCOUNTER — TELEPHONE (OUTPATIENT)
Dept: PHYSICAL THERAPY | Facility: CLINIC | Age: 57
End: 2022-06-20

## 2022-06-22 ENCOUNTER — TELEPHONE (OUTPATIENT)
Dept: PHYSICAL THERAPY | Facility: CLINIC | Age: 57
End: 2022-06-22

## 2022-06-22 NOTE — TELEPHONE ENCOUNTER
Patient called stating that she could not make today's session because her ride did not show up  Patient advised because she has had to reschedule/cancel multiple appointments same day it is most appropriate to call for same day evaluation  Patient agreeable

## 2022-09-12 ENCOUNTER — TELEPHONE (OUTPATIENT)
Dept: FAMILY MEDICINE CLINIC | Facility: CLINIC | Age: 57
End: 2022-09-12

## 2022-09-13 ENCOUNTER — TELEPHONE (OUTPATIENT)
Dept: FAMILY MEDICINE CLINIC | Facility: CLINIC | Age: 57
End: 2022-09-13

## 2022-09-13 NOTE — TELEPHONE ENCOUNTER
Second message left for patient to please call the office regarding her appt on 9/13    [facility]

## 2022-09-28 ENCOUNTER — OFFICE VISIT (OUTPATIENT)
Dept: OBGYN CLINIC | Facility: CLINIC | Age: 57
End: 2022-09-28
Payer: COMMERCIAL

## 2022-09-28 VITALS
WEIGHT: 128 LBS | DIASTOLIC BLOOD PRESSURE: 84 MMHG | SYSTOLIC BLOOD PRESSURE: 119 MMHG | BODY MASS INDEX: 25.13 KG/M2 | HEIGHT: 60 IN | HEART RATE: 86 BPM

## 2022-09-28 DIAGNOSIS — M54.50 CHRONIC MIDLINE LOW BACK PAIN WITHOUT SCIATICA: Primary | ICD-10-CM

## 2022-09-28 DIAGNOSIS — G89.29 CHRONIC MIDLINE LOW BACK PAIN WITHOUT SCIATICA: Primary | ICD-10-CM

## 2022-09-28 PROCEDURE — 99213 OFFICE O/P EST LOW 20 MIN: CPT | Performed by: ORTHOPAEDIC SURGERY

## 2022-09-28 NOTE — PROGRESS NOTES
Assessment/Plan:  1  Chronic midline low back pain without sciatica  MRI lumbar spine wo contrast    Ambulatory referral to Pain Management       Alanis Soriano has ongoing low back pain and has failed conservative measures of physician directed home exercise program   She has significant transportation issues and cannot get to formal physical therapy  I did offer this again for her today and she declined  I have ordered an MRI of the lumbar spine for her as well as a referral to pain management  She will follow-up with pain management following the MRI and discuss treatment options at that time  There is little I can offer her from sports medicine perspective any further  Subjective: Doretha Fuentes is a 60 y o  female who presents to the office for follow-up for low back pain  She was last in the office 4 months ago with ongoing low back pain that was aching and throbbing cross her low back and he had x-rays demonstrating degenerative changes and scoliosis  She was given a physician directed home exercise program at that time and she states she has been compliant with the home exercises for the last 3 months and reports no significant improvement in her back pain  She feels like the pain is getting worse  She denies any numbness or tingling radiating pain down her legs  Review of Systems   Constitutional: Negative for chills, fever and unexpected weight change  HENT: Negative for hearing loss, nosebleeds and sore throat  Eyes: Negative for pain, redness and visual disturbance  Respiratory: Negative for cough, shortness of breath and wheezing  Cardiovascular: Negative for chest pain, palpitations and leg swelling  Gastrointestinal: Negative for abdominal pain, nausea and vomiting  Endocrine: Negative for polydipsia and polyuria  Genitourinary: Negative for dysuria and hematuria  Musculoskeletal:        See HPI   Skin: Negative for rash and wound     Neurological: Negative for dizziness, numbness and headaches  Psychiatric/Behavioral: Negative for decreased concentration and suicidal ideas  The patient is not nervous/anxious  Past Medical History:   Diagnosis Date    Anxiety     [facility]) [facility]     COPD exacerbation ([facility] ) 8/12/2016    Dental abscess 10/2020    Depression     ETOH abuse     Facial abscess 8/12/2016       No past surgical history on file      Family History   Problem Relation Age of Onset    Brain cancer Maternal Aunt     Cancer Other     Substance Abuse Neg Hx     Mental illness Neg Hx        Social History     Occupational History    Not on file   Tobacco Use    Smoking status: Current Every Day Smoker     Packs/day: 1 00     Types: Cigarettes    Smokeless tobacco: Never Used    Tobacco comment: smokes 1 pack in 3 days   Vaping Use    Vaping Use: Never used   Substance and Sexual Activity    Alcohol use: Yes     Comment: states drank today had some beer a couple hours ago    Drug use: No    Sexual activity: Not on file         Current Outpatient Medications:     albuterol (PROVENTIL HFA,VENTOLIN HFA) 90 mcg/act inhaler, Inhale 2 puffs every 6 (six) hours as needed for wheezing for up to 14 doses, Disp: 6 7 g, Rfl: 0    ergocalciferol (VITAMIN D2) 50,000 units, TAKE 1 CAPSULE (50,000 UNITS TOTAL) BY MOUTH ONCE A WEEK, Disp: 4 capsule, Rfl: 3    FLUoxetine (PROzac) 20 mg capsule, Take 20 mg by mouth 3 (three) times a day, Disp: , Rfl:     lisinopril (ZESTRIL) 20 mg tablet, TAKE 1 TABLET (20 MG TOTAL) BY MOUTH DAILY, Disp: 30 tablet, Rfl: 4    mirtazapine (REMERON) 15 mg tablet, Take 15 mg by mouth daily, Disp: , Rfl:     QUEtiapine (SEROquel) 100 mg tablet, Take 200 mg by mouth daily at bedtime , Disp: , Rfl:     rOPINIRole (REQUIP) 0 5 mg tablet, Take 0 5 mg by mouth daily at bedtime, Disp: , Rfl:     rosuvastatin (CRESTOR) 20 MG tablet, TAKE 1 TABLET (20 MG TOTAL) BY MOUTH DAILY, Disp: 30 tablet, Rfl: 4    Allergies   Allergen Reactions    Bee Venom        Objective:  Vitals:    09/28/22 1256   BP: 119/84   Pulse: 86       Back Exam     Tenderness   The patient is experiencing tenderness in the lumbar  Range of Motion   Extension: normal   Flexion: normal     Muscle Strength   Right Quadriceps:  5/5   Left Quadriceps:  5/5   Right Hamstrings:  5/5   Left Hamstrings:  5/5     Tests   Straight leg raise right: negative  Straight leg raise left: negative    Other   Sensation: normal  Gait: normal             Physical Exam  Vitals and nursing note reviewed  Constitutional:       Appearance: She is well-developed  HENT:      Head: Normocephalic and atraumatic  Eyes:      General: No scleral icterus  Extraocular Movements: Extraocular movements intact  Conjunctiva/sclera: Conjunctivae normal    Cardiovascular:      Rate and Rhythm: Normal rate  Pulmonary:      Effort: Pulmonary effort is normal  No respiratory distress  Musculoskeletal:      Cervical back: Normal range of motion and neck supple  Lumbar back: Negative right straight leg raise test and negative left straight leg raise test       Comments: As noted in HPI   Skin:     General: Skin is warm and dry  Neurological:      Mental Status: She is alert and oriented to person, place, and time  Psychiatric:         Behavior: Behavior normal                This document was created using speech voice recognition software  Grammatical errors, random word insertions, pronoun errors, and incomplete sentences are an occasional consequence of this system due to software limitations, ambient noise, and hardware issues  Any formal questions or concerns about content, text, or information contained within the body of this dictation should be directly addressed to the provider for clarification

## 2022-11-18 ENCOUNTER — HOSPITAL ENCOUNTER (OUTPATIENT)
Dept: RADIOLOGY | Facility: HOSPITAL | Age: 57
Discharge: HOME | End: 2022-11-18
Attending: ORTHOPAEDIC SURGERY

## 2022-11-18 DIAGNOSIS — M54.50 CHRONIC MIDLINE LOW BACK PAIN WITHOUT SCIATICA: ICD-10-CM

## 2022-11-18 DIAGNOSIS — G89.29 CHRONIC MIDLINE LOW BACK PAIN WITHOUT SCIATICA: ICD-10-CM

## 2022-12-01 ENCOUNTER — HOSPITAL ENCOUNTER (EMERGENCY)
Facility: HOSPITAL | Age: 57
Discharge: HOME | End: 2022-12-01
Attending: EMERGENCY MEDICINE

## 2022-12-01 VITALS
RESPIRATION RATE: 18 BRPM | TEMPERATURE: 97.9 F | WEIGHT: 125 LBS | HEART RATE: 80 BPM | BODY MASS INDEX: 24.41 KG/M2 | SYSTOLIC BLOOD PRESSURE: 161 MMHG | DIASTOLIC BLOOD PRESSURE: 74 MMHG | OXYGEN SATURATION: 97 %

## 2022-12-01 DIAGNOSIS — F32.9 MAJOR DEPRESSIVE DISORDER, REMISSION STATUS UNSPECIFIED, UNSPECIFIED WHETHER RECURRENT: ICD-10-CM

## 2022-12-01 DIAGNOSIS — Z76.0 ENCOUNTER FOR MEDICATION REFILL: Primary | ICD-10-CM

## 2022-12-01 RX ORDER — ROPINIROLE 0.5 MG/1
0.5 TABLET, FILM COATED ORAL
Qty: 30 TABLET | Refills: 0 | Status: SHIPPED | OUTPATIENT
Start: 2022-12-01 | End: 2022-12-30 | Stop reason: SDUPTHER

## 2022-12-01 RX ORDER — FLUOXETINE HYDROCHLORIDE 20 MG/1
20 CAPSULE ORAL DAILY
Qty: 30 CAPSULE | Refills: 0 | Status: SHIPPED | OUTPATIENT
Start: 2022-12-01

## 2022-12-01 RX ORDER — OLANZAPINE 10 MG/1
10 TABLET ORAL
COMMUNITY
End: 2022-12-01 | Stop reason: ALTCHOICE

## 2022-12-01 RX ORDER — QUETIAPINE FUMARATE 100 MG/1
200 TABLET, FILM COATED ORAL
Qty: 60 TABLET | Refills: 0 | Status: SHIPPED | OUTPATIENT
Start: 2022-12-01 | End: 2022-12-30 | Stop reason: SDUPTHER

## 2022-12-01 RX ORDER — OLANZAPINE 10 MG/1
10 TABLET ORAL
COMMUNITY
End: 2022-12-01 | Stop reason: SDUPTHER

## 2022-12-01 RX ORDER — OLANZAPINE 10 MG/1
10 TABLET ORAL EVERY MORNING
Qty: 30 TABLET | Refills: 0 | Status: SHIPPED | OUTPATIENT
Start: 2022-12-01 | End: 2022-12-30 | Stop reason: SDUPTHER

## 2022-12-01 NOTE — ED PROVIDER NOTES
History  Chief Complaint   Patient presents with   • Medication Refill     Needs refills for olanzapine, seroquel,prozac, requip  Also woke up 2 days ago with redness and swelling around her right eye     57-year-old female, presenting for refill of her psychiatric medications  Patient states she is trying to get appointment with psychiatrist and is currently on a waiting list, needs refills of her medications  Patient states she has refills available for her blood pressure and cholesterol medication  Patient denies any acute depression, suicidal ideation, or hallucinations  Patient reports bruising around right eye noticed waking up 2 days ago  States she has a history of sleepwalking and getting injured while sleepwalking  Denies any facial pain or headache, no visual changes  History provided by:  Patient   used: No        Prior to Admission Medications   Prescriptions Last Dose Informant Patient Reported? Taking?    FLUoxetine (PROzac) 20 mg capsule 12/1/2022  Yes Yes   Sig: Take 20 mg by mouth 3 (three) times a day   FLUoxetine (PROzac) 20 mg capsule   No Yes   Sig: Take 1 capsule (20 mg total) by mouth daily   OLANZapine (ZyPREXA) 10 mg tablet 12/1/2022  Yes Yes   Sig: Take 10 mg by mouth daily at bedtime   OLANZapine (ZyPREXA) 10 mg tablet   No Yes   Sig: Take 1 tablet (10 mg total) by mouth every morning   QUEtiapine (SEROquel) 100 mg tablet 11/30/2022  Yes Yes   Sig: Take 200 mg by mouth daily at bedtime    QUEtiapine (SEROquel) 100 mg tablet   No Yes   Sig: Take 2 tablets (200 mg total) by mouth daily at bedtime   albuterol (PROVENTIL HFA,VENTOLIN HFA) 90 mcg/act inhaler 12/1/2022  No Yes   Sig: Inhale 2 puffs every 6 (six) hours as needed for wheezing for up to 14 doses   ergocalciferol (VITAMIN D2) 50,000 units Past Week  No Yes   Sig: TAKE 1 CAPSULE (50,000 UNITS TOTAL) BY MOUTH ONCE A WEEK   lisinopril (ZESTRIL) 20 mg tablet 12/1/2022  No Yes   Sig: TAKE 1 TABLET (20 MG TOTAL) BY MOUTH DAILY   rOPINIRole (REQUIP) 0 5 mg tablet 11/30/2022  Yes Yes   Sig: Take 0 5 mg by mouth daily at bedtime   rOPINIRole (REQUIP) 0 5 mg tablet   No Yes   Sig: Take 1 tablet (0 5 mg total) by mouth daily at bedtime   rosuvastatin (CRESTOR) 20 MG tablet 11/30/2022  No Yes   Sig: TAKE 1 TABLET (20 MG TOTAL) BY MOUTH DAILY      Facility-Administered Medications: None       Past Medical History:   Diagnosis Date   • Anxiety    • COPD (chronic obstructive pulmonary disease) ([facility]    • COPD exacerbation ([facility] ) 8/12/2016   • Dental abscess 10/2020   • Depression    • ETOH abuse    • Facial abscess 8/12/2016       History reviewed  No pertinent surgical history  Family History   Problem Relation Age of Onset   • Brain cancer Maternal Aunt    • Cancer Other    • Substance Abuse Neg Hx    • Mental illness Neg Hx      I have reviewed and agree with the history as documented  E-Cigarette/Vaping   • E-Cigarette Use Never User      E-Cigarette/Vaping Substances   • Nicotine No    • THC No    • CBD No    • Flavoring No    • Other No    • Unknown No      Social History     Tobacco Use   • Smoking status: Every Day     Packs/day: 1 00     Types: Cigarettes   • Smokeless tobacco: Never   • Tobacco comments:     smokes 1 pack in 3 days   Vaping Use   • Vaping Use: Never used   Substance Use Topics   • Alcohol use: Yes     Comment: states drank today had some beer a couple hours ago   • Drug use: No       Review of Systems   Constitutional: Negative  Eyes: Negative  Negative for pain and visual disturbance  Respiratory: Negative  Cardiovascular: Negative  Neurological: Negative  Negative for headaches  Physical Exam  Physical Exam  Vitals and nursing note reviewed  Constitutional:       General: She is not in acute distress  HENT:      Head:      Comments: Resolving ecchymosis to right periorbital face  No tenderness  Eyes:      Extraocular Movements: Extraocular movements intact  Conjunctiva/sclera: Conjunctivae normal       Pupils: Pupils are equal, round, and reactive to light  Cardiovascular:      Rate and Rhythm: Normal rate  Pulmonary:      Effort: Pulmonary effort is normal    Musculoskeletal:      Cervical back: Normal range of motion and neck supple  No tenderness  Skin:     General: Skin is warm and dry  Neurological:      General: No focal deficit present  Mental Status: She is alert and oriented to person, place, and time  Motor: No weakness  Gait: Gait normal    Psychiatric:         Mood and Affect: Mood normal          Behavior: Behavior normal          Vital Signs  ED Triage Vitals [12/01/22 1559]   Temperature Pulse Respirations Blood Pressure SpO2   97 9 °F (36 6 °C) 80 18 161/74 97 %      Temp src Heart Rate Source Patient Position - Orthostatic VS BP Location FiO2 (%)   -- -- -- -- --      Pain Score       --           Vitals:    12/01/22 1559   BP: 161/74   Pulse: 80         Visual Acuity      ED Medications  Medications - No data to display    Diagnostic Studies  Results Reviewed     None                 No orders to display              Procedures  Procedures         ED Course                               SBIRT 22yo+    Flowsheet Row Most Recent Value   SBIRT (23 yo +)    In order to provide better care to our patients, we are screening all of our patients for alcohol and drug use  Would it be okay to ask you these screening questions? No Filed at: 12/01/2022 1629                    MDM  Number of Diagnoses or Management Options  Encounter for medication refill  Major depressive disorder, remission status unspecified, unspecified whether recurrent  Diagnosis management comments: 49-year-old female, presenting with chief complaint of medication refill  Patient denies any acute complaints or symptoms    Refills for patient psychiatric medications sent to pharmacy, patient states that she is currently in the process of getting follow-up with psychiatrist       Disposition  Final diagnoses:   Encounter for medication refill   Major depressive disorder, remission status unspecified, unspecified whether recurrent     Time reflects when diagnosis was documented in both MDM as applicable and the Disposition within this note     Time User Action Codes Description Comment    12/1/2022  4:50 PM Adryan Caceres Add [Z76 0] Encounter for medication refill     12/1/2022  4:54 PM Adryan Caceres Add [F32 9] Major depressive disorder, remission status unspecified, unspecified whether recurrent       ED Disposition     ED Disposition   Discharge    Condition   Stable    Date/Time   Th Dec 1, 2022  4:50 PM    Comment   Bony Gibbons discharge to home/self care                 Follow-up Information     Follow up With Specialties Details Why [address] [facility], [address] [facility], Nurse Practitioner   [address] [facility] [address] [facility]  [phone]            Discharge Medication List as of 12/1/2022  4:55 PM      CONTINUE these medications which have CHANGED    Details   FLUoxetine (PROzac) 20 mg capsule Take 1 capsule (20 mg total) by mouth daily, Starting u 12/1/2022, Normal      OLANZapine (ZyPREXA) 10 mg tablet Take 1 tablet (10 mg total) by mouth every morning, Starting Thu 12/1/2022, Until Sat 12/31/2022, Normal      QUEtiapine (SEROquel) 100 mg tablet Take 2 tablets (200 mg total) by mouth daily at bedtime, Starting Thu 12/1/2022, Until Sat 12/31/2022, Normal      rOPINIRole (REQUIP) 0 5 mg tablet Take 1 tablet (0 5 mg total) by mouth daily at bedtime, Starting u 12/1/2022, Normal         CONTINUE these medications which have NOT CHANGED    Details   albuterol (PROVENTIL HFA,VENTOLIN HFA) 90 mcg/act inhaler Inhale 2 puffs every 6 (six) hours as needed for wheezing for up to 14 doses, Starting Fri 5/28/2021, Normal      ergocalciferol (VITAMIN D2) 50,000 units TAKE 1 CAPSULE (50,000 UNITS TOTAL) BY MOUTH ONCE A WEEK, Starting Wed 5/18/2022, Normal      lisinopril (ZESTRIL) 20 mg tablet TAKE 1 TABLET (20 MG TOTAL) BY MOUTH DAILY, Starting Fri 6/17/2022, Normal      rosuvastatin (CRESTOR) 20 MG tablet TAKE 1 TABLET (20 MG TOTAL) BY MOUTH DAILY, Starting Fri 6/17/2022, Normal             No discharge procedures on file      PDMP Review     None          ED Provider  Electronically Signed by           Perry Kate MD  12/01/22 4072

## 2022-12-29 ENCOUNTER — OFFICE VISIT (OUTPATIENT)
Dept: FAMILY MEDICINE CLINIC | Facility: CLINIC | Age: 57
End: 2022-12-29

## 2022-12-29 VITALS
HEIGHT: 60 IN | RESPIRATION RATE: 18 BRPM | SYSTOLIC BLOOD PRESSURE: 128 MMHG | OXYGEN SATURATION: 99 % | TEMPERATURE: 96 F | BODY MASS INDEX: 24.15 KG/M2 | DIASTOLIC BLOOD PRESSURE: 86 MMHG | HEART RATE: 71 BPM | WEIGHT: 123 LBS

## 2022-12-29 DIAGNOSIS — I10 ESSENTIAL HYPERTENSION: Primary | ICD-10-CM

## 2022-12-29 DIAGNOSIS — Z12.31 ENCOUNTER FOR SCREENING MAMMOGRAM FOR MALIGNANT NEOPLASM OF BREAST: ICD-10-CM

## 2022-12-29 DIAGNOSIS — Z12.11 SCREENING FOR MALIGNANT NEOPLASM OF COLON: ICD-10-CM

## 2022-12-29 DIAGNOSIS — E78.2 MIXED HYPERLIPIDEMIA: ICD-10-CM

## 2022-12-29 DIAGNOSIS — E55.9 VITAMIN D DEFICIENCY: ICD-10-CM

## 2022-12-29 DIAGNOSIS — F10.10 ETOH ABUSE: ICD-10-CM

## 2022-12-29 DIAGNOSIS — J44.9 CHRONIC OBSTRUCTIVE PULMONARY DISEASE, UNSPECIFIED COPD TYPE (HCC): ICD-10-CM

## 2022-12-29 DIAGNOSIS — F41.9 ANXIETY: ICD-10-CM

## 2022-12-29 DIAGNOSIS — F33.1 MODERATE EPISODE OF RECURRENT MAJOR DEPRESSIVE DISORDER (HCC): ICD-10-CM

## 2022-12-29 RX ORDER — LISINOPRIL 20 MG/1
20 TABLET ORAL DAILY
Qty: 90 TABLET | Refills: 1 | Status: SHIPPED | OUTPATIENT
Start: 2022-12-29

## 2022-12-29 RX ORDER — ERGOCALCIFEROL 1.25 MG/1
50000 CAPSULE ORAL WEEKLY
Qty: 12 CAPSULE | Refills: 3 | Status: SHIPPED | OUTPATIENT
Start: 2022-12-29

## 2022-12-29 RX ORDER — ROSUVASTATIN CALCIUM 20 MG/1
20 TABLET, COATED ORAL DAILY
Qty: 90 TABLET | Refills: 1 | Status: SHIPPED | OUTPATIENT
Start: 2022-12-29

## 2022-12-29 NOTE — PROGRESS NOTES
Name: Gabrielle Kearney      : 1965      MRN: [id number]  Encounter Provider: LADONNA Anthony  Encounter Date: 2022   Encounter department: [address] [place] [address]  Essential hypertension  Stable  Continue blood pressure medications as ordered  Monitor blood pressure  Limit salt in diet  - lisinopril (ZESTRIL) 20 mg tablet; Take 1 tablet (20 mg total) by mouth daily  Dispense: 90 tablet; Refill: 1  2  Chronic obstructive pulmonary disease, unspecified COPD type ([facility] )  Stable  No recent or current issues  Monitor  3  ETOH abuse  Pt reports no etoh for over one month  anticipatory guidance  Discussed risks of continued etoh use   4  Anxiety  Stress management  Activities to divert attention when possible  Conscious breathing techniques as discussed  Coping mechanisms and strategies vary from person to person so try to utilize strategies that you think may work for you (such as meditation, music, etc  )  It is imperative to schedule appt with mental health provider/psych for ongoing treatment/management and resume psych meds as prescribed  Anti anxiety/depression medications as prescribed  5  Moderate episode of recurrent major depressive disorder ([facility] )  Denies suicidal ideation  It is imperative to schedule appt with mental health provider/psych for ongoing treatment/management and resume psych meds as prescribed  Discussed coping strategies and encouraged to contact family guidance today to secure appt  6  Mixed hyperlipidemia  Heart healthy diet  Statin  - rosuvastatin (CRESTOR) 20 MG tablet; Take 1 tablet (20 mg total) by mouth daily  Dispense: 90 tablet; Refill: 1  7  Vitamin D deficiency  - ergocalciferol (VITAMIN D2) 50,000 units; Take 1 capsule (50,000 Units total) by mouth once a week  Dispense: 12 capsule; Refill: 3  8  Screening for malignant neoplasm of colon  - Cologuard  9   Encounter for screening mammogram for malignant neoplasm of breast  - Mammo screening bilateral w 3d & cad; Future    Patient was counseled regarding instructions for management which included: impression/diagnosis, risk/benefits of treatment options, importance of compliance with treatment, risk factor reduction, and prognosis  I have reviewed the instructions with the patient answering all questions and patient verbalized understanding  BMI Counseling: Body mass index is 24 02 kg/m²  BMI is wnl  Counseled on well balanced diet and regular exericse  Depression Screening Follow-up Plan: Patient's depression screening was positive with a PHQ-2 score of   Their PHQ-9 score was 24  Patient assessed for underlying major depression  They have no active suicidal ideations  Brief counseling provided and recommend additional follow-up/re-evaluation next office visit  Continue regular follow-up with their psychologist/therapist/psychiatrist who is managing their mental health condition(s)  Subjective      Here for med check  Has not been taking psych meds  Was in ED on 12/1 requesting refill of psych meds at which time pt reported was trying to get pscyh appt  Psych meds were refilled by ED physician for one month  Pt states never picked up  "didn't know " so has not been taking  States does not have psych appt but is supposed to call back after 1/1  History of etoh abuse  States has not been drinking for at least one month  Review of Systems   Constitutional: Negative for unexpected weight change  Eyes: Negative for visual disturbance  Respiratory: Negative for shortness of breath  Cardiovascular: Negative for chest pain and palpitations  Skin: Negative for color change and pallor  Neurological: Negative for dizziness and headaches  Psychiatric/Behavioral: Positive for dysphoric mood  Negative for self-injury and suicidal ideas  The patient is nervous/anxious          Current Outpatient Medications on File Prior to Visit   Medication Sig   • albuterol (PROVENTIL HFA,VENTOLIN HFA) 90 mcg/act inhaler Inhale 2 puffs every 6 (six) hours as needed for wheezing for up to 14 doses   • ergocalciferol (VITAMIN D2) 50,000 units TAKE 1 CAPSULE (50,000 UNITS TOTAL) BY MOUTH ONCE A WEEK   • lisinopril (ZESTRIL) 20 mg tablet TAKE 1 TABLET (20 MG TOTAL) BY MOUTH DAILY   • rosuvastatin (CRESTOR) 20 MG tablet TAKE 1 TABLET (20 MG TOTAL) BY MOUTH DAILY   • FLUoxetine (PROzac) 20 mg capsule Take 1 capsule (20 mg total) by mouth daily (Patient not taking: Reported on 12/29/2022)   • OLANZapine (ZyPREXA) 10 mg tablet Take 1 tablet (10 mg total) by mouth every morning (Patient not taking: Reported on 12/29/2022)   • QUEtiapine (SEROquel) 100 mg tablet Take 2 tablets (200 mg total) by mouth daily at bedtime (Patient not taking: Reported on 12/29/2022)   • rOPINIRole (REQUIP) 0 5 mg tablet Take 1 tablet (0 5 mg total) by mouth daily at bedtime (Patient not taking: Reported on 12/29/2022)       Objective     /86   Pulse 71   Temp (!) 96 °F (35 6 °C) (Temporal)   Resp 18   Ht 5' (1 524 m)   Wt 55 8 kg (123 lb)   SpO2 99%   BMI 24 02 kg/m²     Physical Exam  Vitals reviewed  Constitutional:       General: She is not in acute distress  Appearance: She is not ill-appearing  Neck:      Vascular: No carotid bruit  Cardiovascular:      Rate and Rhythm: Normal rate and regular rhythm  Pulmonary:      Effort: Pulmonary effort is normal  No respiratory distress  Breath sounds: Normal breath sounds  No wheezing or rales  Musculoskeletal:      Cervical back: Normal range of motion and neck supple  Right lower leg: No edema  Left lower leg: No edema  Skin:     General: Skin is warm and dry  Coloration: Skin is not jaundiced or pale  Neurological:      General: No focal deficit present  Mental Status: She is alert and oriented to person, place, and time  Cranial Nerves: No cranial nerve deficit  Sensory: No sensory deficit  Psychiatric:         Mood and Affect: Mood normal          Thought Content: Thought content normal       Comments: Dressed appropriately for the weather  Calm  Pleasant   Cooperative  Good eye contact  Converses freely and appropriately            LADONNA Mcclure

## 2022-12-30 ENCOUNTER — HOSPITAL ENCOUNTER (EMERGENCY)
Facility: HOSPITAL | Age: 57
Discharge: HOME | End: 2022-12-30
Attending: EMERGENCY MEDICINE

## 2022-12-30 VITALS
DIASTOLIC BLOOD PRESSURE: 65 MMHG | HEART RATE: 68 BPM | RESPIRATION RATE: 18 BRPM | WEIGHT: 121 LBS | TEMPERATURE: 97.6 F | SYSTOLIC BLOOD PRESSURE: 132 MMHG | OXYGEN SATURATION: 100 % | BODY MASS INDEX: 23.63 KG/M2

## 2022-12-30 DIAGNOSIS — Z76.0 MEDICATION REFILL: Primary | ICD-10-CM

## 2022-12-30 DIAGNOSIS — F32.9 MAJOR DEPRESSIVE DISORDER, REMISSION STATUS UNSPECIFIED, UNSPECIFIED WHETHER RECURRENT: ICD-10-CM

## 2022-12-30 RX ORDER — FLUOXETINE 20 MG/1
20 TABLET, FILM COATED ORAL DAILY
Qty: 30 TABLET | Refills: 0 | Status: SHIPPED | OUTPATIENT
Start: 2022-12-30

## 2022-12-30 RX ORDER — QUETIAPINE FUMARATE 100 MG/1
200 TABLET, FILM COATED ORAL
Qty: 60 TABLET | Refills: 0 | Status: SHIPPED | OUTPATIENT
Start: 2022-12-30 | End: 2023-01-29

## 2022-12-30 RX ORDER — ROPINIROLE 0.5 MG/1
0.5 TABLET, FILM COATED ORAL
Qty: 30 TABLET | Refills: 0 | Status: SHIPPED | OUTPATIENT
Start: 2022-12-30

## 2022-12-30 RX ORDER — OLANZAPINE 10 MG/1
10 TABLET ORAL EVERY MORNING
Qty: 30 TABLET | Refills: 0 | Status: SHIPPED | OUTPATIENT
Start: 2022-12-30 | End: 2023-01-29

## 2022-12-30 NOTE — ED NOTES
12/30/22 @ 1214:  [facility] and spoke to Elisabeth Corteskalb Jessica about providing patient with an emergency service appointment, as patient no longer has a provider to prescribe her psychotropic medication  Patient went to her PCP, who refused to write prescriptions for psychotropics  ED PA is willing to provide short-term prescriptions, but would like patient to have a follow-up appointment  Kateirne will check schedule and call back  [address], [address] [facility]: PES called [facility] for update and Katerine reports that she aid,  "I haven't  had time to check schedule, but will as soon as I can and call you back "  Ivonne, [place]    1357: PES spoke to Katerine from [facility] and she was unable to look at schedule, so in the interest of time, Katerine requested that PES provide patient with crisis line number and 30 days medication  PES consulted with ED PA, and he was in agreement with plan  Patient to be discharged with 30 day prescriptions and contact information for Crisis    [address], [place]

## 2022-12-30 NOTE — DISCHARGE INSTRUCTIONS
Contact [facility] at  for emergency services appointment    Return to ED for worsening symptoms

## 2023-04-30 DIAGNOSIS — I10 ESSENTIAL HYPERTENSION: ICD-10-CM

## 2023-05-01 DIAGNOSIS — R79.89 ELEVATED LFTS: ICD-10-CM

## 2023-05-01 DIAGNOSIS — I10 ESSENTIAL HYPERTENSION: Primary | ICD-10-CM

## 2023-05-01 DIAGNOSIS — E78.2 MIXED HYPERLIPIDEMIA: ICD-10-CM

## 2023-05-01 DIAGNOSIS — E55.9 VITAMIN D DEFICIENCY: ICD-10-CM

## 2023-05-01 DIAGNOSIS — F10.10 ETOH ABUSE: ICD-10-CM

## 2023-05-01 DIAGNOSIS — R73.9 ELEVATED SERUM GLUCOSE: ICD-10-CM

## 2023-05-01 RX ORDER — LISINOPRIL 20 MG/1
20 TABLET ORAL DAILY
Qty: 90 TABLET | Refills: 0 | Status: SHIPPED | OUTPATIENT
Start: 2023-05-01

## 2023-05-01 NOTE — TELEPHONE ENCOUNTER
Patient scheduled for cpx appt 5/19  Advised patient that labs have been ordered and need to be completed prior to appt

## 2023-05-01 NOTE — TELEPHONE ENCOUNTER
Pt is overdue for appt and labs  Lab orders in chart  Call pt and advise and schedule for physical/fu/lab review

## 2023-05-09 ENCOUNTER — TELEPHONE (OUTPATIENT)
Dept: FAMILY MEDICINE CLINIC | Facility: CLINIC | Age: 58
End: 2023-05-09

## 2023-05-09 NOTE — TELEPHONE ENCOUNTER
Patient advised to have  blood work completed prior to appt 5/19  Advised patient if labs are not completed the appt will need to be rescheduled

## 2023-06-13 DIAGNOSIS — E78.2 MIXED HYPERLIPIDEMIA: ICD-10-CM

## 2023-06-13 RX ORDER — ROSUVASTATIN CALCIUM 20 MG/1
20 TABLET, COATED ORAL DAILY
Qty: 90 TABLET | Refills: 0 | Status: SHIPPED | OUTPATIENT
Start: 2023-06-13

## 2023-06-15 ENCOUNTER — TELEPHONE (OUTPATIENT)
Dept: FAMILY MEDICINE CLINIC | Facility: CLINIC | Age: 58
End: 2023-06-15

## 2023-06-15 NOTE — TELEPHONE ENCOUNTER
----- Message from [address] [place] [address] sent at 6/15/2023  8:15 AM EDT -----  Regarding: labs  Pt has upcoming appt for physical on 6/20  Needs to have labs done prior to appt  Orders in chart  Please call pt to advise  If labs are not done, appt will need to be re-scheduled  TY     for pt to have labs done prior to appt or will need to be rescheduled

## 2023-06-27 ENCOUNTER — TELEPHONE (OUTPATIENT)
Dept: FAMILY MEDICINE CLINIC | Facility: CLINIC | Age: 58
End: 2023-06-27

## 2023-06-27 NOTE — TELEPHONE ENCOUNTER
----- Message from [address] [place] [address] sent at 6/27/2023  7:00 AM EDT -----  Regarding: labs  Pt has upcoming appt for physical on 6/29  Needs to have labs done prior to appt  Orders in chart  Please call pt to advise  If labs are not done, appt will need to be re-scheduled   TY  (Also, if pt keeping appt, needs to be 30 min for physical  Currently only for 15 min)    Lm for pt to have labs done prior to appointment

## 2023-06-29 LAB
25(OH)D3+25(OH)D2 SERPL-MCNC: 9.5 NG/ML (ref 30–100)
ALBUMIN SERPL-MCNC: 4.8 G/DL (ref 3.8–4.9)
ALBUMIN/GLOB SERPL: 1.8 {RATIO} (ref 1.2–2.2)
ALP SERPL-CCNC: 88 IU/L (ref 44–121)
ALT SERPL-CCNC: 30 IU/L (ref 0–32)
AST SERPL-CCNC: 40 IU/L (ref 0–40)
BASOPHILS # BLD AUTO: 0.1 X10E3/UL (ref 0–0.2)
BASOPHILS NFR BLD AUTO: 0 %
BILIRUB SERPL-MCNC: 0.6 MG/DL (ref 0–1.2)
BUN SERPL-MCNC: 5 MG/DL (ref 6–24)
BUN/CREAT SERPL: 6 (ref 9–23)
CALCIUM SERPL-MCNC: 10.2 MG/DL (ref 8.7–10.2)
CHLORIDE SERPL-SCNC: 96 MMOL/L (ref 96–106)
CHOLEST SERPL-MCNC: 227 MG/DL (ref 100–199)
CHOLEST/HDLC SERPL: 2.2 RATIO (ref 0–4.4)
CO2 SERPL-SCNC: 21 MMOL/L (ref 20–29)
CREAT SERPL-MCNC: 0.85 MG/DL (ref 0.57–1)
EGFR: 79 ML/MIN/1.73
EOSINOPHIL # BLD AUTO: 0 X10E3/UL (ref 0–0.4)
EOSINOPHIL NFR BLD AUTO: 0 %
ERYTHROCYTE [DISTWIDTH] IN BLOOD BY AUTOMATED COUNT: 12.1 % (ref 11.7–15.4)
EST. AVERAGE GLUCOSE BLD GHB EST-MCNC: 114 MG/DL
GLOBULIN SER-MCNC: 2.7 G/DL (ref 1.5–4.5)
GLUCOSE SERPL-MCNC: 120 MG/DL (ref 70–99)
HBA1C MFR BLD: 5.6 % (ref 4.8–5.6)
HCT VFR BLD AUTO: 40.5 % (ref 34–46.6)
HDLC SERPL-MCNC: 103 MG/DL
HGB BLD-MCNC: 13.8 G/DL (ref 11.1–15.9)
IMM GRANULOCYTES # BLD: 0 X10E3/UL (ref 0–0.1)
IMM GRANULOCYTES NFR BLD: 0 %
LDLC SERPL CALC-MCNC: 100 MG/DL (ref 0–99)
LYMPHOCYTES # BLD AUTO: 1.4 X10E3/UL (ref 0.7–3.1)
LYMPHOCYTES NFR BLD AUTO: 11 %
MCH RBC QN AUTO: 32.5 PG (ref 26.6–33)
MCHC RBC AUTO-ENTMCNC: 34.1 G/DL (ref 31.5–35.7)
MCV RBC AUTO: 96 FL (ref 79–97)
MONOCYTES # BLD AUTO: 0.8 X10E3/UL (ref 0.1–0.9)
MONOCYTES NFR BLD AUTO: 6 %
NEUTROPHILS # BLD AUTO: 11.1 X10E3/UL (ref 1.4–7)
NEUTROPHILS NFR BLD AUTO: 83 %
PLATELET # BLD AUTO: 354 X10E3/UL (ref 150–450)
POTASSIUM SERPL-SCNC: 4.5 MMOL/L (ref 3.5–5.2)
PROT SERPL-MCNC: 7.5 G/DL (ref 6–8.5)
RBC # BLD AUTO: 4.24 X10E6/UL (ref 3.77–5.28)
SL AMB VLDL CHOLESTEROL CALC: 24 MG/DL (ref 5–40)
SODIUM SERPL-SCNC: 134 MMOL/L (ref 134–144)
TRIGL SERPL-MCNC: 145 MG/DL (ref 0–149)
WBC # BLD AUTO: 13.4 X10E3/UL (ref 3.4–10.8)

## 2023-07-11 ENCOUNTER — TELEPHONE (OUTPATIENT)
Dept: FAMILY MEDICINE CLINIC | Facility: CLINIC | Age: 58
End: 2023-07-11

## 2023-07-11 NOTE — TELEPHONE ENCOUNTER
----- Message from Isaac Bryant sent at 7/11/2023  1:41 PM EDT -----  Pt overdue for appt for physical, FU, and lab review  Please call to schedule. TY  Lm for pt to call the office to schedule her physical and lab review.

## 2023-08-28 DIAGNOSIS — E78.2 MIXED HYPERLIPIDEMIA: ICD-10-CM

## 2023-08-28 RX ORDER — ROSUVASTATIN CALCIUM 20 MG/1
20 TABLET, COATED ORAL DAILY
Qty: 90 TABLET | Refills: 1 | Status: SHIPPED | OUTPATIENT
Start: 2023-08-28

## 2023-10-09 DIAGNOSIS — I10 ESSENTIAL HYPERTENSION: ICD-10-CM

## 2023-10-09 DIAGNOSIS — E78.2 MIXED HYPERLIPIDEMIA: ICD-10-CM

## 2023-10-09 RX ORDER — LISINOPRIL 20 MG/1
20 TABLET ORAL DAILY
Qty: 30 TABLET | Refills: 0 | Status: SHIPPED | OUTPATIENT
Start: 2023-10-09

## 2023-10-09 RX ORDER — ROSUVASTATIN CALCIUM 20 MG/1
20 TABLET, COATED ORAL DAILY
Qty: 90 TABLET | Refills: 1 | Status: SHIPPED | OUTPATIENT
Start: 2023-10-09

## 2023-10-09 NOTE — TELEPHONE ENCOUNTER
Medication Refill Request     Name Lisinopril   Dose/Frequency 20mg daily  Quantity 90  Verified pharmacy   [x]  Verified ordering Provider   [x]  Does patient have enough for the next 3 days? Yes [] No [x]    Medication Refill Request     Name Rosuvastatin   Dose/Frequency 20mg daily  Quantity 90  Verified pharmacy   [x]  Verified ordering Provider   [x]  Does patient have enough for the next 3 days?  Yes [] No [x]

## 2024-01-01 NOTE — PATIENT INSTRUCTIONS
It is imperative that you schedule appt with psychiatrist as previously advised  Counseling and mental health follow up has been recommended  Please contact [facility] (Rick Workman, [address] [phone]) to schedule appointment   prescriptions for psychiatric medications that were ordered by emergency physician on 12/1/22 as discussed and resume  Further mental health medications must come from treating mental health provider  Continue medications for blood pressure, cholesterol, and vitamin D deficiency as prescribed  0

## 2024-01-15 ENCOUNTER — TELEPHONE (OUTPATIENT)
Age: 59
End: 2024-01-15

## 2024-01-15 DIAGNOSIS — I10 ESSENTIAL HYPERTENSION: ICD-10-CM

## 2024-01-15 DIAGNOSIS — E78.2 MIXED HYPERLIPIDEMIA: ICD-10-CM

## 2024-01-15 RX ORDER — LISINOPRIL 20 MG/1
20 TABLET ORAL DAILY
Qty: 30 TABLET | Refills: 0 | Status: SHIPPED | OUTPATIENT
Start: 2024-01-15

## 2024-01-15 RX ORDER — ROSUVASTATIN CALCIUM 20 MG/1
20 TABLET, COATED ORAL DAILY
Qty: 30 TABLET | Refills: 0 | Status: SHIPPED | OUTPATIENT
Start: 2024-01-15

## 2024-01-15 NOTE — TELEPHONE ENCOUNTER
Patient called regarding refill request for  lisinopril and rosuvastatin.I informed the patient it was sent to the pharmacy on 1/15/2024.Patient is going to check with the pharmacy.

## 2024-01-15 NOTE — TELEPHONE ENCOUNTER
Reason for call:   [] Refill   [] Prior Auth  [x] Other:     Pt calling for status of medication lisinopril and rosuvastatin. Patient is out of medication requesting medication be sent ASAP PLEASE.

## 2024-01-15 NOTE — TELEPHONE ENCOUNTER
Refill Specialist received prompt for call back to patient, patient did not answer, voicemail left for patient to give office call back if she needs further assistance or refills.

## 2024-01-15 NOTE — TELEPHONE ENCOUNTER
Reason for call:   [x] Refill   [] Prior Auth  [] Other:     Office:   [x] PCP/Provider -   [] Specialty/Provider -   rosuvastatin (CRESTOR) 20 MG tablet#90  Medication: lisinopril (ZESTRIL) 20 mg tablet #30,          [facility]    Does the patient have enough for 3 days?   [] Yes   [x] No - Send as HP to POD

## 2024-01-16 DIAGNOSIS — I10 ESSENTIAL HYPERTENSION: Primary | ICD-10-CM

## 2024-01-16 DIAGNOSIS — E55.9 VITAMIN D DEFICIENCY: ICD-10-CM

## 2024-01-16 DIAGNOSIS — F10.10 ETOH ABUSE: ICD-10-CM

## 2024-01-16 DIAGNOSIS — R79.89 ELEVATED LFTS: ICD-10-CM

## 2024-01-16 DIAGNOSIS — E78.2 MIXED HYPERLIPIDEMIA: ICD-10-CM

## 2024-01-30 ENCOUNTER — TELEPHONE (OUTPATIENT)
Dept: FAMILY MEDICINE CLINIC | Facility: CLINIC | Age: 59
End: 2024-01-30

## 2024-01-30 NOTE — TELEPHONE ENCOUNTER
----- Message from LADONNA De sent at 1/30/2024 11:11 AM EST -----  Regarding: labs  Pt has upcoming appt for physical on 2/1. Needs to have labs done prior to appt. Orders in chart.   Not done as of today.   Please call pt to advise. If labs are not done, appt will need to be re-scheduled. TY

## 2024-02-16 DIAGNOSIS — I10 ESSENTIAL HYPERTENSION: ICD-10-CM

## 2024-02-16 RX ORDER — LISINOPRIL 20 MG/1
20 TABLET ORAL DAILY
Qty: 30 TABLET | Refills: 0 | OUTPATIENT
Start: 2024-02-16

## 2024-02-16 NOTE — TELEPHONE ENCOUNTER
Reason for call:   [x] Refill   [] Prior Auth  [] Other:     Office:   [x] PCP/Provider -   [] Specialty/Provider -     Medication: lisinopril (ZESTRIL) 20 mg tablet     Dose/Frequency:   Take 1 tablet (20 mg total) by mouth daily     Quantity: #30    Pharmacy: [address]     Does the patient have enough for 3 days?   [] Yes   [x] No - Send as HP to POD

## 2024-02-16 NOTE — TELEPHONE ENCOUNTER
Patient called and said that she has been out of here medication for a week. Is going to make an appointment and is going to get blood work done as well and would like to know if she can get a refill on her medication.    Please contact patient at [phone] if it is possible to do.

## 2024-02-22 ENCOUNTER — TELEPHONE (OUTPATIENT)
Dept: FAMILY MEDICINE CLINIC | Facility: CLINIC | Age: 59
End: 2024-02-22

## 2024-02-22 NOTE — TELEPHONE ENCOUNTER
We received fax from [place] for [facility] for labs to be drawn. I called and left voice mail for [place] to call back. The pt has not been seen since 12/2022 and we have been trying to call her to schedule an appt and have her get labs done. She either does not answer or she does not understand what we are trying to say to her.

## 2024-02-27 ENCOUNTER — TELEPHONE (OUTPATIENT)
Dept: FAMILY MEDICINE CLINIC | Facility: CLINIC | Age: 59
End: 2024-02-27

## 2024-02-27 NOTE — TELEPHONE ENCOUNTER
Spoke to patient ...  She indicates she has covid and will go to get her labs done as soon as she feels better and then will call the office to schedule her appt.

## 2024-03-19 ENCOUNTER — TELEPHONE (OUTPATIENT)
Age: 59
End: 2024-03-19

## 2024-03-19 ENCOUNTER — APPOINTMENT (OUTPATIENT)
Dept: LAB | Facility: CLINIC | Age: 59
End: 2024-03-19
Payer: COMMERCIAL

## 2024-03-19 DIAGNOSIS — Z79.899 OTHER LONG TERM (CURRENT) DRUG THERAPY: ICD-10-CM

## 2024-03-19 DIAGNOSIS — R79.89 HYPOURICEMIA: ICD-10-CM

## 2024-03-19 DIAGNOSIS — I10 ESSENTIAL HYPERTENSION, MALIGNANT: Primary | ICD-10-CM

## 2024-03-19 DIAGNOSIS — E55.9 AVITAMINOSIS D: ICD-10-CM

## 2024-03-19 DIAGNOSIS — R79.89 ELEVATED LFTS: ICD-10-CM

## 2024-03-19 DIAGNOSIS — E78.2 MIXED HYPERLIPIDEMIA: ICD-10-CM

## 2024-03-19 DIAGNOSIS — F10.10 ALCOHOL ABUSE, CONTINUOUS: ICD-10-CM

## 2024-03-19 LAB
25(OH)D3 SERPL-MCNC: 20.9 NG/ML (ref 30–100)
ALBUMIN SERPL BCP-MCNC: 4.6 G/DL (ref 3.5–5)
ALP SERPL-CCNC: 92 U/L (ref 34–104)
ALT SERPL W P-5'-P-CCNC: 58 U/L (ref 7–52)
AMYLASE SERPL-CCNC: 34 IU/L (ref 29–103)
ANION GAP SERPL CALCULATED.3IONS-SCNC: 13 MMOL/L (ref 4–13)
AST SERPL W P-5'-P-CCNC: 96 U/L (ref 13–39)
BASOPHILS # BLD AUTO: 0.05 THOUSANDS/ÂΜL (ref 0–0.1)
BASOPHILS NFR BLD AUTO: 0 % (ref 0–1)
BILIRUB DIRECT SERPL-MCNC: 0.17 MG/DL (ref 0–0.2)
BILIRUB SERPL-MCNC: 1.18 MG/DL (ref 0.2–1)
BUN SERPL-MCNC: 10 MG/DL (ref 5–25)
CALCIUM SERPL-MCNC: 9.4 MG/DL (ref 8.4–10.2)
CHLORIDE SERPL-SCNC: 104 MMOL/L (ref 96–108)
CHOLEST SERPL-MCNC: 248 MG/DL
CO2 SERPL-SCNC: 22 MMOL/L (ref 21–32)
CREAT SERPL-MCNC: 0.78 MG/DL (ref 0.6–1.3)
EOSINOPHIL # BLD AUTO: 0.02 THOUSAND/ÂΜL (ref 0–0.61)
EOSINOPHIL NFR BLD AUTO: 0 % (ref 0–6)
ERYTHROCYTE [DISTWIDTH] IN BLOOD BY AUTOMATED COUNT: 12.7 % (ref 11.6–15.1)
EST. AVERAGE GLUCOSE BLD GHB EST-MCNC: 123 MG/DL
FOLATE SERPL-MCNC: 10.7 NG/ML
GFR SERPL CREATININE-BSD FRML MDRD: 82 ML/MIN/1.73SQ M
GLUCOSE P FAST SERPL-MCNC: 138 MG/DL (ref 65–99)
HBA1C MFR BLD: 5.9 %
HCT VFR BLD AUTO: 42.2 % (ref 34.8–46.1)
HDLC SERPL-MCNC: 79 MG/DL
HGB BLD-MCNC: 14 G/DL (ref 11.5–15.4)
IMM GRANULOCYTES # BLD AUTO: 0.08 THOUSAND/UL (ref 0–0.2)
IMM GRANULOCYTES NFR BLD AUTO: 1 % (ref 0–2)
LDLC SERPL CALC-MCNC: 111 MG/DL (ref 0–100)
LIPASE SERPL-CCNC: 32 U/L (ref 11–82)
LYMPHOCYTES # BLD AUTO: 1.15 THOUSANDS/ÂΜL (ref 0.6–4.47)
LYMPHOCYTES NFR BLD AUTO: 8 % (ref 14–44)
MCH RBC QN AUTO: 32.6 PG (ref 26.8–34.3)
MCHC RBC AUTO-ENTMCNC: 33.2 G/DL (ref 31.4–37.4)
MCV RBC AUTO: 98 FL (ref 82–98)
MONOCYTES # BLD AUTO: 0.75 THOUSAND/ÂΜL (ref 0.17–1.22)
MONOCYTES NFR BLD AUTO: 5 % (ref 4–12)
NEUTROPHILS # BLD AUTO: 11.86 THOUSANDS/ÂΜL (ref 1.85–7.62)
NEUTS SEG NFR BLD AUTO: 86 % (ref 43–75)
NONHDLC SERPL-MCNC: 169 MG/DL
NRBC BLD AUTO-RTO: 0 /100 WBCS
PLATELET # BLD AUTO: 318 THOUSANDS/UL (ref 149–390)
PMV BLD AUTO: 10.2 FL (ref 8.9–12.7)
POTASSIUM SERPL-SCNC: 4.2 MMOL/L (ref 3.5–5.3)
PROT SERPL-MCNC: 7.2 G/DL (ref 6.4–8.4)
RBC # BLD AUTO: 4.3 MILLION/UL (ref 3.81–5.12)
SODIUM SERPL-SCNC: 139 MMOL/L (ref 135–147)
T4 SERPL-MCNC: 6.4 UG/DL (ref 6.09–12.23)
TRIGL SERPL-MCNC: 291 MG/DL
TSH SERPL DL<=0.05 MIU/L-ACNC: 1.61 UIU/ML (ref 0.45–4.5)
VIT B12 SERPL-MCNC: 324 PG/ML (ref 180–914)
WBC # BLD AUTO: 13.91 THOUSAND/UL (ref 4.31–10.16)

## 2024-03-19 PROCEDURE — 80061 LIPID PANEL: CPT

## 2024-03-19 PROCEDURE — 82607 VITAMIN B-12: CPT

## 2024-03-19 PROCEDURE — 80053 COMPREHEN METABOLIC PANEL: CPT

## 2024-03-19 PROCEDURE — 82746 ASSAY OF FOLIC ACID SERUM: CPT

## 2024-03-19 PROCEDURE — 85025 COMPLETE CBC W/AUTO DIFF WBC: CPT

## 2024-03-19 PROCEDURE — 82248 BILIRUBIN DIRECT: CPT

## 2024-03-19 PROCEDURE — 84443 ASSAY THYROID STIM HORMONE: CPT

## 2024-03-19 PROCEDURE — 82150 ASSAY OF AMYLASE: CPT

## 2024-03-19 PROCEDURE — 84436 ASSAY OF TOTAL THYROXINE: CPT

## 2024-03-19 PROCEDURE — 82306 VITAMIN D 25 HYDROXY: CPT

## 2024-03-19 PROCEDURE — 36415 COLL VENOUS BLD VENIPUNCTURE: CPT

## 2024-03-19 PROCEDURE — 83036 HEMOGLOBIN GLYCOSYLATED A1C: CPT

## 2024-03-19 PROCEDURE — 84479 ASSAY OF THYROID (T3 OR T4): CPT

## 2024-03-19 PROCEDURE — 83690 ASSAY OF LIPASE: CPT

## 2024-03-19 NOTE — TELEPHONE ENCOUNTER
Patient is calling in regarding her medication refill for her lisinopril and vitamin D. She states she is completely out for a week and had her labs done today and she needs her refill as soon as possible    Please advise

## 2024-03-19 NOTE — TELEPHONE ENCOUNTER
Called and spoke with pt advised that she needs to be seen before we can refill any medications she has not been seen in 1.5 years. Pt is scheduled for 03/29

## 2024-03-21 LAB — T3RU NFR SERPL: 27 % (ref 24–39)

## 2024-03-25 ENCOUNTER — OFFICE VISIT (OUTPATIENT)
Dept: FAMILY MEDICINE CLINIC | Facility: CLINIC | Age: 59
End: 2024-03-25
Payer: COMMERCIAL

## 2024-03-25 VITALS
OXYGEN SATURATION: 98 % | DIASTOLIC BLOOD PRESSURE: 82 MMHG | SYSTOLIC BLOOD PRESSURE: 124 MMHG | BODY MASS INDEX: 30.24 KG/M2 | HEART RATE: 94 BPM | RESPIRATION RATE: 14 BRPM | WEIGHT: 150 LBS | HEIGHT: 59 IN | TEMPERATURE: 98.9 F

## 2024-03-25 DIAGNOSIS — E78.2 MIXED HYPERLIPIDEMIA: ICD-10-CM

## 2024-03-25 DIAGNOSIS — R79.89 ELEVATED LFTS: ICD-10-CM

## 2024-03-25 DIAGNOSIS — F17.210 CIGARETTE NICOTINE DEPENDENCE WITHOUT COMPLICATION: ICD-10-CM

## 2024-03-25 DIAGNOSIS — F10.10 ETOH ABUSE: ICD-10-CM

## 2024-03-25 DIAGNOSIS — Z12.31 ENCOUNTER FOR SCREENING MAMMOGRAM FOR MALIGNANT NEOPLASM OF BREAST: ICD-10-CM

## 2024-03-25 DIAGNOSIS — Z12.11 SCREENING FOR MALIGNANT NEOPLASM OF COLON: ICD-10-CM

## 2024-03-25 DIAGNOSIS — I10 ESSENTIAL HYPERTENSION: Primary | ICD-10-CM

## 2024-03-25 DIAGNOSIS — Z00.00 ANNUAL PHYSICAL EXAM: ICD-10-CM

## 2024-03-25 DIAGNOSIS — E55.9 VITAMIN D DEFICIENCY: ICD-10-CM

## 2024-03-25 PROCEDURE — 99396 PREV VISIT EST AGE 40-64: CPT | Performed by: NURSE PRACTITIONER

## 2024-03-25 PROCEDURE — 99214 OFFICE O/P EST MOD 30 MIN: CPT | Performed by: NURSE PRACTITIONER

## 2024-03-25 RX ORDER — ERGOCALCIFEROL 1.25 MG/1
50000 CAPSULE ORAL WEEKLY
Qty: 12 CAPSULE | Refills: 3 | Status: SHIPPED | OUTPATIENT
Start: 2024-03-25

## 2024-03-25 RX ORDER — ROSUVASTATIN CALCIUM 20 MG/1
20 TABLET, COATED ORAL DAILY
Qty: 90 TABLET | Refills: 0 | Status: SHIPPED | OUTPATIENT
Start: 2024-03-25

## 2024-03-25 RX ORDER — LISINOPRIL 20 MG/1
20 TABLET ORAL DAILY
Qty: 90 TABLET | Refills: 0 | Status: SHIPPED | OUTPATIENT
Start: 2024-03-25

## 2024-03-25 NOTE — PATIENT INSTRUCTIONS
It is important that you take medications as prescribed  Bloodwork to be repeated in 3 months, prior to next appt.   No alcohol  Consider smoking cessation  Continue care with psychiatrist   Heart healthy, carbohydrate controlled diet- limit red meat, limit saturated fat, moderate salt intake, limit junk food, etc.   Regular exercise  Stress management  Routine labwork and screenings as ordered. Mammogram to be scheduled. Cologuard has been ordered for colon cancer screening

## 2024-04-04 ENCOUNTER — TELEPHONE (OUTPATIENT)
Age: 59
End: 2024-04-04

## 2024-04-04 NOTE — TELEPHONE ENCOUNTER
Please call pt  Reviewed labs at appt and advised to continue statin as prescribed.   There is a question about if she had been taking statin regularly since had not been filled for many months , and yet she still said she was taking it daily.   For that reason, I did not increase or change dose.   Will repeat labs prior to next appt and if dose needs to be adjusted at that time, will do so.

## 2024-04-04 NOTE — TELEPHONE ENCOUNTER
Pt called back regarding her cholesterol. Advised to continue to take statin daily and will repeat labs prior to next appt.

## 2024-06-14 ENCOUNTER — APPOINTMENT (OUTPATIENT)
Dept: LAB | Facility: CLINIC | Age: 59
End: 2024-06-14
Payer: COMMERCIAL

## 2024-06-14 DIAGNOSIS — R79.89 ELEVATED LIVER FUNCTION TESTS: ICD-10-CM

## 2024-06-14 DIAGNOSIS — E78.5 HYPERLIPIDEMIA, UNSPECIFIED HYPERLIPIDEMIA TYPE: ICD-10-CM

## 2024-06-14 DIAGNOSIS — I10 HYPERTENSION, ESSENTIAL: Primary | ICD-10-CM

## 2024-06-14 LAB
25(OH)D3 SERPL-MCNC: 31.6 NG/ML (ref 30–100)
ALBUMIN SERPL BCP-MCNC: 4.4 G/DL (ref 3.5–5)
ALP SERPL-CCNC: 76 U/L (ref 34–104)
ALT SERPL W P-5'-P-CCNC: 34 U/L (ref 7–52)
ANION GAP SERPL CALCULATED.3IONS-SCNC: 14 MMOL/L (ref 4–13)
AST SERPL W P-5'-P-CCNC: 43 U/L (ref 13–39)
BILIRUB SERPL-MCNC: 0.51 MG/DL (ref 0.2–1)
BUN SERPL-MCNC: 9 MG/DL (ref 5–25)
CALCIUM SERPL-MCNC: 9.7 MG/DL (ref 8.4–10.2)
CHLORIDE SERPL-SCNC: 101 MMOL/L (ref 96–108)
CHOLEST SERPL-MCNC: 229 MG/DL
CO2 SERPL-SCNC: 23 MMOL/L (ref 21–32)
CREAT SERPL-MCNC: 0.71 MG/DL (ref 0.6–1.3)
GFR SERPL CREATININE-BSD FRML MDRD: 92 ML/MIN/1.73SQ M
GLUCOSE P FAST SERPL-MCNC: 129 MG/DL (ref 65–99)
HDLC SERPL-MCNC: 81 MG/DL
LDLC SERPL CALC-MCNC: 114 MG/DL (ref 0–100)
NONHDLC SERPL-MCNC: 148 MG/DL
POTASSIUM SERPL-SCNC: 4.7 MMOL/L (ref 3.5–5.3)
PROT SERPL-MCNC: 7.2 G/DL (ref 6.4–8.4)
SODIUM SERPL-SCNC: 138 MMOL/L (ref 135–147)
TRIGL SERPL-MCNC: 168 MG/DL

## 2024-06-14 PROCEDURE — 80061 LIPID PANEL: CPT

## 2024-06-14 PROCEDURE — 82306 VITAMIN D 25 HYDROXY: CPT

## 2024-06-14 PROCEDURE — 80053 COMPREHEN METABOLIC PANEL: CPT

## 2024-06-14 PROCEDURE — 36415 COLL VENOUS BLD VENIPUNCTURE: CPT

## 2024-06-17 DIAGNOSIS — I10 ESSENTIAL HYPERTENSION: ICD-10-CM

## 2024-06-17 RX ORDER — LISINOPRIL 20 MG/1
20 TABLET ORAL DAILY
Qty: 90 TABLET | Refills: 1 | Status: SHIPPED | OUTPATIENT
Start: 2024-06-17

## 2024-08-12 DIAGNOSIS — E78.2 MIXED HYPERLIPIDEMIA: ICD-10-CM

## 2024-08-12 RX ORDER — ROSUVASTATIN CALCIUM 20 MG/1
20 TABLET, COATED ORAL DAILY
Qty: 90 TABLET | Refills: 1 | Status: SHIPPED | OUTPATIENT
Start: 2024-08-12

## 2024-11-11 ENCOUNTER — OFFICE VISIT (OUTPATIENT)
Dept: URGENT CARE | Facility: CLINIC | Age: 59
End: 2024-11-11
Payer: COMMERCIAL

## 2024-11-11 ENCOUNTER — APPOINTMENT (EMERGENCY)
Dept: RADIOLOGY | Facility: HOSPITAL | Age: 59
DRG: 140 | End: 2024-11-11
Payer: COMMERCIAL

## 2024-11-11 ENCOUNTER — HOSPITAL ENCOUNTER (INPATIENT)
Facility: HOSPITAL | Age: 59
LOS: 5 days | Discharge: HOME HEALTH | DRG: 140 | End: 2024-11-16
Attending: EMERGENCY MEDICINE | Admitting: INTERNAL MEDICINE
Payer: COMMERCIAL

## 2024-11-11 ENCOUNTER — APPOINTMENT (INPATIENT)
Dept: RADIOLOGY | Facility: HOSPITAL | Age: 59
DRG: 140 | End: 2024-11-11
Payer: COMMERCIAL

## 2024-11-11 VITALS
TEMPERATURE: 98.1 F | DIASTOLIC BLOOD PRESSURE: 70 MMHG | SYSTOLIC BLOOD PRESSURE: 138 MMHG | HEART RATE: 138 BPM | OXYGEN SATURATION: 99 % | RESPIRATION RATE: 20 BRPM

## 2024-11-11 DIAGNOSIS — R19.7 DIARRHEA: ICD-10-CM

## 2024-11-11 DIAGNOSIS — R11.10 VOMITING AND DIARRHEA: ICD-10-CM

## 2024-11-11 DIAGNOSIS — F17.210 CIGARETTE NICOTINE DEPENDENCE WITHOUT COMPLICATION: ICD-10-CM

## 2024-11-11 DIAGNOSIS — J44.9 CHRONIC OBSTRUCTIVE PULMONARY DISEASE, UNSPECIFIED COPD TYPE (HCC): ICD-10-CM

## 2024-11-11 DIAGNOSIS — R19.7 VOMITING AND DIARRHEA: ICD-10-CM

## 2024-11-11 DIAGNOSIS — R06.02 SHORTNESS OF BREATH: ICD-10-CM

## 2024-11-11 DIAGNOSIS — E87.1 HYPONATREMIA: ICD-10-CM

## 2024-11-11 DIAGNOSIS — I48.91 ATRIAL FIBRILLATION, NEW ONSET (HCC): Primary | ICD-10-CM

## 2024-11-11 DIAGNOSIS — J44.1 CHRONIC OBSTRUCTIVE PULMONARY DISEASE WITH ACUTE EXACERBATION (HCC): ICD-10-CM

## 2024-11-11 DIAGNOSIS — J45.909 MILD REACTIVE AIRWAYS DISEASE, UNSPECIFIED WHETHER PERSISTENT: ICD-10-CM

## 2024-11-11 DIAGNOSIS — I48.91 NEW ONSET A-FIB (HCC): ICD-10-CM

## 2024-11-11 DIAGNOSIS — I48.92 ATRIAL FLUTTER BY ELECTROCARDIOGRAM (HCC): Primary | ICD-10-CM

## 2024-11-11 LAB
2HR DELTA HS TROPONIN: 0 NG/L
ALBUMIN SERPL BCG-MCNC: 4.2 G/DL (ref 3.5–5)
ALP SERPL-CCNC: 102 U/L (ref 34–104)
ALT SERPL W P-5'-P-CCNC: 81 U/L (ref 7–52)
ANION GAP SERPL CALCULATED.3IONS-SCNC: 18 MMOL/L (ref 4–13)
AST SERPL W P-5'-P-CCNC: 153 U/L (ref 13–39)
BASOPHILS # BLD AUTO: 0.02 THOUSANDS/ÂΜL (ref 0–0.1)
BASOPHILS NFR BLD AUTO: 0 % (ref 0–1)
BILIRUB SERPL-MCNC: 0.75 MG/DL (ref 0.2–1)
BNP SERPL-MCNC: 78 PG/ML (ref 0–100)
BUN SERPL-MCNC: 7 MG/DL (ref 5–25)
CALCIUM SERPL-MCNC: 8.7 MG/DL (ref 8.4–10.2)
CARDIAC TROPONIN I PNL SERPL HS: 13 NG/L
CARDIAC TROPONIN I PNL SERPL HS: 13 NG/L
CHLORIDE SERPL-SCNC: 96 MMOL/L (ref 96–108)
CO2 SERPL-SCNC: 14 MMOL/L (ref 21–32)
CREAT SERPL-MCNC: 0.79 MG/DL (ref 0.6–1.3)
D DIMER PPP FEU-MCNC: 1.07 UG/ML FEU
EOSINOPHIL # BLD AUTO: 0.02 THOUSAND/ÂΜL (ref 0–0.61)
EOSINOPHIL NFR BLD AUTO: 0 % (ref 0–6)
ERYTHROCYTE [DISTWIDTH] IN BLOOD BY AUTOMATED COUNT: 12.8 % (ref 11.6–15.1)
GFR SERPL CREATININE-BSD FRML MDRD: 81 ML/MIN/1.73SQ M
GLUCOSE SERPL-MCNC: 145 MG/DL (ref 65–140)
HCT VFR BLD AUTO: 40.5 % (ref 34.8–46.1)
HGB BLD-MCNC: 13.8 G/DL (ref 11.5–15.4)
IMM GRANULOCYTES # BLD AUTO: 0.07 THOUSAND/UL (ref 0–0.2)
IMM GRANULOCYTES NFR BLD AUTO: 1 % (ref 0–2)
LYMPHOCYTES # BLD AUTO: 0.94 THOUSANDS/ÂΜL (ref 0.6–4.47)
LYMPHOCYTES NFR BLD AUTO: 6 % (ref 14–44)
MAGNESIUM SERPL-MCNC: 1.5 MG/DL (ref 1.9–2.7)
MCH RBC QN AUTO: 32.3 PG (ref 26.8–34.3)
MCHC RBC AUTO-ENTMCNC: 34.1 G/DL (ref 31.4–37.4)
MCV RBC AUTO: 95 FL (ref 82–98)
MONOCYTES # BLD AUTO: 0.73 THOUSAND/ÂΜL (ref 0.17–1.22)
MONOCYTES NFR BLD AUTO: 5 % (ref 4–12)
NEUTROPHILS # BLD AUTO: 12.86 THOUSANDS/ÂΜL (ref 1.85–7.62)
NEUTS SEG NFR BLD AUTO: 88 % (ref 43–75)
NRBC BLD AUTO-RTO: 0 /100 WBCS
PLATELET # BLD AUTO: 287 THOUSANDS/UL (ref 149–390)
PMV BLD AUTO: 9.7 FL (ref 8.9–12.7)
POTASSIUM SERPL-SCNC: 3.8 MMOL/L (ref 3.5–5.3)
PROCALCITONIN SERPL-MCNC: 0.08 NG/ML
PROT SERPL-MCNC: 7.2 G/DL (ref 6.4–8.4)
RBC # BLD AUTO: 4.27 MILLION/UL (ref 3.81–5.12)
SODIUM SERPL-SCNC: 128 MMOL/L (ref 135–147)
TSH SERPL DL<=0.05 MIU/L-ACNC: 1.98 UIU/ML (ref 0.45–4.5)
WBC # BLD AUTO: 14.64 THOUSAND/UL (ref 4.31–10.16)

## 2024-11-11 PROCEDURE — 80053 COMPREHEN METABOLIC PANEL: CPT | Performed by: EMERGENCY MEDICINE

## 2024-11-11 PROCEDURE — 85379 FIBRIN DEGRADATION QUANT: CPT | Performed by: EMERGENCY MEDICINE

## 2024-11-11 PROCEDURE — 96361 HYDRATE IV INFUSION ADD-ON: CPT

## 2024-11-11 PROCEDURE — 99223 1ST HOSP IP/OBS HIGH 75: CPT

## 2024-11-11 PROCEDURE — 85025 COMPLETE CBC W/AUTO DIFF WBC: CPT | Performed by: EMERGENCY MEDICINE

## 2024-11-11 PROCEDURE — 99291 CRITICAL CARE FIRST HOUR: CPT | Performed by: EMERGENCY MEDICINE

## 2024-11-11 PROCEDURE — 83735 ASSAY OF MAGNESIUM: CPT | Performed by: EMERGENCY MEDICINE

## 2024-11-11 PROCEDURE — 84484 ASSAY OF TROPONIN QUANT: CPT | Performed by: EMERGENCY MEDICINE

## 2024-11-11 PROCEDURE — 84443 ASSAY THYROID STIM HORMONE: CPT | Performed by: EMERGENCY MEDICINE

## 2024-11-11 PROCEDURE — 71045 X-RAY EXAM CHEST 1 VIEW: CPT

## 2024-11-11 PROCEDURE — 87493 C DIFF AMPLIFIED PROBE: CPT | Performed by: EMERGENCY MEDICINE

## 2024-11-11 PROCEDURE — 71275 CT ANGIOGRAPHY CHEST: CPT

## 2024-11-11 PROCEDURE — 99214 OFFICE O/P EST MOD 30 MIN: CPT | Performed by: STUDENT IN AN ORGANIZED HEALTH CARE EDUCATION/TRAINING PROGRAM

## 2024-11-11 PROCEDURE — 93000 ELECTROCARDIOGRAM COMPLETE: CPT | Performed by: STUDENT IN AN ORGANIZED HEALTH CARE EDUCATION/TRAINING PROGRAM

## 2024-11-11 PROCEDURE — 84145 PROCALCITONIN (PCT): CPT

## 2024-11-11 PROCEDURE — 99285 EMERGENCY DEPT VISIT HI MDM: CPT

## 2024-11-11 PROCEDURE — 93005 ELECTROCARDIOGRAM TRACING: CPT

## 2024-11-11 PROCEDURE — 74177 CT ABD & PELVIS W/CONTRAST: CPT

## 2024-11-11 PROCEDURE — 83880 ASSAY OF NATRIURETIC PEPTIDE: CPT | Performed by: EMERGENCY MEDICINE

## 2024-11-11 PROCEDURE — 36415 COLL VENOUS BLD VENIPUNCTURE: CPT | Performed by: EMERGENCY MEDICINE

## 2024-11-11 PROCEDURE — 96360 HYDRATION IV INFUSION INIT: CPT

## 2024-11-11 RX ORDER — ATORVASTATIN CALCIUM 40 MG/1
40 TABLET, FILM COATED ORAL
Status: DISCONTINUED | OUTPATIENT
Start: 2024-11-12 | End: 2024-11-16 | Stop reason: HOSPADM

## 2024-11-11 RX ORDER — LISINOPRIL 20 MG/1
20 TABLET ORAL DAILY
Status: DISCONTINUED | OUTPATIENT
Start: 2024-11-12 | End: 2024-11-16 | Stop reason: HOSPADM

## 2024-11-11 RX ORDER — OLANZAPINE 2.5 MG/1
10 TABLET, FILM COATED ORAL EVERY MORNING
Status: DISCONTINUED | OUTPATIENT
Start: 2024-11-12 | End: 2024-11-16 | Stop reason: HOSPADM

## 2024-11-11 RX ORDER — DILTIAZEM HYDROCHLORIDE 5 MG/ML
15 INJECTION INTRAVENOUS ONCE
Status: DISCONTINUED | OUTPATIENT
Start: 2024-11-11 | End: 2024-11-13

## 2024-11-11 RX ORDER — ACETAMINOPHEN 325 MG/1
650 TABLET ORAL EVERY 6 HOURS PRN
Status: DISCONTINUED | OUTPATIENT
Start: 2024-11-11 | End: 2024-11-16 | Stop reason: HOSPADM

## 2024-11-11 RX ORDER — ONDANSETRON 2 MG/ML
4 INJECTION INTRAMUSCULAR; INTRAVENOUS EVERY 6 HOURS PRN
Status: DISCONTINUED | OUTPATIENT
Start: 2024-11-11 | End: 2024-11-16 | Stop reason: HOSPADM

## 2024-11-11 RX ORDER — SODIUM CHLORIDE 9 MG/ML
100 INJECTION, SOLUTION INTRAVENOUS CONTINUOUS
Status: DISCONTINUED | OUTPATIENT
Start: 2024-11-11 | End: 2024-11-12

## 2024-11-11 RX ORDER — METHYLPREDNISOLONE SODIUM SUCCINATE 40 MG/ML
40 INJECTION, POWDER, LYOPHILIZED, FOR SOLUTION INTRAMUSCULAR; INTRAVENOUS EVERY 8 HOURS
Status: DISCONTINUED | OUTPATIENT
Start: 2024-11-11 | End: 2024-11-14

## 2024-11-11 RX ORDER — LOPERAMIDE HYDROCHLORIDE 2 MG/1
2 CAPSULE ORAL ONCE
Status: COMPLETED | OUTPATIENT
Start: 2024-11-11 | End: 2024-11-11

## 2024-11-11 RX ORDER — DILTIAZEM HYDROCHLORIDE 5 MG/ML
20 INJECTION INTRAVENOUS ONCE
Status: DISCONTINUED | OUTPATIENT
Start: 2024-11-11 | End: 2024-11-11

## 2024-11-11 RX ORDER — GUAIFENESIN 600 MG/1
600 TABLET, EXTENDED RELEASE ORAL 2 TIMES DAILY
Status: DISCONTINUED | OUTPATIENT
Start: 2024-11-11 | End: 2024-11-14

## 2024-11-11 RX ORDER — LEVALBUTEROL INHALATION SOLUTION 1.25 MG/3ML
1.25 SOLUTION RESPIRATORY (INHALATION) EVERY 6 HOURS PRN
Status: DISCONTINUED | OUTPATIENT
Start: 2024-11-11 | End: 2024-11-16 | Stop reason: HOSPADM

## 2024-11-11 RX ORDER — ENOXAPARIN SODIUM 100 MG/ML
1 INJECTION SUBCUTANEOUS ONCE
Status: COMPLETED | OUTPATIENT
Start: 2024-11-11 | End: 2024-11-11

## 2024-11-11 RX ORDER — ROPINIROLE 0.25 MG/1
0.5 TABLET, FILM COATED ORAL
Status: DISCONTINUED | OUTPATIENT
Start: 2024-11-11 | End: 2024-11-16 | Stop reason: HOSPADM

## 2024-11-11 RX ORDER — ENOXAPARIN SODIUM 100 MG/ML
1 INJECTION SUBCUTANEOUS EVERY 12 HOURS SCHEDULED
Status: DISCONTINUED | OUTPATIENT
Start: 2024-11-12 | End: 2024-11-13

## 2024-11-11 RX ORDER — SODIUM CHLORIDE FOR INHALATION 0.9 %
3 VIAL, NEBULIZER (ML) INHALATION EVERY 6 HOURS PRN
Status: DISCONTINUED | OUTPATIENT
Start: 2024-11-11 | End: 2024-11-16 | Stop reason: HOSPADM

## 2024-11-11 RX ORDER — AZITHROMYCIN 250 MG/1
500 TABLET, FILM COATED ORAL EVERY 24 HOURS
Status: COMPLETED | OUTPATIENT
Start: 2024-11-11 | End: 2024-11-13

## 2024-11-11 RX ADMIN — IOHEXOL 70 ML: 350 INJECTION, SOLUTION INTRAVENOUS at 22:49

## 2024-11-11 RX ADMIN — LOPERAMIDE HYDROCHLORIDE 2 MG: 2 CAPSULE ORAL at 21:57

## 2024-11-11 RX ADMIN — SODIUM CHLORIDE 100 ML/HR: 0.9 INJECTION, SOLUTION INTRAVENOUS at 22:54

## 2024-11-11 RX ADMIN — AZITHROMYCIN DIHYDRATE 500 MG: 250 TABLET ORAL at 22:56

## 2024-11-11 RX ADMIN — GUAIFENESIN 600 MG: 600 TABLET, EXTENDED RELEASE ORAL at 22:57

## 2024-11-11 RX ADMIN — SODIUM CHLORIDE 1000 ML: 0.9 INJECTION, SOLUTION INTRAVENOUS at 19:20

## 2024-11-11 RX ADMIN — ENOXAPARIN SODIUM 70 MG: 80 INJECTION SUBCUTANEOUS at 21:37

## 2024-11-11 RX ADMIN — IOHEXOL 100 ML: 350 INJECTION, SOLUTION INTRAVENOUS at 19:53

## 2024-11-11 NOTE — ED PROVIDER NOTES
Time reflects when diagnosis was documented in both MDM as applicable and the Disposition within this note       Time User Action Codes Description Comment    11/11/2024  7:17 PM Oyesanmi, Olubusola O Add [I48.91] Atrial fibrillation, new onset (HCC)     11/11/2024  7:17 PM Oyesanmi, Olubusola O Add [E87.1] Hyponatremia     11/11/2024  7:17 PM Oyesanmi, Olubusola O Add [R11.10,  R19.7] Vomiting and diarrhea     11/11/2024  9:43 PM Oyesanmi, Olubusola O Add [R19.7] Diarrhea           ED Disposition       ED Disposition   Admit    Condition   Stable    Date/Time   Mon Nov 11, 2024  9:25 PM    Comment   Case was discussed with Rosamaria ZAIDI and the patient's admission status was agreed to be Admission Status: inpatient status to the service of Dr. Petty.               Assessment & Plan       Medical Decision Making  59-year-old female in the ED with paroxysmal A-fib with RVR and diarrhea.  Labs, CT abdomen pelvis, EKG ordered and reviewed. CT abdomen pelvis was performed prior to D-dimer being resulted.  Radiology approval was given to perform CTA chest to evaluate for PE.  Patient with paroxysmal A-fib with RVR in the ED. [id number] score is 2.  Patient will be started on a course of Lovenox and admitted to hospitalist.    Amount and/or Complexity of Data Reviewed  Labs: ordered.  Radiology: ordered and independent interpretation performed.    Risk  Prescription drug management.  Decision regarding hospitalization.             Medications   FLUoxetine (PROzac) capsule 20 mg (has no administration in time range)   lisinopril (ZESTRIL) tablet 20 mg (has no administration in time range)   OLANZapine (ZyPREXA) tablet 10 mg (has no administration in time range)   rOPINIRole (REQUIP) tablet 0.5 mg (has no administration in time range)   atorvastatin (LIPITOR) tablet 40 mg (has no administration in time range)   nicotine (NICODERM CQ) 7 mg/24hr TD 24 hr patch 1 patch (has no administration in time range)   acetaminophen  (TYLENOL) tablet 650 mg (has no administration in time range)   ondansetron (ZOFRAN) injection 4 mg (has no administration in time range)   guaiFENesin (MUCINEX) 12 hr tablet 600 mg (has no administration in time range)   levalbuterol (XOPENEX) inhalation solution 1.25 mg (has no administration in time range)     And   sodium chloride 0.9 % inhalation solution 3 mL (has no administration in time range)   methylPREDNISolone sodium succinate (Solu-MEDROL) injection 40 mg (has no administration in time range)   azithromycin (ZITHROMAX) tablet 500 mg (has no administration in time range)   sodium chloride 0.9 % bolus 1,000 mL (0 mL Intravenous Stopped 11/11/24 2242)   iohexol (OMNIPAQUE) 350 MG/ML injection (MULTI-DOSE) 100 mL (100 mL Intravenous Given 11/11/24 1953)   enoxaparin (LOVENOX) subcutaneous injection 70 mg (70 mg Subcutaneous Given 11/11/24 2137)   loperamide (IMODIUM) capsule 2 mg (2 mg Oral Given 11/11/24 2157)       ED Risk Strat Scores           [id number] SCORE      Flowsheet Row Most Recent Value   [id number]    Age 0 Filed at: 11/11/2024 1909   Sex 1 Filed at: 11/11/2024 1909   CHF History 0 Filed at: 11/11/2024 1909   HTN History 1 Filed at: 11/11/2024 1909   Stroke or TIA Symptoms Previously 0 Filed at: 11/11/2024 1909   Vascular Disease History 0 Filed at: 11/11/2024 1909   Diabetes History 0 Filed at: 11/11/2024 1909   [id number] Score 2 Filed at: 11/11/2024 1909             CIWA-Ar Score       Row Name 11/13/24 13:11:14 11/13/24 08:18:20 11/13/24 07:46:35       CIWA-Ar    Nausea and Vomiting 0 0 0    Tactile Disturbances 0 0 0    Tremor 0 0 0    Auditory Disturbances 0 0 0    Paroxysmal Sweats 0 0 0    Visual Disturbances 0 0 0    Anxiety 0 0 0    Headache, Fullness in Head 0 0 0    Agitation 0 0 0    Orientation and Clouding of Sensorium 0 0 0    CIWA-Ar Total 0 0 0      Row Name 11/13/24 0220 11/12/24 22:05:31 11/12/24 1903       CIWA-Ar    Pulse 62 -- --    Nausea and Vomiting 0 0 0     Tactile Disturbances 0 0 0    Tremor 0 0 0    Auditory Disturbances 0 0 0    Paroxysmal Sweats 0 0 0    Visual Disturbances 0 0 0    Anxiety 0 0 0    Headache, Fullness in Head 0 0 0    Agitation 0 0 0    Orientation and Clouding of Sensorium 0 0 0    CIWA-Ar Total 0 0 0      Row Name 11/12/24 1458 11/12/24 0823 11/12/24 04:56:30       CIWA-Ar    Nausea and Vomiting 0 0 0    Tactile Disturbances 0 0 0    Tremor 2 3 0    Auditory Disturbances 0 0 0    Paroxysmal Sweats 1 0 0    Visual Disturbances 0 0 0    Anxiety 0 1 0    Headache, Fullness in Head 0 0 0    Agitation 0 0 0    Orientation and Clouding of Sensorium 0 0 0    CIWA-Ar Total 3 4 0                                                History of Present Illness       Chief Complaint   Patient presents with    Rapid Heart Rate     Patient states she started this morning with diarrhea, vomiting, dizziness, and SOB.  Went to PMD and was found to have a HR ranging .  Patient denies any pain.       Past Medical History:   Diagnosis Date    Anxiety     COPD (chronic obstructive pulmonary disease) ([facility])     COPD exacerbation (HCC) 8/12/2016    Dental abscess 10/2020    Depression     ETOH abuse     Facial abscess 8/12/2016      History reviewed. No pertinent surgical history.   Family History   Problem Relation Age of Onset    Brain cancer Maternal Aunt     Cancer Other     Substance Abuse Neg Hx     Mental illness Neg Hx       Social History     Tobacco Use    Smoking status: Every Day     Current packs/day: 0.25     Types: Cigarettes    Smokeless tobacco: Never    Tobacco comments:     smokes 1 pack in 3 days   Vaping Use    Vaping status: Never Used   Substance Use Topics    Alcohol use: Not Currently     Comment: states drank today had some beer a couple hours ago    Drug use: No      E-Cigarette/Vaping    E-Cigarette Use Never User       E-Cigarette/Vaping Substances    Nicotine No     THC No     CBD No     Flavoring No     Other No     Unknown No       I  have reviewed and agree with the history as documented.     Patient is a 59-year-old female with a history of COPD, alcohol abuse, anxiety, depression presents emergency department with complaint of worsening shortness of breath.  Patient presented to the local urgent care, was found to be in a flutter with rapid ventricular rate, and sent to the ED by EMS.  On arrival, patient no longer in atrial flutter, she was in sinus tachycardia at 103.  She denies chest pain or pressure.  Patient with pursed lip breathing, noted on initial evaluation, which she states is her baseline.  She also complains of worsening vomiting and diarrhea x 1 day.  Patient states that she has had 5 episodes of vomiting.  She denies abdominal pain or sick contacts.      History provided by:  Patient  History limited by:  Acuity of condition   used: No        Review of Systems   Constitutional:  Negative for chills and fever.   Respiratory:  Negative for cough, chest tightness and shortness of breath.    Gastrointestinal:  Positive for diarrhea, nausea and vomiting. Negative for abdominal pain.   Genitourinary:  Negative for dysuria, frequency, hematuria and urgency.   Musculoskeletal:  Negative for back pain, neck pain and neck stiffness.   Psychiatric/Behavioral:  The patient is nervous/anxious.    All other systems reviewed and are negative.          Objective       ED Triage Vitals [11/11/24 1658]   Temperature Pulse Blood Pressure Respirations SpO2 Patient Position - Orthostatic VS   98.2 °F (36.8 °C) 90 111/83 19 99 % Sitting      Temp Source Heart Rate Source BP Location FiO2 (%) Pain Score    Tympanic Monitor Right arm -- No Pain      Vitals      Date and Time Temp Pulse SpO2 Resp BP Pain Score FACES Pain Rating User   11/13/24 1311 -- 68 96 % -- 108/74 -- -- DII   11/13/24 0900 -- -- 97 % -- -- No Pain -- NJ   11/13/24 0818 -- -- -- 21 -- -- -- NJ   11/13/24 0818 99 °F (37.2 °C) 118 94 % -- 137/90 -- -- DII    11/13/24 0746 -- 109 93 % -- 136/94 -- -- DII   11/13/24 0608 99.5 °F (37.5 °C) 126 88 % -- 129/86 -- -- DII   11/13/24 0220 -- 62 -- -- -- -- -- SA   11/13/24 0220 -- -- -- -- 106/58 -- -- DII   11/12/24 2205 98 °F (36.7 °C) 84 93 % -- 122/78 -- -- DII   11/12/24 2021 98 °F (36.7 °C) 92 92 % -- 121/78 -- -- DII   11/12/24 2000 -- -- -- -- -- No Pain -- SA   11/12/24 1922 -- -- 93 % -- -- -- -- SP   11/12/24 1903 99 °F (37.2 °C) 86 92 % 20 122/79 -- -- DIAZ   11/12/24 1902 99 °F (37.2 °C) 85 93 % -- 122/79 -- -- DII   11/12/24 1803 98.1 °F (36.7 °C) 98 95 % 20 125/87 -- -- DII   11/12/24 1614 -- -- 93 % -- -- -- -- SP   11/12/24 1516 98.4 °F (36.9 °C) 83 93 % 19 134/91 -- -- DII   11/12/24 1458 99.4 °F (37.4 °C) 84 96 % 18 132/88 No Pain -- DIAZ   11/12/24 1457 99.4 °F (37.4 °C) 82 94 % -- 132/88 -- -- DII   11/12/24 1456 -- 85 95 % -- 132/88 -- -- DII   11/12/24 1427 -- -- 96 % -- -- -- -- SP   11/12/24 1005 -- -- -- -- -- No Pain -- KLS   11/12/24 0920 -- 84 -- -- 133/88 -- -- LH   11/12/24 0823 97.2 °F (36.2 °C) 84 96 % 18 133/88 No Pain -- DIAZ   11/12/24 0822 97.2 °F (36.2 °C) 84 94 % -- 133/88 -- -- DII   11/12/24 0456 98 °F (36.7 °C) 83 95 % -- 134/89 -- -- DII   11/12/24 0334 -- -- -- 18 -- -- -- SRP   11/12/24 0053 -- 83 95 % 18 131/91 -- -- DII   11/12/24 0003 -- -- 95 % -- -- -- -- SA   11/11/24 2345 -- -- -- -- -- No Pain -- SA   11/11/24 2322 96.9 °F (36.1 °C) 83 95 % 18 128/95 -- -- DII   11/11/24 2321 -- -- -- -- -- No Pain -- SA   11/11/24 2130 -- 85 95 % 26 148/111 -- -- MB   11/11/24 2100 -- 83 97 % 26 145/71 -- -- MB   11/11/24 1930 -- 83 97 % 26 156/84 -- -- MB   11/11/24 1915 -- 81 97 % 26 -- -- -- JG   11/11/24 1900 -- 85 98 % 19 148/71 -- -- MB   11/11/24 1830 -- 86 98 % 19 139/64 -- -- MB   11/11/24 1745 -- 136 98 % 19 134/64 -- -- MB   11/11/24 1700 -- 84 99 % 19 147/74 -- -- MB   11/11/24 1658 98.2 °F (36.8 °C) 90 99 % 19 111/83 No Pain -- AC            Physical Exam  Vitals and nursing note  reviewed.   Constitutional:       General: She is not in acute distress.     Appearance: She is well-developed. She is not diaphoretic.   HENT:      Head: Normocephalic and atraumatic.   Eyes:      Conjunctiva/sclera: Conjunctivae normal.      Pupils: Pupils are equal, round, and reactive to light.   Cardiovascular:      Rate and Rhythm: Tachycardia present. Rhythm irregular.      Heart sounds: Normal heart sounds. No murmur heard.  Pulmonary:      Effort: Pulmonary effort is normal. No respiratory distress.      Breath sounds: Normal breath sounds.   Abdominal:      General: Bowel sounds are normal. There is no distension.      Palpations: Abdomen is soft.      Tenderness: There is no abdominal tenderness.   Musculoskeletal:         General: No deformity. Normal range of motion.      Cervical back: Normal range of motion and neck supple.   Skin:     General: Skin is warm and dry.      Capillary Refill: Capillary refill takes less than 2 seconds.      Coloration: Skin is not pale.      Findings: No rash.   Neurological:      General: No focal deficit present.      Mental Status: She is alert and oriented to person, place, and time.      Cranial Nerves: No cranial nerve deficit.   Psychiatric:         Mood and Affect: Mood is anxious.         Behavior: Behavior normal.       Results Reviewed       Procedure Component Value Units Date/Time    Clostridium difficile toxin by PCR with EIA [[id number]]  (Normal) Collected: 11/11/24 2238    Lab Status: Final result Specimen: Stool from Per Rectum Updated: 11/12/24 1427     C.difficile toxin by PCR Negative    Narrative:      This test has been performed using either the FDA-approved [employer] MAX system or the FDA-approved [employer] system for the qualitative detection of Clostridioides difficile toxin B gene (tcdB) in human liquid or soft stool specimens from patients suspected of having Clostridioides difficile infection using polymerase chain reaction (PCR)  methodology.    Negative PCR results do not preclude infection and should not be used as the sole basis for treatment or other patient management decisions.    Positive PCR results are indicative of colonization or infection, but do not rule out co-infection with other bacteria or viruses.    As with all polymerase-chain reaction methodology, extremely low levels of target below the limit of detection may be detected, but results may not be reproducible.    All positive results are reflexed to a toxin A & B enzyme immunoassay (EIA) to distinguish between active infection and colonization.  Positive EIA results are indicative of an active infection.  Negative EIA results are indicative of colonization without active injection.    All test results should be used as an adjunct to clinical observations and other information available to the provider.    CBC and differential [[id number]]  (Abnormal) Collected: 11/12/24 0450    Lab Status: Final result Specimen: Blood from Arm, Right Updated: 11/12/24 0637     WBC 11.85 Thousand/uL      RBC 3.94 Million/uL      Hemoglobin 13.0 g/dL      Hematocrit 38.0 %      MCV 96 fL      MCH 33.0 pg      MCHC 34.2 g/dL      RDW 12.6 %      MPV 9.8 fL      Platelets 266 Thousands/uL      nRBC 0 /100 WBCs      Segmented % 95 %      Immature Grans % 0 %      Lymphocytes % 4 %      Monocytes % 1 %      Eosinophils Relative 0 %      Basophils Relative 0 %      Absolute Neutrophils 11.25 Thousands/µL      Absolute Immature Grans 0.04 Thousand/uL      Absolute Lymphocytes 0.43 Thousands/µL      Absolute Monocytes 0.12 Thousand/µL      Eosinophils Absolute 0.00 Thousand/µL      Basophils Absolute 0.01 Thousands/µL     Narrative:      This is an appended report.  These results have been appended to a previously verified report.    Basic metabolic panel [[id number]]  (Abnormal) Collected: 11/12/24 0450    Lab Status: Final result Specimen: Blood from Arm, Right Updated: 11/12/24 0551     Sodium  135 mmol/L      Potassium 3.6 mmol/L      Chloride 105 mmol/L      CO2 18 mmol/L      ANION GAP 12 mmol/L      BUN 4 mg/dL      Creatinine 0.64 mg/dL      Glucose 139 mg/dL      Calcium 8.7 mg/dL      eGFR 96 ml/min/1.73sq m     Narrative:      National Kidney Disease Foundation guidelines for Chronic Kidney Disease (CKD):     Stage 1 with normal or high GFR (GFR > 90 mL/min/1.73 square meters)    Stage 2 Mild CKD (GFR = 60-89 mL/min/1.73 square meters)    Stage 3A Moderate CKD (GFR = 45-59 mL/min/1.73 square meters)    Stage 3B Moderate CKD (GFR = 30-44 mL/min/1.73 square meters)    Stage 4 Severe CKD (GFR = 15-29 mL/min/1.73 square meters)    Stage 5 End Stage CKD (GFR <15 mL/min/1.73 square meters)  Note: GFR calculation is accurate only with a steady state creatinine    Magnesium [[id number]]  (Abnormal) Collected: 11/12/24 0450    Lab Status: Final result Specimen: Blood from Arm, Right Updated: 11/12/24 0551     Magnesium 2.8 mg/dL     TSH, 3rd generation with Free T4 reflex [[id number]]  (Normal) Collected: 11/11/24 2245    Lab Status: Final result Specimen: Blood from Arm, Left Updated: 11/11/24 2327     TSH 3RD GENERATON 1.981 uIU/mL     Procalcitonin [[id number]]  (Normal) Collected: 11/11/24 2245    Lab Status: Final result Specimen: Blood from Arm, Left Updated: 11/11/24 2321     Procalcitonin 0.08 ng/ml     D-Dimer [[id number]]  (Abnormal) Collected: 11/11/24 1919    Lab Status: Final result Specimen: Blood from Arm, Left Updated: 11/11/24 2002     D-Dimer, Quant 1.07 ug/ml FEU     Narrative:      In the evaluation for possible pulmonary embolism, in the appropriate (Well's Score of 4 or less) patient, the age adjusted d-dimer cutoff for this patient can be calculated as:    Age x 0.01 (in ug/mL) for Age-adjusted D-dimer exclusion threshold for a patient over 50 years.    HS Troponin I 2hr [[id number]]  (Normal) Collected: 11/11/24 1919    Lab Status: Final result Specimen: Blood from Arm, Left Updated:  11/11/24 1950     hs TnI 2hr 13 ng/L      Delta 2hr hsTnI 0 ng/L     Stool Enteric Bacterial Panel by PCR [[id number]]     Lab Status: No result Specimen: Stool     Aeromonas/Pleisiomonas Stool Culture [[id number]]     Lab Status: No result Specimen: Stool     HS Troponin 0hr (reflex protocol) [[id number]]  (Normal) Collected: 11/11/24 1705    Lab Status: Final result Specimen: Blood from Arm, Left Updated: 11/11/24 1734     hs TnI 0hr 13 ng/L     B-Type Natriuretic Peptide(BNP) [[id number]]  (Normal) Collected: 11/11/24 1705    Lab Status: Final result Specimen: Blood from Arm, Left Updated: 11/11/24 1734     BNP 78 pg/mL     Comprehensive metabolic panel [[id number]]  (Abnormal) Collected: 11/11/24 1705    Lab Status: Final result Specimen: Blood from Arm, Left Updated: 11/11/24 1726     Sodium 128 mmol/L      Potassium 3.8 mmol/L      Chloride 96 mmol/L      CO2 14 mmol/L      ANION GAP 18 mmol/L      BUN 7 mg/dL      Creatinine 0.79 mg/dL      Glucose 145 mg/dL      Calcium 8.7 mg/dL       U/L      ALT 81 U/L      Alkaline Phosphatase 102 U/L      Total Protein 7.2 g/dL      Albumin 4.2 g/dL      Total Bilirubin 0.75 mg/dL      eGFR 81 ml/min/1.73sq m     Narrative:      National Kidney Disease Foundation guidelines for Chronic Kidney Disease (CKD):     Stage 1 with normal or high GFR (GFR > 90 mL/min/1.73 square meters)    Stage 2 Mild CKD (GFR = 60-89 mL/min/1.73 square meters)    Stage 3A Moderate CKD (GFR = 45-59 mL/min/1.73 square meters)    Stage 3B Moderate CKD (GFR = 30-44 mL/min/1.73 square meters)    Stage 4 Severe CKD (GFR = 15-29 mL/min/1.73 square meters)    Stage 5 End Stage CKD (GFR <15 mL/min/1.73 square meters)  Note: GFR calculation is accurate only with a steady state creatinine    Magnesium [[id number]]  (Abnormal) Collected: 11/11/24 1705    Lab Status: Final result Specimen: Blood from Arm, Left Updated: 11/11/24 1726     Magnesium 1.5 mg/dL     CBC and differential [[id number]]  (Abnormal)  Collected: 11/11/24 1705    Lab Status: Final result Specimen: Blood from Arm, Left Updated: 11/11/24 1710     WBC 14.64 Thousand/uL      RBC 4.27 Million/uL      Hemoglobin 13.8 g/dL      Hematocrit 40.5 %      MCV 95 fL      MCH 32.3 pg      MCHC 34.1 g/dL      RDW 12.8 %      MPV 9.7 fL      Platelets 287 Thousands/uL      nRBC 0 /100 WBCs      Segmented % 88 %      Immature Grans % 1 %      Lymphocytes % 6 %      Monocytes % 5 %      Eosinophils Relative 0 %      Basophils Relative 0 %      Absolute Neutrophils 12.86 Thousands/µL      Absolute Immature Grans 0.07 Thousand/uL      Absolute Lymphocytes 0.94 Thousands/µL      Absolute Monocytes 0.73 Thousand/µL      Eosinophils Absolute 0.02 Thousand/µL      Basophils Absolute 0.02 Thousands/µL             CTA chest pe study   Final Interpretation by Hamlet Lawrence MD (11/11 2336)      No evidence of acute pulmonary embolus, thoracic aortic aneurysm or dissection. No acute cardiopulmonary process.                  Workstation performed: [id number]         CT abdomen pelvis with contrast   Final Interpretation by Mohsen Dial MD (11/11 2116)      No acute inflammatory process identified in the abdomen or pelvis.      Hepatic steatosis.      Stable 3.4 cm left adrenal gland nodule.      Colonic diverticulosis without evidence of diverticulitis.               Workstation performed: [id number]         XR chest 1 view portable   ED Interpretation by Marsha Anton DO (11/11 1843)   nad      Final Interpretation by Gurjit Braden MD (11/11 1845)      No acute cardiopulmonary disease.            Workstation performed: [id number]             ECG 12 Lead Documentation Only    Date/Time: 11/11/2024 5:00 PM    Performed by: Marsha Anton DO  Authorized by: Marsha Anton DO    Indications / Diagnosis:  Sob  ECG reviewed by me, the ED Provider: yes    Patient location:  ED  Previous ECG:     Previous ECG:  Unavailable    Comparison to cardiac monitor:  Yes    Interpretation:     Interpretation: non-specific    Rate:     ECG rate:  103bpm    ECG rate assessment: tachycardic    Rhythm:     Rhythm: sinus tachycardia    Ectopy:     Ectopy: PVCs      PVCs:  Frequent  QRS:     QRS axis:  Normal  Conduction:     Conduction: normal    ST segments:     ST segments:  Non-specific  ECG 12 Lead Documentation Only    Date/Time: 11/11/2024 5:50 PM    Performed by: Marsha Anton DO  Authorized by: Marsha Anton DO    Indications / Diagnosis:  Sob  ECG reviewed by me, the ED Provider: yes    Patient location:  ED  Previous ECG:     Previous ECG:  Compared to current    Similarity:  Changes noted    Comparison to cardiac monitor: Yes    Interpretation:     Interpretation: abnormal    Rate:     ECG rate:  134bpm    ECG rate assessment: tachycardic    Rhythm:     Rhythm: atrial fibrillation    Ectopy:     Ectopy: none    QRS:     QRS axis:  Normal  Conduction:     Conduction: normal    ST segments:     ST segments:  Abnormal    Depression:  V4, V5 and V6  T waves:     T waves: normal    ECG 12 Lead Documentation Only    Date/Time: 11/11/2024 6:55 PM    Performed by: Marsha Anton DO  Authorized by: Marsha Anton DO    Indications / Diagnosis:  Sob  ECG reviewed by me, the ED Provider: yes    Patient location:  ED  Previous ECG:     Previous ECG:  Compared to current    Similarity:  Changes noted    Comparison to cardiac monitor: Yes    Interpretation:     Interpretation: normal    Rate:     ECG rate:  87bpm    ECG rate assessment: normal    Rhythm:     Rhythm: sinus rhythm    Ectopy:     Ectopy: none    QRS:     QRS axis:  Normal  Conduction:     Conduction: normal    ST segments:     ST segments:  Normal  T waves:     T waves: normal    CriticalCare Time    Date/Time: 11/11/2024 6:00 PM    Performed by: Marsha Anton DO  Authorized by: Marsha Anton DO    Critical care provider statement:     Critical care time (minutes):  35     Critical care time was exclusive of:  Separately billable procedures and treating other patients and teaching time    Critical care was necessary to treat or prevent imminent or life-threatening deterioration of the following conditions:  Circulatory failure    Critical care was time spent personally by me on the following activities:  Blood draw for specimens, obtaining history from patient or surrogate, development of treatment plan with patient or surrogate, evaluation of patient's response to treatment, examination of patient, interpretation of cardiac output measurements, ordering and performing treatments and interventions, ordering and review of laboratory studies, ordering and review of radiographic studies and re-evaluation of patient's condition    I assumed direction of critical care for this patient from another provider in my specialty: no        ED Medication and Procedure Management   Prior to Admission Medications   Prescriptions Last Dose Informant Patient Reported? Taking?   FLUoxetine (PROzac) 20 mg capsule Not Taking Self No No   Sig: Take 1 capsule (20 mg total) by mouth daily   Patient not taking: Reported on 11/11/2024   OLANZapine (ZyPREXA) 10 mg tablet   No No   Sig: Take 1 tablet (10 mg total) by mouth every morning   QUEtiapine (SEROquel) 100 mg tablet   No No   Sig: Take 2 tablets (200 mg total) by mouth daily at bedtime   albuterol (PROVENTIL HFA,VENTOLIN HFA) 90 mcg/act inhaler Not Taking Self No No   Sig: Inhale 2 puffs every 6 (six) hours as needed for wheezing for up to 14 doses   Patient not taking: Reported on 11/11/2024   ergocalciferol (VITAMIN D2) 50,000 units Past Week  No Yes   Sig: Take 1 capsule (50,000 Units total) by mouth once a week   lisinopril (ZESTRIL) 20 mg tablet 11/11/2024 at 0900  No Yes   Sig: TAKE 1 TABLET (20 MG TOTAL) BY MOUTH DAILY   rOPINIRole (REQUIP) 0.5 mg tablet Unknown  No No   Sig: Take 1 tablet (0.5 mg total) by mouth daily at bedtime   rosuvastatin  (CRESTOR) 20 MG tablet 11/11/2024 at 0900  No Yes   Sig: TAKE 1 TABLET (20 MG TOTAL) BY MOUTH DAILY      Facility-Administered Medications: None     Current Discharge Medication List        CONTINUE these medications which have NOT CHANGED    Details   ergocalciferol (VITAMIN D2) 50,000 units Take 1 capsule (50,000 Units total) by mouth once a week  Qty: 12 capsule, Refills: 3    Associated Diagnoses: Vitamin D deficiency      lisinopril (ZESTRIL) 20 mg tablet TAKE 1 TABLET (20 MG TOTAL) BY MOUTH DAILY  Qty: 90 tablet, Refills: 1    Associated Diagnoses: Essential hypertension      rosuvastatin (CRESTOR) 20 MG tablet TAKE 1 TABLET (20 MG TOTAL) BY MOUTH DAILY  Qty: 90 tablet, Refills: 1    Associated Diagnoses: Mixed hyperlipidemia      albuterol (PROVENTIL HFA,VENTOLIN HFA) 90 mcg/act inhaler Inhale 2 puffs every 6 (six) hours as needed for wheezing for up to 14 doses  Qty: 6.7 g, Refills: 0    Comments: Substitution to a formulary equivalent within the same pharmaceutical class is authorized.  Associated Diagnoses: Chronic obstructive pulmonary disease, unspecified COPD type (HCC); Mild reactive airways disease, unspecified whether persistent      FLUoxetine (PROzac) 20 mg capsule Take 1 capsule (20 mg total) by mouth daily  Qty: 30 capsule, Refills: 0    Associated Diagnoses: Major depressive disorder, remission status unspecified, unspecified whether recurrent      OLANZapine (ZyPREXA) 10 mg tablet Take 1 tablet (10 mg total) by mouth every morning  Qty: 30 tablet, Refills: 0    Associated Diagnoses: Major depressive disorder, remission status unspecified, unspecified whether recurrent      QUEtiapine (SEROquel) 100 mg tablet Take 2 tablets (200 mg total) by mouth daily at bedtime  Qty: 60 tablet, Refills: 0    Associated Diagnoses: Major depressive disorder, remission status unspecified, unspecified whether recurrent      rOPINIRole (REQUIP) 0.5 mg tablet Take 1 tablet (0.5 mg total) by mouth daily at  bedtime  Qty: 30 tablet, Refills: 0    Associated Diagnoses: Major depressive disorder, remission status unspecified, unspecified whether recurrent           No discharge procedures on file.  ED SEPSIS DOCUMENTATION   Time reflects when diagnosis was documented in both MDM as applicable and the Disposition within this note       Time User Action Codes Description Comment    11/11/2024  7:17 PM Oyesanmi, Olubusola O Add [I48.91] Atrial fibrillation, new onset (HCC)     11/11/2024  7:17 PM Oyesanmi, Olubusola O Add [E87.1] Hyponatremia     11/11/2024  7:17 PM Oyesanmi, Olubusola O Add [R11.10,  R19.7] Vomiting and diarrhea     11/11/2024  9:43 PM Oyesanmi, Olubusola O Add [R19.7] Diarrhea                  Olubusola O Oyesanmi, DO  11/13/24 1558

## 2024-11-11 NOTE — PROGRESS NOTES
"  [facility]        NAME: Sophie Jamil is a 59 y.o. female  : 1965    MRN: [id number]  DATE: 2024  TIME: 4:27 PM    Assessment and Orders   Atrial flutter by electrocardiogram ([facility]) [I48.92]  1. Atrial flutter by electrocardiogram ([facility])  POCT ECG      2. Shortness of breath  POCT ECG            Plan and Discussion      Patient EKG reveals atrial flutter with HR in the office fluctuating in the 130s and 150s. Feels short of breath fur O2 is 99%.  Given oxygen via nasal canula as she was having visible increased work of breathing. EMS called and patient transferred to ED for evaluation and treatment.     Risks and benefits discussed. Patient understands and agrees with the plan.     PATIENT INSTRUCTIONS      If tests have been performed at [facility], our office will contact you with results if changes need to be made to the care plan discussed with you at the visit.  [facility]    Follow up with PCP.     If any of the following occur, please report to your nearest ED for evaluation or call 911.   Difficultly breathing or shortness of breath  Chest pain  Acutely worsening symptoms.         Chief Complaint     Chief Complaint   Patient presents with    Diarrhea     C/O diarrhea that started this morning. \"Ten bowel movements since this morning.\" BM watery. Vomiting x5 since this morning.  Took Peptobismul.         History of Present Illness       Has COPD, but states she feels more SOB now. Denies cardiac history.     Diarrhea   This is a new problem. The current episode started today. The problem occurs more than 10 times per day. The problem has been unchanged. Associated symptoms include vomiting. Pertinent negatives include no fever.       Review of Systems   Review of Systems   Constitutional:  Negative for fever.   Gastrointestinal:  Positive for diarrhea and vomiting.         Current Medications       Current Outpatient Medications:     " ergocalciferol (VITAMIN D2) 50,000 units, Take 1 capsule (50,000 Units total) by mouth once a week, Disp: 12 capsule, Rfl: 3    FLUoxetine (PROzac) 20 mg capsule, Take 1 capsule (20 mg total) by mouth daily (Patient taking differently: Take 20 mg by mouth daily Patient states she takes 3 capsules daily), Disp: 30 capsule, Rfl: 0    lisinopril (ZESTRIL) 20 mg tablet, TAKE 1 TABLET (20 MG TOTAL) BY MOUTH DAILY, Disp: 90 tablet, Rfl: 1    rOPINIRole (REQUIP) 0.5 mg tablet, Take 1 tablet (0.5 mg total) by mouth daily at bedtime, Disp: 30 tablet, Rfl: 0    rosuvastatin (CRESTOR) 20 MG tablet, TAKE 1 TABLET (20 MG TOTAL) BY MOUTH DAILY, Disp: 90 tablet, Rfl: 1    albuterol (PROVENTIL HFA,VENTOLIN HFA) 90 mcg/act inhaler, Inhale 2 puffs every 6 (six) hours as needed for wheezing for up to 14 doses (Patient not taking: Reported on 11/11/2024), Disp: 6.7 g, Rfl: 0    OLANZapine (ZyPREXA) 10 mg tablet, Take 1 tablet (10 mg total) by mouth every morning, Disp: 30 tablet, Rfl: 0    QUEtiapine (SEROquel) 100 mg tablet, Take 2 tablets (200 mg total) by mouth daily at bedtime, Disp: 60 tablet, Rfl: 0    Current Allergies     Allergies as of 11/11/2024 - Reviewed 11/11/2024   Allergen Reaction Noted    Bee venom  11/17/2020            The following portions of the patient's history were reviewed and updated as appropriate: allergies, current medications, past family history, past medical history, past social history, past surgical history and problem list.     Past Medical History:   Diagnosis Date    Anxiety     COPD (chronic obstructive pulmonary disease) (HCC)     COPD exacerbation (HCC) 8/12/2016    Dental abscess 10/2020    Depression     ETOH abuse     Facial abscess 8/12/2016       History reviewed. No pertinent surgical history.    Family History   Problem Relation Age of Onset    Brain cancer Maternal Aunt     Cancer Other     Substance Abuse Neg Hx     Mental illness Neg Hx          Medications have been  verified.        Objective   /70   Pulse (!) 138   Temp 98.1 °F (36.7 °C)   Resp 20   SpO2 99%   No LMP recorded. Patient is postmenopausal.       Physical Exam     Physical Exam  Constitutional:       Appearance: She is ill-appearing (increased work of breathing).   Cardiovascular:      Rate and Rhythm: Tachycardia present.   Pulmonary:      Effort: Respiratory distress present.      Breath sounds: Wheezing and rhonchi present.   Abdominal:      General: Abdomen is flat. There is no distension.      Tenderness: There is no abdominal tenderness.   Neurological:      General: No focal deficit present.      Mental Status: She is alert and oriented to person, place, and time.   Psychiatric:         Mood and Affect: Mood normal.               Rosamaria Mancilla DO

## 2024-11-11 NOTE — Clinical Note
Case was discussed with Rosamaria ZAIDI and the patient's admission status was agreed to be Admission Status: observation status to the service of Dr. Petty.  
None at this time

## 2024-11-12 ENCOUNTER — APPOINTMENT (INPATIENT)
Dept: NON INVASIVE DIAGNOSTICS | Facility: HOSPITAL | Age: 59
DRG: 140 | End: 2024-11-12
Payer: COMMERCIAL

## 2024-11-12 PROBLEM — E83.42 HYPOMAGNESEMIA: Status: ACTIVE | Noted: 2024-11-12

## 2024-11-12 PROBLEM — R19.7 DIARRHEA: Status: ACTIVE | Noted: 2024-11-12

## 2024-11-12 PROBLEM — E87.29 METABOLIC ACIDOSIS, INCREASED ANION GAP (IAG): Status: ACTIVE | Noted: 2024-11-12

## 2024-11-12 PROBLEM — I48.91 NEW ONSET A-FIB (HCC): Status: ACTIVE | Noted: 2024-11-12

## 2024-11-12 PROBLEM — E87.8 ELECTROLYTE DISTURBANCE: Status: ACTIVE | Noted: 2024-11-12

## 2024-11-12 PROBLEM — E87.1 HYPONATREMIA: Status: ACTIVE | Noted: 2024-11-12

## 2024-11-12 PROBLEM — R65.10 SIRS (SYSTEMIC INFLAMMATORY RESPONSE SYNDROME) (HCC): Status: ACTIVE | Noted: 2024-11-12

## 2024-11-12 LAB
ANION GAP SERPL CALCULATED.3IONS-SCNC: 12 MMOL/L (ref 4–13)
AORTIC ROOT: 2.9 CM
APICAL FOUR CHAMBER EJECTION FRACTION: 72 %
ATRIAL RATE: 103 BPM
ATRIAL RATE: 288 BPM
ATRIAL RATE: 87 BPM
AV LVOT PEAK GRADIENT: 9 MMHG
AV PEAK GRADIENT: 17 MMHG
BASOPHILS # BLD AUTO: 0.01 THOUSANDS/ÂΜL (ref 0–0.1)
BASOPHILS NFR BLD AUTO: 0 % (ref 0–1)
BSA FOR ECHO PROCEDURE: 1.7 M2
BUN SERPL-MCNC: 4 MG/DL (ref 5–25)
C DIFF TOX GENS STL QL NAA+PROBE: NEGATIVE
CALCIUM SERPL-MCNC: 8.7 MG/DL (ref 8.4–10.2)
CHLORIDE SERPL-SCNC: 105 MMOL/L (ref 96–108)
CO2 SERPL-SCNC: 18 MMOL/L (ref 21–32)
CREAT SERPL-MCNC: 0.64 MG/DL (ref 0.6–1.3)
DOP CALC LVOT AREA: 3.14 CM2
DOP CALC LVOT DIAMETER: 2 CM
E WAVE DECELERATION TIME: 199 MS
E/A RATIO: 0.83
EOSINOPHIL # BLD AUTO: 0 THOUSAND/ÂΜL (ref 0–0.61)
EOSINOPHIL NFR BLD AUTO: 0 % (ref 0–6)
ERYTHROCYTE [DISTWIDTH] IN BLOOD BY AUTOMATED COUNT: 12.6 % (ref 11.6–15.1)
ETHANOL SERPL-MCNC: <10 MG/DL
FRACTIONAL SHORTENING: 42 (ref 28–44)
GFR SERPL CREATININE-BSD FRML MDRD: 96 ML/MIN/1.73SQ M
GLUCOSE SERPL-MCNC: 139 MG/DL (ref 65–140)
HCT VFR BLD AUTO: 38 % (ref 34.8–46.1)
HGB BLD-MCNC: 13 G/DL (ref 11.5–15.4)
IMM GRANULOCYTES # BLD AUTO: 0.04 THOUSAND/UL (ref 0–0.2)
IMM GRANULOCYTES NFR BLD AUTO: 0 % (ref 0–2)
INTERVENTRICULAR SEPTUM IN DIASTOLE (PARASTERNAL SHORT AXIS VIEW): 0.8 CM
INTERVENTRICULAR SEPTUM: 0.8 CM (ref 0.6–1.1)
LAAS-AP2: 15.4 CM2
LAAS-AP4: 11.5 CM2
LEFT ATRIUM SIZE: 3.5 CM
LEFT ATRIUM VOLUME (MOD BIPLANE): 29 ML
LEFT ATRIUM VOLUME INDEX (MOD BIPLANE): 17.1 ML/M2
LEFT INTERNAL DIMENSION IN SYSTOLE: 2.5 CM (ref 2.1–4)
LEFT VENTRICULAR INTERNAL DIMENSION IN DIASTOLE: 4.3 CM (ref 3.5–6)
LEFT VENTRICULAR POSTERIOR WALL IN END DIASTOLE: 0.7 CM
LEFT VENTRICULAR STROKE VOLUME: 59 ML
LVSV (TEICH): 59 ML
LYMPHOCYTES # BLD AUTO: 0.43 THOUSANDS/ÂΜL (ref 0.6–4.47)
LYMPHOCYTES NFR BLD AUTO: 4 % (ref 14–44)
MAGNESIUM SERPL-MCNC: 2.8 MG/DL (ref 1.9–2.7)
MCH RBC QN AUTO: 33 PG (ref 26.8–34.3)
MCHC RBC AUTO-ENTMCNC: 34.2 G/DL (ref 31.4–37.4)
MCV RBC AUTO: 96 FL (ref 82–98)
MONOCYTES # BLD AUTO: 0.12 THOUSAND/ÂΜL (ref 0.17–1.22)
MONOCYTES NFR BLD AUTO: 1 % (ref 4–12)
MV E'TISSUE VEL-SEP: 11 CM/S
MV PEAK A VEL: 1.01 M/S
MV PEAK E VEL: 84 CM/S
MV STENOSIS PRESSURE HALF TIME: 58 MS
MV VALVE AREA P 1/2 METHOD: 3.79
NEUTROPHILS # BLD AUTO: 11.25 THOUSANDS/ÂΜL (ref 1.85–7.62)
NEUTS SEG NFR BLD AUTO: 95 % (ref 43–75)
NRBC BLD AUTO-RTO: 0 /100 WBCS
OSMOLALITY UR/SERPL-RTO: 285 MMOL/KG (ref 282–298)
OSMOLALITY UR: 195 MMOL/KG
P AXIS: 72 DEGREES
P AXIS: 79 DEGREES
PLATELET # BLD AUTO: 266 THOUSANDS/UL (ref 149–390)
PLATELET BLD QL SMEAR: ADEQUATE
PMV BLD AUTO: 9.8 FL (ref 8.9–12.7)
POTASSIUM SERPL-SCNC: 3.6 MMOL/L (ref 3.5–5.3)
PR INTERVAL: 146 MS
PR INTERVAL: 148 MS
QRS AXIS: -14 DEGREES
QRS AXIS: 12 DEGREES
QRS AXIS: 32 DEGREES
QRS AXIS: 4 DEGREES
QRS AXIS: 9 DEGREES
QRSD INTERVAL: 56 MS
QRSD INTERVAL: 56 MS
QRSD INTERVAL: 58 MS
QRSD INTERVAL: 62 MS
QRSD INTERVAL: 66 MS
QT INTERVAL: 314 MS
QT INTERVAL: 322 MS
QT INTERVAL: 348 MS
QT INTERVAL: 414 MS
QT INTERVAL: 440 MS
QTC INTERVAL: 478 MS
QTC INTERVAL: 499 MS
QTC INTERVAL: 520 MS
QTC INTERVAL: 530 MS
QTC INTERVAL: 542 MS
RBC # BLD AUTO: 3.94 MILLION/UL (ref 3.81–5.12)
RBC MORPH BLD: NORMAL
RIGHT ATRIUM AREA SYSTOLE A4C: 14.5 CM2
RIGHT VENTRICLE ID DIMENSION: 3.1 CM
SL CV LEFT ATRIUM LENGTH A2C: 4.8 CM
SL CV LV EF: 60
SL CV PED ECHO LEFT VENTRICLE DIASTOLIC VOLUME (MOD BIPLANE) 2D: 81 ML
SL CV PED ECHO LEFT VENTRICLE SYSTOLIC VOLUME (MOD BIPLANE) 2D: 22 ML
SODIUM 24H UR-SCNC: 21 MOL/L
SODIUM SERPL-SCNC: 135 MMOL/L (ref 135–147)
T WAVE AXIS: 43 DEGREES
T WAVE AXIS: 43 DEGREES
T WAVE AXIS: 53 DEGREES
T WAVE AXIS: 61 DEGREES
T WAVE AXIS: 87 DEGREES
TRICUSPID ANNULAR PLANE SYSTOLIC EXCURSION: 1.8 CM
VENTRICULAR RATE: 103 BPM
VENTRICULAR RATE: 134 BPM
VENTRICULAR RATE: 139 BPM
VENTRICULAR RATE: 144 BPM
VENTRICULAR RATE: 87 BPM
WBC # BLD AUTO: 11.85 THOUSAND/UL (ref 4.31–10.16)

## 2024-11-12 PROCEDURE — 83935 ASSAY OF URINE OSMOLALITY: CPT

## 2024-11-12 PROCEDURE — 93306 TTE W/DOPPLER COMPLETE: CPT

## 2024-11-12 PROCEDURE — 99254 IP/OBS CNSLTJ NEW/EST MOD 60: CPT | Performed by: INTERNAL MEDICINE

## 2024-11-12 PROCEDURE — 94760 N-INVAS EAR/PLS OXIMETRY 1: CPT

## 2024-11-12 PROCEDURE — 84300 ASSAY OF URINE SODIUM: CPT

## 2024-11-12 PROCEDURE — 82077 ASSAY SPEC XCP UR&BREATH IA: CPT

## 2024-11-12 PROCEDURE — 93306 TTE W/DOPPLER COMPLETE: CPT | Performed by: INTERNAL MEDICINE

## 2024-11-12 PROCEDURE — 83735 ASSAY OF MAGNESIUM: CPT

## 2024-11-12 PROCEDURE — 85025 COMPLETE CBC W/AUTO DIFF WBC: CPT

## 2024-11-12 PROCEDURE — 97535 SELF CARE MNGMENT TRAINING: CPT

## 2024-11-12 PROCEDURE — 94664 DEMO&/EVAL PT USE INHALER: CPT

## 2024-11-12 PROCEDURE — 93010 ELECTROCARDIOGRAM REPORT: CPT | Performed by: INTERNAL MEDICINE

## 2024-11-12 PROCEDURE — 83930 ASSAY OF BLOOD OSMOLALITY: CPT

## 2024-11-12 PROCEDURE — 97167 OT EVAL HIGH COMPLEX 60 MIN: CPT

## 2024-11-12 PROCEDURE — 80048 BASIC METABOLIC PNL TOTAL CA: CPT

## 2024-11-12 PROCEDURE — 99232 SBSQ HOSP IP/OBS MODERATE 35: CPT | Performed by: FAMILY MEDICINE

## 2024-11-12 PROCEDURE — 94640 AIRWAY INHALATION TREATMENT: CPT

## 2024-11-12 RX ORDER — MAGNESIUM SULFATE HEPTAHYDRATE 40 MG/ML
2 INJECTION, SOLUTION INTRAVENOUS ONCE
Status: COMPLETED | OUTPATIENT
Start: 2024-11-12 | End: 2024-11-12

## 2024-11-12 RX ORDER — POTASSIUM CHLORIDE 1500 MG/1
40 TABLET, EXTENDED RELEASE ORAL ONCE
Status: COMPLETED | OUTPATIENT
Start: 2024-11-12 | End: 2024-11-12

## 2024-11-12 RX ORDER — LEVALBUTEROL INHALATION SOLUTION 1.25 MG/3ML
1.25 SOLUTION RESPIRATORY (INHALATION)
Status: DISCONTINUED | OUTPATIENT
Start: 2024-11-12 | End: 2024-11-16 | Stop reason: HOSPADM

## 2024-11-12 RX ORDER — LANOLIN ALCOHOL/MO/W.PET/CERES
100 CREAM (GRAM) TOPICAL DAILY
Status: DISCONTINUED | OUTPATIENT
Start: 2024-11-12 | End: 2024-11-16 | Stop reason: HOSPADM

## 2024-11-12 RX ORDER — FOLIC ACID 1 MG/1
1 TABLET ORAL DAILY
Status: DISCONTINUED | OUTPATIENT
Start: 2024-11-12 | End: 2024-11-16 | Stop reason: HOSPADM

## 2024-11-12 RX ORDER — NYSTATIN 100000 [USP'U]/G
POWDER TOPICAL 2 TIMES DAILY
Status: DISCONTINUED | OUTPATIENT
Start: 2024-11-12 | End: 2024-11-16 | Stop reason: HOSPADM

## 2024-11-12 RX ORDER — SODIUM CHLORIDE, SODIUM LACTATE, POTASSIUM CHLORIDE, CALCIUM CHLORIDE 600; 310; 30; 20 MG/100ML; MG/100ML; MG/100ML; MG/100ML
125 INJECTION, SOLUTION INTRAVENOUS CONTINUOUS
Status: DISCONTINUED | OUTPATIENT
Start: 2024-11-12 | End: 2024-11-12

## 2024-11-12 RX ADMIN — LEVALBUTEROL HYDROCHLORIDE 1.25 MG: 1.25 SOLUTION RESPIRATORY (INHALATION) at 16:18

## 2024-11-12 RX ADMIN — FLUOXETINE HYDROCHLORIDE 20 MG: 20 CAPSULE ORAL at 11:21

## 2024-11-12 RX ADMIN — FOLIC ACID 1 MG: 1 TABLET ORAL at 11:21

## 2024-11-12 RX ADMIN — METHYLPREDNISOLONE SODIUM SUCCINATE 40 MG: 40 INJECTION, POWDER, FOR SOLUTION INTRAMUSCULAR; INTRAVENOUS at 17:23

## 2024-11-12 RX ADMIN — IPRATROPIUM BROMIDE 0.5 MG: 0.5 SOLUTION RESPIRATORY (INHALATION) at 16:12

## 2024-11-12 RX ADMIN — LEVALBUTEROL HYDROCHLORIDE 1.25 MG: 1.25 SOLUTION RESPIRATORY (INHALATION) at 19:21

## 2024-11-12 RX ADMIN — ATORVASTATIN CALCIUM 40 MG: 40 TABLET, FILM COATED ORAL at 17:24

## 2024-11-12 RX ADMIN — METHYLPREDNISOLONE SODIUM SUCCINATE 40 MG: 40 INJECTION, POWDER, FOR SOLUTION INTRAMUSCULAR; INTRAVENOUS at 00:38

## 2024-11-12 RX ADMIN — LISINOPRIL 20 MG: 20 TABLET ORAL at 11:21

## 2024-11-12 RX ADMIN — AZITHROMYCIN DIHYDRATE 500 MG: 250 TABLET ORAL at 21:56

## 2024-11-12 RX ADMIN — Medication 1 TABLET: at 11:21

## 2024-11-12 RX ADMIN — IPRATROPIUM BROMIDE 0.5 MG: 0.5 SOLUTION RESPIRATORY (INHALATION) at 19:24

## 2024-11-12 RX ADMIN — ENOXAPARIN SODIUM 70 MG: 80 INJECTION SUBCUTANEOUS at 21:54

## 2024-11-12 RX ADMIN — POTASSIUM CHLORIDE 40 MEQ: 1500 TABLET, EXTENDED RELEASE ORAL at 11:20

## 2024-11-12 RX ADMIN — NICOTINE 1 PATCH: 7 PATCH, EXTENDED RELEASE TRANSDERMAL at 11:22

## 2024-11-12 RX ADMIN — LEVALBUTEROL HYDROCHLORIDE 1.25 MG: 1.25 SOLUTION RESPIRATORY (INHALATION) at 14:19

## 2024-11-12 RX ADMIN — OLANZAPINE 10 MG: 2.5 TABLET, FILM COATED ORAL at 11:19

## 2024-11-12 RX ADMIN — ROPINIROLE 0.5 MG: 0.25 TABLET, FILM COATED ORAL at 21:56

## 2024-11-12 RX ADMIN — METHYLPREDNISOLONE SODIUM SUCCINATE 40 MG: 40 INJECTION, POWDER, FOR SOLUTION INTRAMUSCULAR; INTRAVENOUS at 08:16

## 2024-11-12 RX ADMIN — MAGNESIUM SULFATE HEPTAHYDRATE 2 G: 40 INJECTION, SOLUTION INTRAVENOUS at 00:28

## 2024-11-12 RX ADMIN — THIAMINE HCL TAB 100 MG 100 MG: 100 TAB at 11:21

## 2024-11-12 RX ADMIN — GUAIFENESIN 600 MG: 600 TABLET, EXTENDED RELEASE ORAL at 17:34

## 2024-11-12 RX ADMIN — NYSTATIN: 100000 POWDER TOPICAL at 17:24

## 2024-11-12 RX ADMIN — ENOXAPARIN SODIUM 70 MG: 80 INJECTION SUBCUTANEOUS at 11:21

## 2024-11-12 RX ADMIN — GUAIFENESIN 600 MG: 600 TABLET, EXTENDED RELEASE ORAL at 11:21

## 2024-11-12 RX ADMIN — SODIUM CHLORIDE, SODIUM LACTATE, POTASSIUM CHLORIDE, AND CALCIUM CHLORIDE 125 ML/HR: .6; .31; .03; .02 INJECTION, SOLUTION INTRAVENOUS at 08:15

## 2024-11-12 RX ADMIN — SODIUM CHLORIDE, SODIUM LACTATE, POTASSIUM CHLORIDE, AND CALCIUM CHLORIDE 125 ML/HR: .6; .31; .03; .02 INJECTION, SOLUTION INTRAVENOUS at 00:16

## 2024-11-12 RX ADMIN — ROPINIROLE 0.5 MG: 0.25 TABLET, FILM COATED ORAL at 00:38

## 2024-11-12 NOTE — ASSESSMENT & PLAN NOTE
POA with over 10 episodes of diarrhea and 5 episode of vomiting  started the morning of admission  Denies any recent antibiotic usage or travel. Denies abdominal pain,denies fever, or chills.  In ED, received 1 dose of Imodium  Noted afebrile, leukocytosis WBC 14.64 , procal negative  CT of the abdomen pelvis with contrast: Negative for acute pathology. Hepatic steatosis. Stable 3.4 cm left adrenal gland nodule.no diverticulitis noted  Cdiff PCR negative.  Bacterial enteric panel could not be collected  Hold off further doses of imodium  Monitor off antibiotics

## 2024-11-12 NOTE — ASSESSMENT & PLAN NOTE
POA with shortness of breath, dizziness, nausea/vomiting and diarrhea that started this morning.  She went to urgent care and she was found that her heart rate 130-150 in the office and EKG revealed  afib with RVR and send to the ED for further evaluation  In ED  found be Rapid afib with RVR Rate 134  In ED received 15 mg of IV Cardizem x 1 & repeat EKG reviewed and interpreted:  normal sinus with rate 87, no ST-T elevation noted  Troponins x 2 negative  [id number] 2  In ED ,CR level 0.79, received weight-based Lovenox SQ for anticoagulation  Suspect new onset A-fib secondary to dehydration and acute COPD exacerbation  Plan  Continue Lovenox sq weight-based  Cardiology consulted appreciate recommendations  Monitor on telemetry  Obtain Echo

## 2024-11-12 NOTE — ASSESSMENT & PLAN NOTE
POA with tachycardia, leukocytosis WBC 14.64, and tachypnea  Suspect reactionary vs infectious versus viral   Procal negative  Plan  Monitor WBC and fever curve  Hold IV antbx  Follow up with stool cultures and Cdiff

## 2024-11-12 NOTE — ASSESSMENT & PLAN NOTE
long history of tobacco abuse  noted wheezing intermittently today  11/11/2024 CT chest PE protocol, negative for PE but presence of mild centrilobular emphysema noted  patient ordered for azithromycin, Xopenex and IV steroids per primary team

## 2024-11-12 NOTE — ASSESSMENT & PLAN NOTE
in the setting of electrolyte imbalance, nausea, vomiting and diarrhea  patient converted back to sinus rhythm after receiving electrolyte replacement and Cardizem 15 mg IV times one  maintaining sinus rhythm  [id number] score = 2, or 2.2% risk of stroke per year, patient is currently on subcu Lovenox weight based, recommend conversion to factor X a inhibitor at time of discharge due to her risk factors  discuss with patient would recommend short course of anticoagulation due to her risk of stroke with monitoring for recurrence of atrial fibrillation  encourage smoking and alcohol cessation

## 2024-11-12 NOTE — PHYSICAL THERAPY NOTE
PHYSICAL THERAPY     11/12/24 1125   Note Type   Note type Cancelled Session   Cancel Reasons Other  (patient with nursing care. will re-attempt at a later time)   Licensure   [place] License Number  Erica Holguin PT [id number]

## 2024-11-12 NOTE — OCCUPATIONAL THERAPY NOTE
Occupational Therapy Evaluation/Treatment       11/12/24 1005   OT Last Visit   OT Visit Date 11/12/24   Note Type   Note type Evaluation   Pain Assessment   Pain Assessment Tool 0-10   Pain Score No Pain   Restrictions/Precautions   Other Precautions Chair Alarm;Bed Alarm;Fall Risk   Home Living   Type of Home Apartment   Home Layout One level  (1 YARY)   Bathroom Shower/Tub Walk-in shower   Bathroom Toilet Standard   Home Equipment   (none)   Additional Comments patient doesnt drive, gets rides from friends   Prior Function   Level of [place] Independent with ADLs;Independent with functional mobility;Needs assistance with IADLS   Lives With Alone   Receives Help From Family  (significant other)   IADLs Family/Friend/Other provides transportation   Lifestyle   Reciprocal Relationships boyfriend Vasquez present for session   Intrinsic Gratification TV, game shows, walking   ADL   Eating Assistance 7  Independent   Grooming Assistance 7  Independent   UB Bathing Assistance 5  Supervision/Setup   LB Bathing Assistance 4  Minimal Assistance   UB Dressing Assistance 5  Supervision/Setup   LB Dressing Assistance 4  Minimal Assistance   Toileting Assistance  4  Minimal Assistance   Bed Mobility   Supine to Sit 5  Supervision   Transfers   Sit to Stand 4  Minimal assistance   Stand to Sit 4  Minimal assistance   Functional Mobility   Functional Mobility 4  Minimal assistance   Additional Comments few steps bed to chair with hand hold assist, SOB with activity   Balance   Static Sitting Fair +   Dynamic Sitting Fair   Static Standing Fair   Dynamic Standing Fair -   Activity Tolerance   Activity Tolerance Patient limited by fatigue   RUE Assessment   RUE Assessment WFL   LUE Assessment   LUE Assessment WFL   Cognition   Overall Cognitive Status WFL   Arousal/Participation Cooperative   Attention Within functional limits   Orientation Level Oriented X4   Following Commands Follows all commands and directions without  difficulty   Assessment   Limitation Decreased ADL status;Decreased UE strength;Decreased Safe judgement during ADL;Decreased endurance;Decreased self-care trans;Decreased high-level ADLs  (decreased balanace and mobility)   Prognosis Good   Assessment Patient evaluated by Occupational Therapy.  Patient admitted with New onset a-fib (HCC).  The patients occupational profile, medical and therapy history includes a extensive additional review of physical, cognitive, or psychosocial history related to current functional performance.  Comorbidities affecting functional mobility and ADLS include: anxiety, COPD, and depression.  Prior to admission, patient was independent with functional mobility without assistive device, independent with ADLS, and requiring assist for IADLS.  The evaluation identifies the following performance deficits: weakness, impaired balance, decreased endurance, increased fall risk, new onset of impairment of functional mobility, decreased ADLS, decreased IADLS, decreased activity tolerance, decreased safety awareness, SOB upon exertion, and decreased strength, that result in activity limitations and/or participation restrictions. This evaluation requires clinical decision making of high complexity, because the patient presents with comorbidites that affect occupational performance and required significant modification of tasks or assistance with consideration of multiple treatment options.  The Barthel Index was used as a functional outcome tool presenting with a score of Barthel Index Score: 50, indicating marked limitations of functional mobility and ADLS.  The patient's raw score on the -PAC Daily Activity Inpatient Short Form is 21. A raw score of greater than or equal to 19 suggests the patient may benefit from discharge to home. Please refer to the recommendation of the Occupational Therapist for safe discharge planning.  Patient will benefit from skilled Occupational Therapy services to  address above deficits and facilitate a safe return to prior level of function.   Goals   Patient Goals to feel better and go home   STG Time Frame   (1-7 days)   Short Term Goal  Goals established to promote Patient Goals: to feel better and go home:  Grooming: independent standing at sink; Bathing: supervision; Upper Body Dressing independent; Lower Body Dressing: supervision; Toileting: supervision; Patient will increase ambulatory standard toilet transfer to supervision with least restrictive assistive device to increase performance and safety with ADLS and functional mobility; Patient will increase standing tolerance to 5 minutes during ADL task to decrease assistance level and decrease fall risk; Patient will increase bed mobility to independent in preparation for ADLS and transfers; Patient will increase functional mobility to and from bathroom with least restrictive assistive device with supervision to increase performance with ADLS and to use a toilet; Patient will tolerate 5 minutes of UE ROM/strengthening to increase general activity tolerance and performance in ADLS/IADLS; Patient will improve functional activity tolerance to 10 minutes of sustained functional tasks to increase participation in basic self-care and decrease assistance level;  Patient will be able to to verbalize understanding and perform energy conservation/proper body mechanics during ADLS and functional mobility at least 75% of the time with minimal cueing to decrease signs of fatigue and increase stamina to return to prior level of function; Patient will increase dynamic sitting balance to fair+ to improve the ability to sit at edge of bed or on a chair for ADLS;  Patient will increase dynamic standing balance to fair to improve postural stability and decrease fall risk during standing ADLS and transfers.   LTG Time Frame   (8-14 days)   Long Term Goal Bathing: independent; Lower Body Dressing: independent; Toileting: independent;  Patient will increase ambulatory standard toilet transfer to independent with least restrictive assistive device to increase performance and safety with ADLS and functional mobility; Patient will increase standing tolerance to 10 minutes during ADL task to decrease assistance level and decrease fall risk;  Patient will increase functional mobility to and from bathroom with least restrictive assistive device independently to increase performance with ADLS and to use a toilet; Patient will tolerate 10 minutes of UE ROM/strengthening to increase general activity tolerance and performance in ADLS/IADLS; Patient will improve functional activity tolerance to 20 minutes of sustained functional tasks to increase participation in basic self-care and decrease assistance level;  Patient will be able to to verbalize understanding and perform energy conservation/proper body mechanics during ADLS and functional mobility at least 90% of the time to decrease signs of fatigue and increase stamina to return to prior level of function; Patient will increase dynamic sitting balance to good to improve the ability to sit at edge of bed or on a chair for ADLS;  Patient will increase dynamic standing balance to fair+ to improve postural stability and decrease fall risk during standing ADLS and transfers.   Pt will score >/= 24/24 on AM-PAC Daily Activity Inpatient scale to promote safe independence with ADLs and functional mobility; Pt will score >/= 80/100 on Barthel Index in order to decrease caregiver assistance needed and increase ability to perform ADLs and functional mobility.   Plan   Treatment Interventions ADL retraining;Functional transfer training;UE strengthening/ROM;Endurance training;Patient/family training;Equipment evaluation/education;Activityengagement;Compensatory technique education;Energy conservation   Goal Expiration Date 11/26/24   OT Frequency 3-5x/wk   Discharge Recommendation   Rehab Resource Intensity Level, OT  III (Minimum Resource Intensity)   AM-PAC Daily Activity Inpatient   Lower Body Dressing 3   Bathing 3   Toileting 3   Upper Body Dressing 4   Grooming 4   Eating 4   Daily Activity Raw Score 21   Daily Activity Standardized Score (Calc for Raw Score >=11) 44.27   AM-PAC Applied Cognition Inpatient   Following a Speech/Presentation 4   Understanding Ordinary Conversation 4   Taking Medications 4   Remembering Where Things Are Placed or Put Away 4   Remembering List of 4-5 Errands 4   Taking Care of Complicated Tasks 4   Applied Cognition Raw Score 24   Applied Cognition Standardized Score 62.21   Barthel Index   Feeding 10   Bathing 0   Grooming Score 5   Dressing Score 5   Bladder Score 5   Bowels Score 10   Toilet Use Score 5   Transfers (Bed/Chair) Score 10   Mobility (Level Surface) Score 0   Stairs Score 0   Barthel Index Score 50   Additional Treatment Session   Start Time 0955   End Time 1005   Treatment Assessment S: denies pain O: Completed LB and perineal bathing due to incontinence with supervision in stance.  Changed gown with min assist seated.  Sit to stand x 2 with supervision.  Static standing x 2 minutes with supervision for linen change. Patient donned underwear with supervision.  A: Patient is SOB with activity but able to tolerate with rest breaks.  Patient is pleasant and cooperative and have a supportive boyfriend present.  Patient will benefit from continued OT services to maximize functional performance with ADLS.  P: III-minimum resource intensity   Licensure   [place] License Number  Sakina Amin MS OTR/L [id number]

## 2024-11-12 NOTE — PROGRESS NOTES
"Progress Note - Hospitalist   Name: Sophie Jamil 59 y.o. female I MRN: [id number]  Unit/Bed#: [address] [place] [address] Date of Admission: 11/11/2024   Date of Service: 11/12/2024 I Hospital Day: 1    Assessment & Plan  New onset a-fib (HCC)  POA with shortness of breath, dizziness, nausea/vomiting and diarrhea that started this morning.  She went to urgent care and she was found that her heart rate 130-150 in the office and EKG revealed  afib with RVR and send to the ED for further evaluation  In ED  found be Rapid afib with RVR Rate 134  In ED received 15 mg of IV Cardizem x 1 & repeat EKG showed that patient had converted to sinus rhythm  Troponins x 2 negative  [id number] 2  In ED, received weight-based Lovenox SQ for anticoagulation  Suspect new onset A-fib secondary to electrolyte imbalance, diarrhea and acute COPD exacerbation  Continue Lovenox sq weight-based, NOAC on discharge  Cardiology consulted, appreciate recommendations  Echo -EF 60% with normal diastolic function  Chronic obstructive pulmonary disease with acute exacerbation (HCC)  POA with shortness of breath and expiratory wheezing starting the morning of admission  Per patient ran out of home albuterol inhaler  D dimer elevated: 1.07  CTA chest: neg PE. \"Mild centrilobular emphysema. Left lower lobe calcified granuloma. Minimal scarring at the base of the lingula there is no tracheal or endobronchial lesion\"  Continues to smoke a half a pack a day  Suspect acute COPD exacerbation  Continue IV Solu-Medrol  40 mg Q8 for today  Start scheduled Xopenex and ipratropium along with Xopenex and ipratropium as needed  Azithromycin 500 mg every 24  Mucinex q 12 hr  Encouraged smoking cessation today  Diarrhea  POA with over 10 episodes of diarrhea and 5 episode of vomiting  started the morning of admission  Denies any recent antibiotic usage or travel. Denies abdominal pain,denies fever, or chills.  In ED, received 1 dose of Imodium  Noted afebrile, leukocytosis WBC " 14.64 , procal negative  CT of the abdomen pelvis with contrast: Negative for acute pathology. Hepatic steatosis. Stable 3.4 cm left adrenal gland nodule.no diverticulitis noted  Cdiff PCR negative.  Bacterial enteric panel could not be collected  Hold off further doses of imodium  Monitor off antibiotics  Nicotine dependence  Smokes 1/2 pk for greater than 50 years  Continue nicotine patch  Smoking cessation provided     ETOH abuse  Per patient drinks daily. 40 oz beer daily  Per patient last drink was prior to ED arrival  Denies w/d seizures  Not in acute withdrawal  Ethanol level negative  Continue CIWA protocol   Initiate multivitamin,  thiamine and folic acid  Complete alcohol cessation discussed with patient  MDD (major depressive disorder)  Mood stable.  Home medication Prozac and Zyprexa continued     VTE Pharmacologic Prophylaxis:    Lovenox    Mobility:   Basic Mobility Inpatient Raw Score: 13  JH-HLM Goal: 4: Move to chair/commode  JH-HLM Achieved: 4: Move to chair/commode  JH-HLM Goal achieved. Continue to encourage appropriate mobility.    Patient Centered Rounds: I performed bedside rounds with nursing staff today.   Discussions with Specialists or Other Care Team Provider: Yes - cardiology    Education and Discussions with Family / Patient: Updated  (significant other) at bedside.    Current Length of Stay: 1 day(s)  Current Patient Status: Inpatient   Certification Statement: The patient will continue to require additional inpatient hospital stay due to COPD exacerbation requiring steroids  Discharge Plan: Anticipate discharge in 48-72 hrs to home.    Code Status: Level 1 - Full Code    Subjective     Patient states breathing has improved.  No further diarrhea today.  Per significant other at bedside, she appears much better today    Objective :  Temp:  [96.9 °F (36.1 °C)-98.2 °F (36.8 °C)] 97.2 °F (36.2 °C)  HR:  [] 84  BP: (111-156)/() 133/88  Resp:  [18-26] 18  SpO2:  [94  %-99 %] 96 %  O2 Device: None (Room air)    Body mass index is 31.25 kg/m².     Input and Output Summary (last 24 hours):     Intake/Output Summary (Last 24 hours) at 11/12/2024 1447  Last data filed at 11/12/2024 1037  Gross per 24 hour   Intake 1000 ml   Output 900 ml   Net 100 ml       Physical Exam  Vitals reviewed.   Constitutional:       General: She is not in acute distress.     Appearance: She is well-developed. She is not toxic-appearing.   HENT:      Head: Normocephalic and atraumatic.   Eyes:      Conjunctiva/sclera: Conjunctivae normal.   Cardiovascular:      Rate and Rhythm: Normal rate and regular rhythm.   Pulmonary:      Effort: Pulmonary effort is normal. No respiratory distress.      Breath sounds: Wheezing present. No rales.      Comments: Decreased breath sounds bilaterally  Abdominal:      General: Bowel sounds are normal. There is no distension.      Palpations: Abdomen is soft.      Tenderness: There is no abdominal tenderness.   Neurological:      Mental Status: She is alert and oriented to person, place, and time.           Lines/Drains:            Lab Results: I have reviewed the following results:   Results from last 7 days   Lab Units 11/12/24  0450   WBC Thousand/uL 11.85*   HEMOGLOBIN g/dL 13.0   HEMATOCRIT % 38.0   PLATELETS Thousands/uL 266   SEGS PCT % 95*   LYMPHO PCT % 4*   MONO PCT % 1*   EOS PCT % 0     Results from last 7 days   Lab Units 11/12/24  0450 11/11/24  1705   SODIUM mmol/L 135 128*   POTASSIUM mmol/L 3.6 3.8   CHLORIDE mmol/L 105 96   CO2 mmol/L 18* 14*   BUN mg/dL 4* 7   CREATININE mg/dL 0.64 0.79   ANION GAP mmol/L 12 18*   CALCIUM mg/dL 8.7 8.7   ALBUMIN g/dL  --  4.2   TOTAL BILIRUBIN mg/dL  --  0.75   ALK PHOS U/L  --  102   ALT U/L  --  81*   AST U/L  --  153*   GLUCOSE RANDOM mg/dL 139 145*                 Results from last 7 days   Lab Units 11/11/24  2245   PROCALCITONIN ng/ml 0.08       Recent Cultures (last 7 days):   Results from last 7 days   Lab Units  11/11/24 2238   C DIFF TOXIN B BY PCR  Negative       Imaging Results Review: I reviewed radiology reports from this admission including: CT chest, CT abdomen/pelvis, and Echocardiogram.  Other Study Results Review: No additional pertinent studies reviewed.    Last 24 Hours Medication List:     Current Facility-Administered Medications:     acetaminophen (TYLENOL) tablet 650 mg, Q6H PRN    atorvastatin (LIPITOR) tablet 40 mg, Daily With Dinner    azithromycin (ZITHROMAX) tablet 500 mg, Q24H    diltiazem (CARDIZEM) injection 15 mg, Once    enoxaparin (LOVENOX) subcutaneous injection 70 mg, Q12H HENRY    FLUoxetine (PROzac) capsule 20 mg, Daily    folic acid (FOLVITE) tablet 1 mg, Daily    guaiFENesin (MUCINEX) 12 hr tablet 600 mg, BID    levalbuterol (XOPENEX) inhalation solution 1.25 mg, Q6H PRN **AND** sodium chloride 0.9 % inhalation solution 3 mL, Q6H PRN    lisinopril (ZESTRIL) tablet 20 mg, Daily    methylPREDNISolone sodium succinate (Solu-MEDROL) injection 40 mg, Q8H    multivitamin-minerals (CENTRUM) tablet 1 tablet, Daily    nicotine (NICODERM CQ) 7 mg/24hr TD 24 hr patch 1 patch, Daily    OLANZapine (ZyPREXA) tablet 10 mg, QAM    ondansetron (ZOFRAN) injection 4 mg, Q6H PRN    rOPINIRole (REQUIP) tablet 0.5 mg, HS    thiamine tablet 100 mg, Daily    Administrative Statements   Today, Patient Was Seen By: Jessica Grayson DO      **Please Note: This note may have been constructed using a voice recognition system.**

## 2024-11-12 NOTE — ASSESSMENT & PLAN NOTE
Present on admission with elevated  and ALT 81  Alk phos 102  Total bilirubin 1.5  CT of the abdomen pelvis with contrast:No acute inflammatory process identified in the abdomen or pelvis.  Hepatic steatosis.Stable 3.4 cm left adrenal gland nodule.Colonic diverticulosis without evidence of diverticulitis.  Monitor LFT  Encourage ETOH cessation

## 2024-11-12 NOTE — CONSULTS
Consultation - Cardiology   Name: Sophie Jamil 59 y.o. female I MRN: [id number]  Unit/Bed#: [address] [address]01 I Date of Admission: 11/11/2024   Date of Service: 11/12/2024 I Hospital Day: 1   Inpatient consult to Cardiology  Consult performed by: LADONNA Beth  Consult ordered by: LADONNA Rhoades        Physician Requesting Evaluation: Jessica Grayson DO   Reason for Evaluation / Principal Problem: new onset atrial fibrillation in the setting of electrolyte imbalance, nausea, vomiting and diarrhea    Assessment & Plan  New onset a-fib (HCC)  in the setting of electrolyte imbalance, nausea, vomiting and diarrhea  patient converted back to sinus rhythm after receiving electrolyte replacement and Cardizem 15 mg IV times one  maintaining sinus rhythm  [id number] score = 2, or 2.2% risk of stroke per year, patient is currently on subcu Lovenox weight based, recommend conversion to factor X a inhibitor at time of discharge due to her risk factors  discuss with patient would recommend short course of anticoagulation due to her risk of stroke with monitoring for recurrence of atrial fibrillation  encourage smoking and alcohol cessation  Chronic obstructive pulmonary disease with acute exacerbation (HCC)  long history of tobacco abuse  noted wheezing intermittently today  11/11/2024 CT chest PE protocol, negative for PE but presence of mild centrilobular emphysema noted  patient ordered for azithromycin, Xopenex and IV steroids per primary team  ETOH abuse  per patient she drinks approximately 40 ounces of beer daily and occasional wine  CIWA protocol per primary team  Diarrhea  per patient's sudden onset of diarrhea with over 10 episodes prior to be in seen at urgent care  no recent travel or antibiotic usage  no further diarrhea or vomiting since admission to hospital  11/11/2024 CT abdomen and pelvis: no acute inflammatory process, concern for hepatic steatosis  workup her primary team  Electrolyte  disturbance  magnesium was 1.5 with a potassium of 3.8 on admission,  replacement ongoing  Nicotine dependence  smokes approximately one half pack cigarettes per day for over 50 years  encourage smoking cessation  MDD (major depressive disorder)  patient does note she does not really go outside due to her depression  currently on Prozac and Zyprexa      History of Present Illness   Sophie Jamil is a 59 y.o. female who initially presented to the local care now with complaints of abdominal discomfort, nausea, vomiting and profound diarrhea. It started yesterday morning. She was found to be tachycardic and in atrial fibrillation on evaluation and sent to the emergency room. Initial magnesium was 1.5 with potassium 3.8 and 12 lead EKG demonstrated rapid atrial fibrillation at a rate of 139 bpm patient received replacement of her magnesium and potassium with Cardizem 15 mg IV and successfully converted back to sinus rhythm. She denies any previous episodes of palpitations or atrial fibrillation.    Patient has a history for COPD, hypertension, major depression for which she states she does not usually go out of her house, she is a smoker and is attempting to quit and does have a history of EtOH abuse    High-sensitivity troponins were negative, AST was 153 and ALT was 81 and all other labs were within normal limits.    Review of Systems   Constitutional: Negative.  Negative for activity change and fatigue.   HENT: Negative.  Negative for congestion, facial swelling, sinus pain and trouble swallowing.    Eyes: Negative.  Negative for photophobia and visual disturbance.   Respiratory:  Positive for cough and shortness of breath. Negative for chest tightness.    Cardiovascular:  Positive for palpitations. Negative for chest pain and leg swelling.   Gastrointestinal:  Positive for abdominal pain, diarrhea and nausea.   Endocrine: Negative.  Negative for polydipsia, polyphagia and polyuria.   Genitourinary: Negative.  Negative  for difficulty urinating.   Musculoskeletal: Negative.    Skin: Negative.    Neurological: Negative.  Negative for dizziness, syncope, weakness and light-headedness.   Hematological: Negative.    Psychiatric/Behavioral: Negative.       I have reviewed the patient's PMH, PSH, Social History, Family History, Meds, and Allergies  Historical Information   Past Medical History:   Diagnosis Date    Anxiety     COPD (chronic obstructive pulmonary disease) (HCC)     COPD exacerbation (HCC) 8/12/2016    Dental abscess 10/2020    Depression     ETOH abuse     Facial abscess 8/12/2016     History reviewed. No pertinent surgical history.  Social History     Tobacco Use    Smoking status: Every Day     Current packs/day: 0.25     Types: Cigarettes    Smokeless tobacco: Never    Tobacco comments:     smokes 1 pack in 3 days   Vaping Use    Vaping status: Never Used   Substance and Sexual Activity    Alcohol use: Not Currently     Comment: states drank today had some beer a couple hours ago    Drug use: No    Sexual activity: Not on file     E-Cigarette/Vaping    E-Cigarette Use Never User      E-Cigarette/Vaping Substances    Nicotine No     THC No     CBD No     Flavoring No     Other No     Unknown No      Family History   Problem Relation Age of Onset    Brain cancer Maternal Aunt     Cancer Other     Substance Abuse Neg Hx     Mental illness Neg Hx      Social History     Tobacco Use    Smoking status: Every Day     Current packs/day: 0.25     Types: Cigarettes    Smokeless tobacco: Never    Tobacco comments:     smokes 1 pack in 3 days   Vaping Use    Vaping status: Never Used   Substance and Sexual Activity    Alcohol use: Not Currently     Comment: states drank today had some beer a couple hours ago    Drug use: No    Sexual activity: Not on file       Current Facility-Administered Medications:     acetaminophen (TYLENOL) tablet 650 mg, Q6H PRN    atorvastatin (LIPITOR) tablet 40 mg, Daily With Dinner    azithromycin  (ZITHROMAX) tablet 500 mg, Q24H    diltiazem (CARDIZEM) injection 15 mg, Once    enoxaparin (LOVENOX) subcutaneous injection 70 mg, Q12H HENRY    FLUoxetine (PROzac) capsule 20 mg, Daily    folic acid (FOLVITE) tablet 1 mg, Daily    guaiFENesin (MUCINEX) 12 hr tablet 600 mg, BID    levalbuterol (XOPENEX) inhalation solution 1.25 mg, Q6H PRN **AND** sodium chloride 0.9 % inhalation solution 3 mL, Q6H PRN    lisinopril (ZESTRIL) tablet 20 mg, Daily    methylPREDNISolone sodium succinate (Solu-MEDROL) injection 40 mg, Q8H    multivitamin-minerals (CENTRUM) tablet 1 tablet, Daily    nicotine (NICODERM CQ) 7 mg/24hr TD 24 hr patch 1 patch, Daily    OLANZapine (ZyPREXA) tablet 10 mg, QAM    ondansetron (ZOFRAN) injection 4 mg, Q6H PRN    potassium chloride (Klor-Con M20) CR tablet 40 mEq, Once    rOPINIRole (REQUIP) tablet 0.5 mg, HS    thiamine tablet 100 mg, Daily  Prior to Admission Medications   Prescriptions Last Dose Informant Patient Reported? Taking?   FLUoxetine (PROzac) 20 mg capsule Not Taking Self No No   Sig: Take 1 capsule (20 mg total) by mouth daily   Patient not taking: Reported on 11/11/2024   OLANZapine (ZyPREXA) 10 mg tablet   No No   Sig: Take 1 tablet (10 mg total) by mouth every morning   QUEtiapine (SEROquel) 100 mg tablet   No No   Sig: Take 2 tablets (200 mg total) by mouth daily at bedtime   albuterol (PROVENTIL HFA,VENTOLIN HFA) 90 mcg/act inhaler Not Taking Self No No   Sig: Inhale 2 puffs every 6 (six) hours as needed for wheezing for up to 14 doses   Patient not taking: Reported on 11/11/2024   ergocalciferol (VITAMIN D2) 50,000 units Past Week  No Yes   Sig: Take 1 capsule (50,000 Units total) by mouth once a week   lisinopril (ZESTRIL) 20 mg tablet 11/11/2024 at 0900  No Yes   Sig: TAKE 1 TABLET (20 MG TOTAL) BY MOUTH DAILY   rOPINIRole (REQUIP) 0.5 mg tablet Unknown  No No   Sig: Take 1 tablet (0.5 mg total) by mouth daily at bedtime   rosuvastatin (CRESTOR) 20 MG tablet 11/11/2024 at 0900   No Yes   Sig: TAKE 1 TABLET (20 MG TOTAL) BY MOUTH DAILY      Facility-Administered Medications: None     Bee venom    Objective :  Temp:  [96.9 °F (36.1 °C)-98.2 °F (36.8 °C)] 97.2 °F (36.2 °C)  HR:  [] 84  BP: (111-156)/() 133/88  Resp:  [18-26] 18  SpO2:  [94 %-99 %] 96 %  O2 Device: None (Room air)  Orthostatic Blood Pressures      Flowsheet Row Most Recent Value   Blood Pressure 133/88 filed at 11/12/2024 0823   Patient Position - Orthostatic VS Lying filed at 11/12/2024 0823          First Weight: Weight - Scale: 72.7 kg (160 lb 4.4 oz) (11/11/24 2322)  Vitals:    11/11/24 2322   Weight: 72.7 kg (160 lb 4.4 oz)       Physical Exam  Vitals and nursing note reviewed.   Constitutional:       Appearance: Normal appearance. She is obese. She is ill-appearing (Chronically).   HENT:      Right Ear: External ear normal.      Left Ear: External ear normal.   Eyes:      General: No scleral icterus.        Right eye: No discharge.         Left eye: No discharge.   Cardiovascular:      Rate and Rhythm: Normal rate and regular rhythm.      Pulses: Normal pulses.   Pulmonary:      Effort: Pulmonary effort is normal. No accessory muscle usage or respiratory distress.      Breath sounds: Examination of the right-lower field reveals decreased breath sounds. Examination of the left-lower field reveals decreased breath sounds. Decreased breath sounds and wheezing present.      Comments: End expiratory wheezing  Abdominal:      General: Bowel sounds are normal. There is no distension.      Palpations: Abdomen is soft.   Skin:     General: Skin is warm and dry.      Capillary Refill: Capillary refill takes less than 2 seconds.   Neurological:      General: No focal deficit present.      Mental Status: She is alert and oriented to person, place, and time. Mental status is at baseline.   Psychiatric:         Mood and Affect: Mood normal.      Comments: History of depression which she states keeps her from doing many  things outside of the house             Lab Results: I have reviewed the following results:  Results from last 7 days   Lab Units 11/12/24  0450 11/11/24  1705   WBC Thousand/uL 11.85* 14.64*   HEMOGLOBIN g/dL 13.0 13.8   HEMATOCRIT % 38.0 40.5   PLATELETS Thousands/uL 266 287     Results from last 7 days   Lab Units 11/12/24  0450 11/11/24  1705   POTASSIUM mmol/L 3.6 3.8   CHLORIDE mmol/L 105 96   CO2 mmol/L 18* 14*   BUN mg/dL 4* 7   CREATININE mg/dL 0.64 0.79   CALCIUM mg/dL 8.7 8.7         Lab Results   Component Value Date    HGBA1C 5.9 (H) 03/19/2024     Lab Results   Component Value Date    TROPONINI <0.02 02/14/2020         EKG: initial 12 lead EKG demonstrated rapid atrial fibrillation at a rate of 139 bpm with nonspecific T wave abnormalities. After receiving one dose of IV Cardizem in the emergency room patient successfully converted back to sinus rhythm    VTE Prophylaxis: Enoxaparin (Lovenox)    Melissa DOUGHERTY  Cardiology

## 2024-11-12 NOTE — ASSESSMENT & PLAN NOTE
POA with shortness of breath, dizziness, nausea/vomiting and diarrhea that started this morning.  She went to urgent care and she was found that her heart rate 130-150 in the office and EKG revealed  afib with RVR and send to the ED for further evaluation  In ED  found be Rapid afib with RVR Rate 134  In ED received 15 mg of IV Cardizem x 1 & repeat EKG showed that patient had converted to sinus rhythm  Troponins x 2 negative  [id number] 2  In ED, received weight-based Lovenox SQ for anticoagulation  Suspect new onset A-fib secondary to electrolyte imbalance, diarrhea and acute COPD exacerbation  Continue Lovenox sq weight-based, NOAC on discharge  Cardiology consulted, appreciate recommendations  Echo -EF 60% with normal diastolic function

## 2024-11-12 NOTE — ASSESSMENT & PLAN NOTE
Present admission with sodium level 128  Suspect secondary to daily alcohol, dehydration, and diarrhea  In ED received 2 L of LR  Continue IV hydration LR at 125 ml/hr  Obtain urine osmolality, urine sodium and osmolality  Monitor BMP

## 2024-11-12 NOTE — ASSESSMENT & PLAN NOTE
smokes approximately one half pack cigarettes per day for over 50 years  encourage smoking cessation

## 2024-11-12 NOTE — ASSESSMENT & PLAN NOTE
patient does note she does not really go outside due to her depression  currently on Prozac and Zyprexa

## 2024-11-12 NOTE — ASSESSMENT & PLAN NOTE
per patient she drinks approximately 40 ounces of beer daily and occasional wine  CIWA protocol per primary team

## 2024-11-12 NOTE — PLAN OF CARE
Problem: PAIN - ADULT  Goal: Verbalizes/displays adequate comfort level or baseline comfort level  Description: Interventions:  - Encourage patient to monitor pain and request assistance  - Assess pain using appropriate pain scale  - Administer analgesics based on type and severity of pain and evaluate response  - Implement non-pharmacological measures as appropriate and evaluate response  - Consider cultural and social influences on pain and pain management  - Notify physician/advanced practitioner if interventions unsuccessful or patient reports new pain  Outcome: Progressing     Problem: INFECTION - ADULT  Goal: Absence or prevention of progression during hospitalization  Description: INTERVENTIONS:  - Assess and monitor for signs and symptoms of infection  - Monitor lab/diagnostic results  - Monitor all insertion sites, i.e. indwelling lines, tubes, and drains  - Monitor endotracheal if appropriate and nasal secretions for changes in amount and color  - [place] appropriate cooling/warming therapies per order  - Administer medications as ordered  - Instruct and encourage patient and family to use good hand hygiene technique  - Identify and instruct in appropriate isolation precautions for identified infection/condition  Outcome: Progressing  Goal: Absence of fever/infection during neutropenic period  Description: INTERVENTIONS:  - Monitor WBC    Outcome: Progressing     Problem: SAFETY ADULT  Goal: Patient will remain free of falls  Description: INTERVENTIONS:  - Educate patient/family on patient safety including physical limitations  - Instruct patient to call for assistance with activity   - Consult OT/PT to assist with strengthening/mobility   - Keep Call bell within reach  - Keep bed low and locked with side rails adjusted as appropriate  - Keep care items and personal belongings within reach  - Initiate and maintain comfort rounds  - Make Fall Risk Sign visible to staff  - Offer Toileting every 2 Hours,  in advance of need  - Initiate/Maintain bed alarm  - Obtain necessary fall risk management equipment: no slip socks, bed alarm   - Apply yellow socks and bracelet for high fall risk patients  - Consider moving patient to room near nurses station  Outcome: Progressing  Goal: Maintain or return to baseline ADL function  Description: INTERVENTIONS:  -  Assess patient's ability to carry out ADLs; assess patient's baseline for ADL function and identify physical deficits which impact ability to perform ADLs (bathing, care of mouth/teeth, toileting, grooming, dressing, etc.)  - Assess/evaluate cause of self-care deficits   - Assess range of motion  - Assess patient's mobility; develop plan if impaired  - Assess patient's need for assistive devices and provide as appropriate  - Encourage maximum independence but intervene and supervise when necessary  - Involve family in performance of ADLs  - Assess for home care needs following discharge   - Consider OT consult to assist with ADL evaluation and planning for discharge  - Provide patient education as appropriate  Outcome: Progressing  Goal: Maintains/Returns to pre admission functional level  Description: INTERVENTIONS:  - Perform AM-PAC 6 Click Basic Mobility/ Daily Activity assessment daily.  - Set and communicate daily mobility goal to care team and patient/family/caregiver.   - Collaborate with rehabilitation services on mobility goals if consulted  - Perform Range of Motion 3 times a day.  - Reposition patient every 2 hours.  - Dangle patient 3 times a day  - Stand patient 3 times a day  - Ambulate patient 3 times a day  - Out of bed to chair 3 times a day   - Out of bed for meals 3 times a day  - Out of bed for toileting  - Record patient progress and toleration of activity level   Outcome: Progressing     Problem: DISCHARGE PLANNING  Goal: Discharge to home or other facility with appropriate resources  Description: INTERVENTIONS:  - Identify barriers to discharge  w/patient and caregiver  - Arrange for needed discharge resources and transportation as appropriate  - Identify discharge learning needs (meds, wound care, etc.)  - Arrange for interpretive services to assist at discharge as needed  - Refer to Case Management Department for coordinating discharge planning if the patient needs post-hospital services based on physician/advanced practitioner order or complex needs related to functional status, cognitive ability, or social support system  Outcome: Progressing     Problem: Knowledge Deficit  Goal: Patient/family/caregiver demonstrates understanding of disease process, treatment plan, medications, and discharge instructions  Description: Complete learning assessment and assess knowledge base.  Interventions:  - Provide teaching at level of understanding  - Provide teaching via preferred learning methods  Outcome: Progressing     Problem: GASTROINTESTINAL - ADULT  Goal: Minimal or absence of nausea and/or vomiting  Description: INTERVENTIONS:  - Administer IV fluids if ordered to ensure adequate hydration  - Maintain NPO status until nausea and vomiting are resolved  - Nasogastric tube if ordered  - Administer ordered antiemetic medications as needed  - Provide nonpharmacologic comfort measures as appropriate  - Advance diet as tolerated, if ordered  - Consider nutrition services referral to assist patient with adequate nutrition and appropriate food choices  Outcome: Progressing  Goal: Maintains or returns to baseline bowel function  Description: INTERVENTIONS:  - Assess bowel function  - Encourage oral fluids to ensure adequate hydration  - Administer IV fluids if ordered to ensure adequate hydration  - Administer ordered medications as needed  - Encourage mobilization and activity  - Consider nutritional services referral to assist patient with adequate nutrition and appropriate food choices  Outcome: Progressing  Goal: Maintains adequate nutritional  intake  Description: INTERVENTIONS:  - Monitor percentage of each meal consumed  - Identify factors contributing to decreased intake, treat as appropriate  - Assist with meals as needed  - Monitor I&O, weight, and lab values if indicated  - Obtain nutrition services referral as needed  Outcome: Progressing  Goal: Establish and maintain optimal ostomy function  Description: INTERVENTIONS:  - Assess bowel function  - Encourage oral fluids to ensure adequate hydration  - Administer IV fluids if ordered to ensure adequate hydration   - Administer ordered medications as needed  - Encourage mobilization and activity  - Nutrition services referral to assist patient with appropriate food choices  - Assess stoma site  - Consider wound care consult   Outcome: Progressing  Goal: Oral mucous membranes remain intact  Description: INTERVENTIONS  - Assess oral mucosa and hygiene practices  - Implement preventative oral hygiene regimen  - Implement oral medicated treatments as ordered  - Initiate Nutrition services referral as needed  Outcome: Progressing

## 2024-11-12 NOTE — ASSESSMENT & PLAN NOTE
per patient's sudden onset of diarrhea with over 10 episodes prior to be in seen at urgent care  no recent travel or antibiotic usage  no further diarrhea or vomiting since admission to hospital  11/11/2024 CT abdomen and pelvis: no acute inflammatory process, concern for hepatic steatosis  workup her primary team

## 2024-11-12 NOTE — ASSESSMENT & PLAN NOTE
"POA with shortness of breath and expiratory wheezing starting this morning  Per patient ran out of home albuterol inhaler  D dimer elevated: 1.07  CTA chest: neg PE. \"Mild centrilobular emphysema. Left lower lobe calcified granuloma. Minimal scarring at the base of the lingula there is no tracheal or endobronchial lesion\"  Continues to smoke a half a pack a day  Noted patient expiratory wheezing and tachypnea, using accessory muscles, & dry cough  Suspect acute COPD exacerbation  Plan  Respiratory protocol  IV Solu-Medrol  40 mg Q8  Pulmonary hygiene  Xopenex as needed  Azithromycin 500 mg every 24  Mucinex q 12 hr  Encourage smoking cessation  "

## 2024-11-12 NOTE — ASSESSMENT & PLAN NOTE
Per patient drinks daily. 40 oz beer daily  Per patient last drink was prior to ED arrival  Denies w/d seizures  Not in acute withdrawal  Ethanol level negative  Continue CIWA protocol   Initiate multivitamin,  thiamine and folic acid  Complete alcohol cessation discussed with patient

## 2024-11-12 NOTE — H&P
"H&P - Hospitalist   Name: Sophie Jamil 59 y.o. female I MRN: [id number]  Unit/Bed#: [address] [place] [address] Date of Admission: 11/11/2024   Date of Service: 11/12/2024 I Hospital Day: 1     Assessment & Plan  New onset a-fib (HCC)  POA with shortness of breath, dizziness, nausea/vomiting and diarrhea that started this morning.  She went to urgent care and she was found that her heart rate 130-150 in the office and EKG revealed  afib with RVR and send to the ED for further evaluation  In ED  found be Rapid afib with RVR Rate 134  In ED received 15 mg of IV Cardizem x 1 & repeat EKG reviewed and interpreted:  normal sinus with rate 87, no ST-T elevation noted  Troponins x 2 negative  [id number] 2  In ED ,CR level 0.79, received weight-based Lovenox SQ for anticoagulation  Suspect new onset A-fib secondary to dehydration and acute COPD exacerbation  Plan  Continue Lovenox sq weight-based  Cardiology consulted appreciate recommendations  Monitor on telemetry  Obtain Echo  Chronic obstructive pulmonary disease with acute exacerbation (HCC)  POA with shortness of breath and expiratory wheezing starting this morning  Per patient ran out of home albuterol inhaler  D dimer elevated: 1.07  CTA chest: neg PE. \"Mild centrilobular emphysema. Left lower lobe calcified granuloma. Minimal scarring at the base of the lingula there is no tracheal or endobronchial lesion\"  Continues to smoke a half a pack a day  Noted patient expiratory wheezing and tachypnea, using accessory muscles, & dry cough  Suspect acute COPD exacerbation  Plan  Respiratory protocol  IV Solu-Medrol  40 mg Q8  Pulmonary hygiene  Xopenex as needed  Azithromycin 500 mg every 24  Mucinex q 12 hr  Encourage smoking cessation  Diarrhea  POA with over 10 episodes of diarrhea and 5 episode of vomiting  started this morning  Denies any recent antibiotic usage or travel  Denies abdominal pain,denies fever, or chills.  States vomiting has improved but still complaining of " diarrhea  In ED, received 1 dose of Imodium  Noted afebrile, leukocytosis WBC 14.64 , procal negative  CT of the abdomen pelvis with contrast:No acute inflammatory process identified in the abdomen or pelvis.  Hepatic steatosis.Stable 3.4 cm left adrenal gland nodule.Colonic diverticulosis without evidence of diverticulitis.  Plan  Obtain stool culture and Cdiff PCR, follow up  Monitor stool with bedside chart  Hold off further doses of imodium  Monitor off antibiotics  Continue with IV hydration: LR at 125 ml/hr  Consider GI consult if diarrhea doesn't resolve  Hyponatremia  Present admission with sodium level 128  Suspect secondary to daily alcohol, dehydration, and diarrhea  In ED received 2 L of LR  Continue IV hydration LR at 125 ml/hr  Obtain urine osmolality, urine sodium and osmolality  Monitor BMP  Nicotine dependence  Smokes 1/2 pk for greater than 50 years  Refuses nicotine patch  Smoking cessation provided     MDD (major depressive disorder)  Mood stable   Home medication Prozac and Zyprexa continued   ETOH abuse  Per patient drinks daily. 40 oz beer daily  Per patient last drink was prior to ED arrival  Denies w/d seizures  Not in acute withdrawal  Ethanol level negative  [facility] protocol   Initiate multivitamin,  thiamine and folic acid  SIRS (systemic inflammatory response syndrome) (HCC)  POA with tachycardia, leukocytosis WBC 14.64, and tachypnea  Suspect reactionary vs infectious versus viral   Procal negative  Plan  Monitor WBC and fever curve  Hold IV antbx  Follow up with stool cultures and Cdiff  Hypomagnesemia  POA with mag level 1.5  In Ed received Mag sulfate 2 g IV  Suspect secondary to diarrhea  Repeat mag in the a.m.  Elevated LFTs  Present on admission with elevated  and ALT 81  Alk phos 102  Total bilirubin 1.5  CT of the abdomen pelvis with contrast:No acute inflammatory process identified in the abdomen or pelvis.  Hepatic steatosis.Stable 3.4 cm left adrenal gland nodule.Colonic  diverticulosis without evidence of diverticulitis.  Monitor LFT  Encourage ETOH cessation  increased anion gap (IAG)  POA with increase anion gap 18, diarrhea and vomiting that started this morning  Blood sugar 145  Patient daily alcohol  Ethanol level negative  Suspect alcoholic ketoacidosis  Continue with aggressive IV hydration LR at 125 ml/hr  Monitor BMP    VTE Pharmacologic Prophylaxis:   High Risk (Score >/= 5) - Pharmacological DVT Prophylaxis Ordered: enoxaparin (Lovenox). Sequential Compression Devices Ordered.  Code Status: Level 1 - Full Code with patient  Discussion with family: Updated  () at bedside.    Anticipated Length of Stay: Patient will be admitted on an inpatient basis with an anticipated length of stay of greater than 2 midnights secondary to new onset of A-fib, COPD exacerbation and diarrhea requiring IV steroids IV fluids and cardiology consulted.    History of Present Illness   Chief Complaint: diarrhea, vomiting, and diarrhea with increase shortness of breath and chest palpations that start this AM    Sophie Jamil is a 59 y.o. female with a PMH of COPD, alcohol abuse, smoker, depression who presents with  diarrhea, vomiting, and diarrhea with increase shortness of breath and chest palpations that start this AM.  Patient states she presented earlier to urgent care and was found to be in rapid A-fib with RVR and was sent to the ED by EMS.  On arrival patient no longer in a flutter and she was noted to be sinus tachycardic at 103.  In ED received 1 dose of IV Cardizem 15 mg.  She also complains of worsening vomiting and diarrhea for that start this morning.  She denies any recent antibiotic usage or recent travel.  She states she also started with wheezing this morning and is out of her home albuterol inhaler.  She states she still smokes 1/2 pk cigarettes per day and daily drinks alcohol.    Review of Systems   Constitutional:  Negative for chills and fever.   HENT:   Negative for ear pain and sore throat.    Eyes:  Negative for pain and visual disturbance.   Respiratory:  Positive for cough, shortness of breath and wheezing.    Cardiovascular:  Positive for chest pain and palpitations.   Gastrointestinal:  Positive for diarrhea, nausea and vomiting. Negative for abdominal pain.   Genitourinary:  Negative for dysuria and hematuria.   Musculoskeletal:  Negative for arthralgias and back pain.   Skin:  Negative for color change and rash.   Neurological:  Negative for seizures and syncope.   All other systems reviewed and are negative.      Historical Information   Past Medical History:   Diagnosis Date    Anxiety     COPD (chronic obstructive pulmonary disease) ([facility])     COPD exacerbation ([facility]) 8/12/2016    Dental abscess 10/2020    Depression     ETOH abuse     Facial abscess 8/12/2016     History reviewed. No pertinent surgical history.  Social History     Tobacco Use    Smoking status: Every Day     Current packs/day: 0.25     Types: Cigarettes    Smokeless tobacco: Never    Tobacco comments:     smokes 1 pack in 3 days   Vaping Use    Vaping status: Never Used   Substance and Sexual Activity    Alcohol use: Not Currently     Comment: states drank today had some beer a couple hours ago    Drug use: No    Sexual activity: Not on file     E-Cigarette/Vaping    E-Cigarette Use Never User      E-Cigarette/Vaping Substances    Nicotine No     THC No     CBD No     Flavoring No     Other No     Unknown No        Social History:  Marital Status: Single   Occupation: Retired  Patient Pre-hospital Living Situation: Home  Patient Pre-hospital Level of Mobility: walks  Patient Pre-hospital Diet Restrictions: none    Meds/Allergies   I have reviewed home medications with patient personally.  Prior to Admission medications    Medication Sig Start Date End Date Taking? Authorizing Provider   albuterol (PROVENTIL HFA,VENTOLIN HFA) 90 mcg/act inhaler Inhale 2 puffs every 6 (six) hours as needed  for wheezing for up to 14 doses  Patient not taking: Reported on 11/11/2024 5/28/21   LADONNA De   ergocalciferol (VITAMIN D2) 50,000 units Take 1 capsule (50,000 Units total) by mouth once a week 3/25/24   LADONNA De   FLUoxetine (PROzac) 20 mg capsule Take 1 capsule (20 mg total) by mouth daily  Patient taking differently: Take 20 mg by mouth daily Patient states she takes 3 capsules daily 12/1/22   Max Power MD   lisinopril (ZESTRIL) 20 mg tablet TAKE 1 TABLET (20 MG TOTAL) BY MOUTH DAILY 6/17/24   LADONNA De   OLANZapine (ZyPREXA) 10 mg tablet Take 1 tablet (10 mg total) by mouth every morning 12/30/22 3/25/24  Serjio Perez PA-C   QUEtiapine (SEROquel) 100 mg tablet Take 2 tablets (200 mg total) by mouth daily at bedtime 12/30/22 3/25/24  Serjio Perez PA-C   rOPINIRole (REQUIP) 0.5 mg tablet Take 1 tablet (0.5 mg total) by mouth daily at bedtime 12/30/22   Serjio Perez PA-C   rosuvastatin (CRESTOR) 20 MG tablet TAKE 1 TABLET (20 MG TOTAL) BY MOUTH DAILY 8/12/24   Jose Briceno MD     Allergies   Allergen Reactions    Bee Venom        Objective :  Temp:  [96.9 °F (36.1 °C)-98.2 °F (36.8 °C)] 96.9 °F (36.1 °C)  HR:  [] 83  BP: (111-156)/() 131/91  Resp:  [18-26] 18  SpO2:  [95 %-99 %] 95 %  O2 Device: None (Room air)    Physical Exam  Vitals and nursing note reviewed.   Constitutional:       General: She is not in acute distress.     Appearance: Normal appearance.   HENT:      Head: Normocephalic.   Cardiovascular:      Rate and Rhythm: Normal rate and regular rhythm.      Heart sounds: No murmur heard.  Pulmonary:      Effort: Tachypnea and accessory muscle usage present.      Breath sounds: Decreased air movement present. Wheezing present. No rhonchi or rales.   Abdominal:      General: Bowel sounds are increased.      Palpations: Abdomen is soft.      Tenderness: There is no abdominal tenderness.   Musculoskeletal:      Right lower leg: No  edema.      Left lower leg: No edema.   Skin:     General: Skin is warm and dry.   Neurological:      Mental Status: She is alert. Mental status is at baseline.   Psychiatric:         Mood and Affect: Mood normal.        Lines/Drains:            Lab Results: I have reviewed the following results:  Results from last 7 days   Lab Units 11/11/24  1705   WBC Thousand/uL 14.64*   HEMOGLOBIN g/dL 13.8   HEMATOCRIT % 40.5   PLATELETS Thousands/uL 287   SEGS PCT % 88*   LYMPHO PCT % 6*   MONO PCT % 5   EOS PCT % 0     Results from last 7 days   Lab Units 11/11/24  1705   SODIUM mmol/L 128*   POTASSIUM mmol/L 3.8   CHLORIDE mmol/L 96   CO2 mmol/L 14*   BUN mg/dL 7   CREATININE mg/dL 0.79   ANION GAP mmol/L 18*   CALCIUM mg/dL 8.7   ALBUMIN g/dL 4.2   TOTAL BILIRUBIN mg/dL 0.75   ALK PHOS U/L 102   ALT U/L 81*   AST U/L 153*   GLUCOSE RANDOM mg/dL 145*             Lab Results   Component Value Date    HGBA1C 5.9 (H) 03/19/2024    HGBA1C 5.6 06/28/2023    HGBA1C 5.3 05/28/2021     Results from last 7 days   Lab Units 11/11/24  2245   PROCALCITONIN ng/ml 0.08       Imaging Results Review: I reviewed radiology reports from this admission including: CTA chest  and CT abdomen/pelvis.  Other Study Results Review: EKG was reviewed.  EKG was personally reviewed and my interpretation is: Personally Reviewed. NSR. HR 87..    Administrative Statements       ** Please Note: This note has been constructed using a voice recognition system. **

## 2024-11-12 NOTE — UTILIZATION REVIEW
Initial Clinical Review    Admission: Date/Time/Statement:   Admission Orders (From admission, onward)       Ordered        11/11/24 2128  INPATIENT ADMISSION  Once                          Orders Placed This Encounter   Procedures    INPATIENT ADMISSION     Standing Status:   Standing     Number of Occurrences:   1     Order Specific Question:   Level of Care     Answer:   Med Surg [16]     Order Specific Question:   Estimated length of stay     Answer:   More than 2 Midnights     Order Specific Question:   Certification     Answer:   I certify that inpatient services are medically necessary for this patient for a duration of greater than two midnights. See H&P and MD Progress Notes for additional information about the patient's course of treatment.     ED Arrival Information       Expected   -    Arrival   11/11/2024 16:48    Acuity   Urgent              Means of arrival   Walk-In    Escorted by   [facility]    Service   Hospitalist    Admission type   Emergency              Arrival complaint   Weakness             Chief Complaint   Patient presents with    Rapid Heart Rate     Patient states she started this morning with diarrhea, vomiting, dizziness, and SOB.  Went to PMD and was found to have a HR ranging .  Patient denies any pain.       Initial Presentation:  59 yof to ER from Urgent Care via EMS for diarrhea, vomiting, and diarrhea with increase shortness of breath and chest palpations that start this AM. Patient states she presented earlier to urgent care and was found to be in rapid A-fib with RVR. In ED  found be Rapid afib with RVR Rate 134. In ED received 15 mg of IV Cardizem x 1 & repeat EKG reviewed and interpreted:  normal sinus with rate 87, no ST-T elevation noted.  Hx COPD, alcohol abuse, smoker, depression.  Presents SOB using accessory muscles, decreased air movement, wheezing, decreased BS. Admission imaging neg. Labs: WBC 14.64, Na 128, CO2 14, anion gap 18, Mg 1.5, ast 153, alt  81, d-dimer 1.07.  Admitted to inpatient status for new onset Afib suspected 2nd dehydration and acute COPD exacerbation. Started on IVF, IV steroids, abt.    Anticipated Length of Stay/Certification Statement:   Patient will be admitted on an inpatient basis with an anticipated length of stay of greater than 2 midnights secondary to new onset of A-fib, COPD exacerbation and diarrhea requiring IV steroids IV fluids and cardiology consulted.     Date: 11/12/24   Day 2:   New onset afib POA, now converted to SR. Cardio following. IV steroids continued for COPD exacerbation. Lungs with diminished breath sounds, rhonchi, expiratory wheezing, strong productive cough. Currently on RA, monitor.     Per cardio: new onset atrial fibrillation in the setting of electrolyte imbalance, nausea, vomiting and diarrhea   EKG: initial 12 lead EKG demonstrated rapid atrial fibrillation at a rate of 139 bpm with nonspecific T wave abnormalities. After receiving one dose of IV Cardizem in the emergency room patient successfully converted back to sinus rhythm [id number] score = 2, or 2.2% risk of stroke per year, patient is currently on subcu Lovenox weight based, recommend conversion to factor X a inhibitor at time of discharge due to her risk factors. Discuss with patient would recommend short course of anticoagulation due to her risk of stroke with monitoring for recurrence of atrial fibrillation. Encourage smoking and alcohol cessation  EKG: initial 12 lead EKG demonstrated rapid atrial fibrillation at a rate of 139 bpm with nonspecific T wave abnormalities. After receiving one dose of IV Cardizem in the emergency room patient successfully converted back to sinus rhythm     ED Treatment-Medication Administration from 11/11/2024 1648 to 11/11/2024 2312         Date/Time Order Dose Route Action     11/11/2024 1920 sodium chloride 0.9 % bolus 1,000 mL 1,000 mL Intravenous New Bag     11/11/2024 1953 iohexol (OMNIPAQUE) 350 MG/ML  injection (MULTI-DOSE) 100 mL 100 mL Intravenous Given     11/11/2024 2137 enoxaparin (LOVENOX) subcutaneous injection 70 mg 70 mg Subcutaneous Given     11/11/2024 2157 loperamide (IMODIUM) capsule 2 mg 2 mg Oral Given     11/11/2024 2254 sodium chloride 0.9 % infusion 100 mL/hr Intravenous New Bag     11/11/2024 2257 guaiFENesin (MUCINEX) 12 hr tablet 600 mg 600 mg Oral Given     11/11/2024 2256 azithromycin (ZITHROMAX) tablet 500 mg 500 mg Oral Given     11/11/2024 2249 iohexol (OMNIPAQUE) 350 MG/ML injection (MULTI-DOSE) 70 mL 70 mL Intravenous Given            Scheduled Medications:  Medications 11/03 11/04 11/05 11/06 11/07 11/08 11/09 11/10 11/11 11/12   atorvastatin (LIPITOR) tablet 40 mg  Dose: 40 mg  Freq: Daily with dinner Route: PO  Start: 11/12/24 1630             1630        azithromycin (ZITHROMAX) tablet 500 mg  Dose: 500 mg  Freq: Every 24 hours Route: PO  Start: 11/11/24 2215 End: 11/14/24 2214   Order specific questions:               [id number]        diltiazem (CARDIZEM) injection 15 mg  Dose: 15 mg  Freq: Once Route: IV  Start: 11/11/24 1800   Admin Instructions:               (1911) [C]         diltiazem (CARDIZEM) injection 20 mg  Dose: 20 mg  Freq: Once Route: IV  Start: 11/11/24 1830 End: 11/11/24 1819   Admin Instructions:               1819-D/C'd  (1912)         enoxaparin (LOVENOX) subcutaneous injection 70 mg  Dose: 1 mg/kg  Weight Dosing Info: 68 kg  Freq: Every 12 hours scheduled Route: SC  Start: 11/12/24 0900   Admin Instructions:                1121     2100        enoxaparin (LOVENOX) subcutaneous injection 70 mg  Dose: 1 mg/kg  Weight Dosing Info: 68 kg  Freq: Once Route: SC  Start: 11/11/24 2130 End: 11/11/24 2137   Admin Instructions:               2137         FLUoxetine (PROzac) capsule 20 mg  Dose: 20 mg  Freq: Daily Route: PO  Start: 11/12/24 0900   Admin Instructions:                1121        folic acid (FOLVITE) tablet 1 mg  Dose: 1 mg  Freq: Daily Route: PO  Start:  11/12/24 0900             1121        guaiFENesin (MUCINEX) 12 hr tablet 600 mg  Dose: 600 mg  Freq: 2 times daily Route: PO  Start: 11/11/24 2215   Admin Instructions:               2257      1121     1800        lisinopril (ZESTRIL) tablet 20 mg  Dose: 20 mg  Freq: Daily Route: PO  Start: 11/12/24 0900   Admin Instructions:      Order specific questions:                1121        loperamide (IMODIUM) capsule 2 mg  Dose: 2 mg  Freq: Once Route: PO  Start: 11/11/24 2145 End: 11/11/24 2157   Admin Instructions:               2157         magnesium sulfate 2 g/50 mL IVPB (premix) 2 g  Dose: 2 g  Freq: Once Route: IV  Last Dose: Stopped (11/12/24 0827)  Start: 11/12/24 0015 End: 11/12/24 0827   Admin Instructions:                0028 0827        methylPREDNISolone sodium succinate (Solu-MEDROL) injection 40 mg  Dose: 40 mg  Freq: Every 8 hours Route: IV  Start: 11/11/24 2215 0038     0816     1600        multivitamin-minerals (CENTRUM) tablet 1 tablet  Dose: 1 tablet  Freq: Daily Route: PO  Start: 11/12/24 0900   Admin Instructions:                1121        nicotine (NICODERM CQ) 7 mg/24hr TD 24 hr patch 1 patch  Dose: 1 patch  Freq: Daily Route: TD  Start: 11/12/24 0900   Admin Instructions:                1122        OLANZapine (ZyPREXA) tablet 10 mg  Dose: 10 mg  Freq: Every morning Route: PO  Start: 11/12/24 0900   Admin Instructions:                1119        potassium chloride (Klor-Con M20) CR tablet 40 mEq  Dose: 40 mEq  Freq: Once Route: PO  Start: 11/12/24 0915 End: 11/12/24 1120   Admin Instructions:                1120        rOPINIRole (REQUIP) tablet 0.5 mg  Dose: 0.5 mg  Freq: Daily at bedtime Route: PO  Start: 11/11/24 2215 0038     2200        sodium chloride 0.9 % bolus 1,000 mL  Dose: 1,000 mL  Freq: Once Route: IV  Last Dose: Stopped (11/11/24 2242)  Start: 11/11/24 1930 End: 11/11/24 2242 1920 2242         thiamine tablet 100 mg  Dose: 100 mg  Freq:  Daily Route: PO  Start: 11/12/24 0900             1121                    Continuous Meds Sorted by Name  for Sophie Jamil as of 11/03/24 through 11/12/24  Legend:       Medications 11/03 11/04 11/05 11/06 11/07 11/08 11/09 11/10 11/11 11/12   lactated ringers infusion  Rate: 125 mL/hr Dose: 125 mL/hr  Freq: Continuous Route: IV  Indications of Use: IV Hydration  Last Dose: 125 mL/hr (11/12/24 0815)  Start: 11/12/24 0015 End: 11/12/24 0902             0016     0815     0902-D/C'd      sodium chloride 0.9 % infusion  Rate: 100 mL/hr Dose: 100 mL/hr  Freq: Continuous Route: IV  Indications of Use: IV Hydration  Last Dose: Stopped (11/12/24 0827)  Start: 11/11/24 2215 End: 11/12/24 0000            2254      0000-D/C'd  0827        Legend:       [id number]/0711/0811/0911/1011/1111/12        PRN Meds Sorted by Name  for Sophie Jamil as of 11/03/24 through 11/12/24  Legend:       Medications 11/03 11/04 11/05 11/06 11/07 11/08 11/09 11/10 11/11 11/12   acetaminophen (TYLENOL) tablet 650 mg  Dose: 650 mg  Freq: Every 6 hours PRN Route: PO  PRN Reason: mild pain  Indications of Use: FEVER,HEADACHE,MILD PAIN  Start: 11/11/24 2214                iohexol (OMNIPAQUE) 350 MG/ML injection (MULTI-DOSE) 100 mL  Dose: 100 mL  Freq: Once in imaging Route: IV  PRN Reason: contrast  Start: 11/11/24 1952 End: 11/11/24 1953 1953         iohexol (OMNIPAQUE) 350 MG/ML injection (MULTI-DOSE) 70 mL  Dose: 70 mL  Freq: Once in imaging Route: IV  PRN Reason: contrast  Start: 11/11/24 2249 End: 11/11/24 2249 2249          levalbuterol (XOPENEX) inhalation solution 1.25 mg  Dose: 1.25 mg  Freq: Every 6 hours PRN Route: NEBULIZATION  PRN Reasons: wheezing,shortness of breath  Start: 11/11/24 2214             1419        And   sodium chloride 0.9 % inhalation solution 3 mL  Dose: 3 mL  Freq: Every 6 hours PRN Route: NEBULIZATION  PRN Reasons: wheezing,shortness of breath  Start: 11/11/24 2214                 ondansetron (ZOFRAN) injection 4 mg  Dose: 4 mg  Freq: Every 6 hours PRN Route: IV  PRN Reasons: nausea,vomiting  Start: 11/11/24 2214   Admin Instructions:                                   ED Triage Vitals [11/11/24 1658]   Temperature Pulse Respirations Blood Pressure SpO2 Pain Score   98.2 °F (36.8 °C) 90 19 111/83 99 % No Pain     Weight (last 2 days)       Date/Time Weight    11/12/24 0920 72.6 (160)    11/11/24 23:22:59 72.7 (160.27)            Vital Signs (last 3 days)       Date/Time Temp Pulse Resp BP MAP (mmHg) SpO2 O2 Device Patient Position - Orthostatic VS CIWA-Ar Total Pain    11/12/24 1427 -- -- -- -- -- 96 % None (Room air) -- -- --    11/12/24 1005 -- -- -- -- -- -- -- -- -- No Pain    11/12/24 0920 -- 84 -- 133/88 -- -- -- -- -- --    11/12/24 0823 97.2 °F (36.2 °C) 84 18 133/88 103 96 % None (Room air) Lying 4 No Pain    11/12/24 08:22:43 97.2 °F (36.2 °C) 84 -- 133/88 103 94 % -- -- -- --    11/12/24 04:56:30 98 °F (36.7 °C) 83 -- 134/89 104 95 % -- -- 0 --    11/12/24 0334 -- -- 18 -- -- -- None (Room air) -- -- --    11/12/24 00:53:43 -- 83 18 131/91 104 95 % -- -- 2 --    11/12/24 0003 -- -- -- -- -- 95 % None (Room air) -- -- --    11/11/24 2345 -- -- -- -- -- -- None (Room air) -- -- No Pain    11/11/24 23:22:59 96.9 °F (36.1 °C) 83 18 128/95 106 95 % -- -- -- --    11/11/24 2321 -- -- -- -- -- -- -- -- -- No Pain    11/11/24 2141 -- -- -- -- -- -- None (Room air) -- -- --    11/11/24 2130 -- 85 26 148/111 125 95 % None (Room air) Lying -- --    11/11/24 2100 -- 83 26 145/71 98 97 % None (Room air) Lying -- --    11/11/24 1930 -- 83 26 156/84 106 97 % None (Room air) Lying -- --    11/11/24 1915 -- 81 26 -- -- 97 % -- -- -- --    11/11/24 1900 -- 85 19 148/71 102 98 % None (Room air) Sitting -- --    11/11/24 1830 -- 86 19 139/64 89 98 % None (Room air) Sitting -- --    11/11/24 1745 -- 136 19 134/64 92 98 % None (Room air) Sitting -- --    11/11/24 1700 -- 84 19 147/74 102 99 % None  (Room air) Sitting -- --    11/11/24 1658 98.2 °F (36.8 °C) 90 19 111/83 -- 99 % None (Room air) Sitting -- No Pain           CIWA-Ar Score       Row Name 11/12/24 0823 11/12/24 04:56:30          CIWA-Ar    Nausea and Vomiting 0 0     Tactile Disturbances 0 0     Tremor 3 0     Auditory Disturbances 0 0     Paroxysmal Sweats 0 0     Visual Disturbances 0 0     Anxiety 1 0     Headache, Fullness in Head 0 0     Agitation 0 0     Orientation and Clouding of Sensorium 0 0     CIWA-Ar Total 4 0                     Pertinent Labs/Diagnostic Test Results:   Radiology:  CTA chest pe study   Final Interpretation by Hamlet Lawrence MD (11/11 2336)      No evidence of acute pulmonary embolus, thoracic aortic aneurysm or dissection. No acute cardiopulmonary process.                  Workstation performed: [id number]         CT abdomen pelvis with contrast   Final Interpretation by Mohsen Dial MD (11/11 2116)      No acute inflammatory process identified in the abdomen or pelvis.      Hepatic steatosis.      Stable 3.4 cm left adrenal gland nodule.      Colonic diverticulosis without evidence of diverticulitis.               Workstation performed: [id number]         XR chest 1 view portable   ED Interpretation by Marsha Anton DO (11/11 1843)   nad      Final Interpretation by Gurjit Braden MD (11/11 1845)      No acute cardiopulmonary disease.            Workstation performed: [id number]           Cardiology:  Echo complete w/ contrast if indicated   Final Result by Fei Trejo MD (11/12 5037)        Left Ventricle: Left ventricular cavity size is normal. Wall thickness    is normal. The left ventricular ejection fraction is 60%. Systolic    function is normal. Wall motion is normal. Diastolic function is normal.     Right Ventricle: Systolic function is normal. Normal tricuspid annular    plane systolic excursion (TAPSE) > 1.7 cm.     Left Atrium: The atrium is normal in size.     Right Atrium: The atrium is  "normal in size.     Mitral Valve: There is trace regurgitation.         ECG 12 lead   Final Result by Yunior Dave MD (11/12 0832)   Normal sinus rhythm   Left atrial enlargement   Nonspecific ST abnormality   Prolonged QT   Abnormal ECG      Confirmed by Yunior Dave ([id number]) on 11/12/2024 8:32:55 AM      ECG 12 lead   Final Result by Yunior Dave MD (11/12 0834)   Atrial fibrillation with rapid ventricular response   Nonspecific ST and T wave abnormality   Abnormal ECG   Confirmed by Yunior Dave ([id number]) on 11/12/2024 8:34:43 AM      ECG 12 lead   Final Result by Yunior Dave MD (11/12 0835)   Sinus tachycardia with frequent and consecutive Premature ventricular    complexes   ST & T wave abnormality, consider inferior ischemia   Abnormal ECG      Confirmed by Yunior Dave ([id number]) on 11/12/2024 8:35:21 AM        GI:  No orders to display           Results from last 7 days   Lab Units 11/12/24  0450 11/11/24  1705   WBC Thousand/uL 11.85* 14.64*   HEMOGLOBIN g/dL 13.0 13.8   HEMATOCRIT % 38.0 40.5   PLATELETS Thousands/uL 266 287   TOTAL NEUT ABS Thousands/µL 11.25* 12.86*         Results from last 7 days   Lab Units 11/12/24  0450 11/11/24  1705   SODIUM mmol/L 135 128*   POTASSIUM mmol/L 3.6 3.8   CHLORIDE mmol/L 105 96   CO2 mmol/L 18* 14*   ANION GAP mmol/L 12 18*   BUN mg/dL 4* 7   CREATININE mg/dL 0.64 0.79   EGFR ml/min/1.73sq m 96 81   CALCIUM mg/dL 8.7 8.7   MAGNESIUM mg/dL 2.8* 1.5*     Results from last 7 days   Lab Units 11/11/24  1705   AST U/L 153*   ALT U/L 81*   ALK PHOS U/L 102   TOTAL PROTEIN g/dL 7.2   ALBUMIN g/dL 4.2   TOTAL BILIRUBIN mg/dL 0.75         Results from last 7 days   Lab Units 11/12/24  0450 11/11/24  1705   GLUCOSE RANDOM mg/dL 139 145*     Results from last 7 days   Lab Units 11/12/24  0057   OSMOLALITY, SERUM mmol/         No results found for: \"BETA-HYDROXYBUTYRATE\"                   Results from last 7 days   Lab Units 11/11/24 1919 11/11/24  1705   HS TNI 0HR ng/L  --  " 13   HS TNI 2HR ng/L 13  --    HSTNI D2 ng/L 0  --      Results from last 7 days   Lab Units 11/11/24  1919   D-DIMER QUANTITATIVE ug/ml FEU 1.07*         Results from last 7 days   Lab Units 11/11/24  2245   TSH 3RD GENERATON uIU/mL 1.981     Results from last 7 days   Lab Units 11/11/24  2245   PROCALCITONIN ng/ml 0.08                 Results from last 7 days   Lab Units 11/11/24  1705   BNP pg/mL 78                                 Results from last 7 days   Lab Units 11/12/24  0247 11/12/24  0057   OSMOLALITY, SERUM mmol/KG  --  285   OSMO UR mmol/*  --      Results from last 7 days   Lab Units 11/12/24  0247   SODIUM UR  21                 Results from last 7 days   Lab Units 11/12/24  0057   ETHANOL LVL mg/dL <10     Results from last 7 days   Lab Units 11/11/24  2238   C DIFF TOXIN B BY PCR  Negative                               Past Medical History:   Diagnosis Date    Anxiety     COPD (chronic obstructive pulmonary disease) (HCC)     COPD exacerbation (HCC) 8/12/2016    Dental abscess 10/2020    Depression     ETOH abuse     Facial abscess 8/12/2016     Present on Admission:   ETOH abuse   Nicotine dependence   Chronic obstructive pulmonary disease with acute exacerbation (HCC)   MDD (major depressive disorder)   New onset a-fib (HCC)   Diarrhea      Admitting Diagnosis: Diarrhea [R19.7]  Hyponatremia [E87.1]  Rapid heart rate [R00.0]  Vomiting and diarrhea [R11.10, R19.7]  Atrial fibrillation, new onset (HCC) [I48.91]  Age/Sex: 59 y.o. female    Network Utilization Review Department  ATTENTION: Please call with any questions or concerns to [phone] and carefully listen to the prompts so that you are directed to the right person. All voicemails are confidential.   For Discharge needs, contact Care Management DC Support Team at [phone] opt. 2  Send all requests for admission clinical reviews, approved or denied determinations and any other requests to dedicated fax number below belonging to  the [place] where the patient is receiving treatment. List of dedicated fax numbers for the Facilities:  FACILITY NAME UR FAX NUMBER   ADMISSION DENIALS (Administrative/Medical Necessity) [phone]   DISCHARGE SUPPORT TEAM (NETWORK) [phone]   PARENT CHILD HEALTH (Maternity/NICU/Pediatrics) [phone]   [facility] [phone]   [facility] [phone]   [facility] [phone]   [facility] [phone]   [facility] [phone]   [facility] [phone]   [facility] [phone]   [facility] [phone]   [facility] [phone]   [facility] [phone]   [facility] [phone]   [facility] [phone]

## 2024-11-12 NOTE — ASSESSMENT & PLAN NOTE
POA with over 10 episodes of diarrhea and 5 episode of vomiting  started this morning  Denies any recent antibiotic usage or travel  Denies abdominal pain,denies fever, or chills.  States vomiting has improved but still complaining of diarrhea  In ED, received 1 dose of Imodium  Noted afebrile, leukocytosis WBC 14.64 , procal negative  CT of the abdomen pelvis with contrast:No acute inflammatory process identified in the abdomen or pelvis.  Hepatic steatosis.Stable 3.4 cm left adrenal gland nodule.Colonic diverticulosis without evidence of diverticulitis.  Plan  Obtain stool culture and Cdiff PCR, follow up  Monitor stool with bedside chart  Hold off further doses of imodium  Monitor off antibiotics  Continue with IV hydration: LR at 125 ml/hr  Consider GI consult if diarrhea doesn't resolve

## 2024-11-12 NOTE — ASSESSMENT & PLAN NOTE
Per patient drinks daily. 40 oz beer daily  Per patient last drink was prior to ED arrival  Denies w/d seizures  Not in acute withdrawal  Ethanol level negative  CIWA protocol   Initiate multivitamin,  thiamine and folic acid

## 2024-11-12 NOTE — ASSESSMENT & PLAN NOTE
POA with increase anion gap 18, diarrhea and vomiting that started this morning  Blood sugar 145  Patient daily alcohol  Ethanol level negative  Suspect alcoholic ketoacidosis  Continue with aggressive IV hydration LR at 125 ml/hr  Monitor BMP

## 2024-11-12 NOTE — ASSESSMENT & PLAN NOTE
"POA with shortness of breath and expiratory wheezing starting the morning of admission  Per patient ran out of home albuterol inhaler  D dimer elevated: 1.07  CTA chest: neg PE. \"Mild centrilobular emphysema. Left lower lobe calcified granuloma. Minimal scarring at the base of the lingula there is no tracheal or endobronchial lesion\"  Continues to smoke a half a pack a day  Suspect acute COPD exacerbation  Continue IV Solu-Medrol  40 mg Q8 for today  Start scheduled Xopenex and ipratropium along with Xopenex and ipratropium as needed  Azithromycin 500 mg every 24  Mucinex q 12 hr  Encouraged smoking cessation today  "

## 2024-11-12 NOTE — PLAN OF CARE
Problem: PAIN - ADULT  Goal: Verbalizes/displays adequate comfort level or baseline comfort level  Description: Interventions:  - Encourage patient to monitor pain and request assistance  - Assess pain using appropriate pain scale  - Administer analgesics based on type and severity of pain and evaluate response  - Implement non-pharmacological measures as appropriate and evaluate response  - Consider cultural and social influences on pain and pain management  - Notify physician/advanced practitioner if interventions unsuccessful or patient reports new pain  Outcome: Progressing     Problem: INFECTION - ADULT  Goal: Absence or prevention of progression during hospitalization  Description: INTERVENTIONS:  - Assess and monitor for signs and symptoms of infection  - Monitor lab/diagnostic results  - Monitor all insertion sites, i.e. indwelling lines, tubes, and drains  Problem: SAFETY ADULT  Goal: Patient will remain free of falls  Description: INTERVENTIONS:  - Educate patient/family on patient safety including physical limitations  - Instruct patient to call for assistance with activity   - Consult OT/PT to assist with strengthening/mobility   - Keep Call bell within reach  - Keep bed low and locked with side rails adjusted as appropriate  - Keep care items and personal belongings within reach  - Initiate and maintain comfort rounds  - Make Fall Risk Sign visible to staff  - Offer Toileting every 2  Problem: DISCHARGE PLANNING  Goal: Discharge to home or other facility with appropriate resources  Description: INTERVENTIONS:  - Identify barriers to discharge w/patient and caregiver  - Arrange for needed discharge resources and transportation as appropriate  - Identify discharge learning needs (meds, wound care, etc.)  - Arrange for interpretive services to assist at discharge as needed  - Refer to Case Management Department for coordinating discharge planning if the patient needs post-hospital services based on  physician/advanced practitioner order or complex needs related to functional status, cognitive ability, or social support system  Outcome: Progressing     Problem: Knowledge Deficit  Goal: Patient/family/caregiver demonstrates understanding of disease process, treatment plan, medications, and discharge instructions  Description: Complete learning assessment and assess knowledge base.  Interventions:  - Provide teaching at level of understanding  - Provide teaching via preferred learning methods  Outcome: Progressing     Problem: GASTROINTESTINAL - ADULT  Goal: Minimal or absence of nausea and/or vomiting  Description: INTERVENTIONS:  - Administer IV fluids if ordered to ensure adequate hydration  - Maintain NPO status until nausea and vomiting are resolved  - Nasogastric tube if ordered  - Administer ordered antiemetic medications as needed  - Provide nonpharmacologic comfort measures as appropriate  - Advance diet as tolerated, if ordered  - Consider nutrition services referral to assist patient with adequate nutrition and appropriate food choices  Outcome: Progressing  Goal: Maintains or returns to baseline bowel function  Description: INTERVENTIONS:  - Assess bowel function  - Encourage oral fluids to ensure adequate hydration  - Administer IV fluids if ordered to ensure adequate hydration  - Administer ordered medications as needed  - Encourage mobilization and activity  - Consider nutritional services referral to assist patient with adequate nutrition and appropriate food choices  Outcome: Progressing  Goal: Maintains adequate nutritional intake  Description: INTERVENTIONS:  - Monitor percentage of each meal consumed  - Identify factors contributing to decreased intake, treat as appropriate  - Assist with meals as needed  - Monitor I&O, weight, and lab values if indicated  - Obtain nutrition services referral as needed  Outcome: Progressing  Goal: Oral mucous membranes remain intact  Description:  INTERVENTIONS  - Assess oral mucosa and hygiene practices  - Implement preventative oral hygiene regimen  - Implement oral medicated treatments as ordered  - Initiate Nutrition services referral as needed  Outcome: Progressing     Problem: RESPIRATORY - ADULT  Goal: Achieves optimal ventilation and oxygenation  Description: INTERVENTIONS:  - Assess for changes in respiratory status  - Assess for changes in mentation and behavior  - Position to facilitate oxygenation and minimize respiratory effort  - Oxygen administered by appropriate delivery if ordered  - Initiate smoking cessation education as indicated  - Encourage broncho-pulmonary hygiene including cough, deep breathe, Incentive Spirometry  - Assess the need for suctioning and aspirate as needed  - Assess and instruct to report SOB or any respiratory difficulty  - Respiratory Therapy support as indicated  Outcome: Progressing     Problem: Prexisting or High Potential for Compromised Skin Integrity  Goal: Skin integrity is maintained or improved  Description: INTERVENTIONS:  - Identify patients at risk for skin breakdown  - Assess and monitor skin integrity  - Assess and monitor nutrition and hydration status  - Monitor labs   - Assess for incontinence   - Turn and reposition patient  - Assist with mobility/ambulation  - Relieve pressure over bony prominences  - Avoid friction and shearing  - Provide appropriate hygiene as needed including keeping skin clean and dry  - Evaluate need for skin moisturizer/barrier cream  - Collaborate with interdisciplinary team   - Patient/family teaching  - Consider wound care consult   Outcome: Progressing    Hours, in advance of need  - Initiate/Maintain bed alarm  - Apply yellow socks and bracelet for high fall risk patients  - Consider moving patient to room near nurses station  Outcome: Progressing  Goal: Maintain or return to baseline ADL function  Description: INTERVENTIONS:  -  Assess patient's ability to carry out ADLs;  assess patient's baseline for ADL function and identify physical deficits which impact ability to perform ADLs (bathing, care of mouth/teeth, toileting, grooming, dressing, etc.)  - Assess/evaluate cause of self-care deficits   - Assess range of motion  - Assess patient's mobility; develop plan if impaired  - Assess patient's need for assistive devices and provide as appropriate  - Encourage maximum independence but intervene and supervise when necessary  - Involve family in performance of ADLs  - Assess for home care needs following discharge   - Consider OT consult to assist with ADL evaluation and planning for discharge  - Provide patient education as appropriate  Outcome: Progressing  Goal: Maintains/Returns to pre admission functional level  Description: INTERVENTIONS:  - Perform AM-PAC 6 Click Basic Mobility/ Daily Activity assessment daily.  - Set and communicate daily mobility goal to care team and patient/family/caregiver.   - Collaborate with rehabilitation services on mobility goals if consulted  - Out of bed for meals 3 times a day  - Out of bed for toileting  - Record patient progress and toleration of activity level   Outcome: Progressing     - [place] appropriate cooling/warming therapies per order  - Administer medications as ordered  - Instruct and encourage patient and family to use good hand hygiene technique  - Identify and instruct in appropriate isolation precautions for identified infection/condition  Outcome: Progressing

## 2024-11-12 NOTE — ASSESSMENT & PLAN NOTE
POA with mag level 1.5  In Ed received Mag sulfate 2 g IV  Suspect secondary to diarrhea  Repeat mag in the a.m.

## 2024-11-13 LAB
ANION GAP SERPL CALCULATED.3IONS-SCNC: 8 MMOL/L (ref 4–13)
ATRIAL RATE: 68 BPM
ATRIAL RATE: 69 BPM
BUN SERPL-MCNC: 5 MG/DL (ref 5–25)
CALCIUM SERPL-MCNC: 9 MG/DL (ref 8.4–10.2)
CHLORIDE SERPL-SCNC: 107 MMOL/L (ref 96–108)
CO2 SERPL-SCNC: 24 MMOL/L (ref 21–32)
CREAT SERPL-MCNC: 0.59 MG/DL (ref 0.6–1.3)
ERYTHROCYTE [DISTWIDTH] IN BLOOD BY AUTOMATED COUNT: 13.4 % (ref 11.6–15.1)
GFR SERPL CREATININE-BSD FRML MDRD: 99 ML/MIN/1.73SQ M
GLUCOSE SERPL-MCNC: 158 MG/DL (ref 65–140)
HCT VFR BLD AUTO: 34 % (ref 34.8–46.1)
HGB BLD-MCNC: 11.2 G/DL (ref 11.5–15.4)
MAGNESIUM SERPL-MCNC: 2.3 MG/DL (ref 1.9–2.7)
MCH RBC QN AUTO: 32.6 PG (ref 26.8–34.3)
MCHC RBC AUTO-ENTMCNC: 32.9 G/DL (ref 31.4–37.4)
MCV RBC AUTO: 99 FL (ref 82–98)
P AXIS: 64 DEGREES
P AXIS: 64 DEGREES
PLATELET # BLD AUTO: 250 THOUSANDS/UL (ref 149–390)
PMV BLD AUTO: 10.2 FL (ref 8.9–12.7)
POTASSIUM SERPL-SCNC: 3.9 MMOL/L (ref 3.5–5.3)
PR INTERVAL: 134 MS
PR INTERVAL: 134 MS
QRS AXIS: 10 DEGREES
QRS AXIS: 15 DEGREES
QRS AXIS: 29 DEGREES
QRSD INTERVAL: 70 MS
QRSD INTERVAL: 72 MS
QRSD INTERVAL: 74 MS
QT INTERVAL: 284 MS
QT INTERVAL: 446 MS
QT INTERVAL: 452 MS
QTC INTERVAL: 402 MS
QTC INTERVAL: 478 MS
QTC INTERVAL: 481 MS
RBC # BLD AUTO: 3.44 MILLION/UL (ref 3.81–5.12)
SODIUM SERPL-SCNC: 139 MMOL/L (ref 135–147)
T WAVE AXIS: 222 DEGREES
T WAVE AXIS: 44 DEGREES
T WAVE AXIS: 56 DEGREES
VENTRICULAR RATE: 120 BPM
VENTRICULAR RATE: 68 BPM
VENTRICULAR RATE: 69 BPM
WBC # BLD AUTO: 15.66 THOUSAND/UL (ref 4.31–10.16)

## 2024-11-13 PROCEDURE — 94640 AIRWAY INHALATION TREATMENT: CPT

## 2024-11-13 PROCEDURE — 80048 BASIC METABOLIC PNL TOTAL CA: CPT | Performed by: FAMILY MEDICINE

## 2024-11-13 PROCEDURE — 99232 SBSQ HOSP IP/OBS MODERATE 35: CPT | Performed by: INTERNAL MEDICINE

## 2024-11-13 PROCEDURE — 85027 COMPLETE CBC AUTOMATED: CPT | Performed by: FAMILY MEDICINE

## 2024-11-13 PROCEDURE — 93010 ELECTROCARDIOGRAM REPORT: CPT | Performed by: INTERNAL MEDICINE

## 2024-11-13 PROCEDURE — 94664 DEMO&/EVAL PT USE INHALER: CPT

## 2024-11-13 PROCEDURE — 94668 MNPJ CHEST WALL SBSQ: CPT

## 2024-11-13 PROCEDURE — 83735 ASSAY OF MAGNESIUM: CPT | Performed by: FAMILY MEDICINE

## 2024-11-13 PROCEDURE — 93005 ELECTROCARDIOGRAM TRACING: CPT

## 2024-11-13 PROCEDURE — 94760 N-INVAS EAR/PLS OXIMETRY 1: CPT

## 2024-11-13 RX ORDER — METOPROLOL TARTRATE 25 MG/1
25 TABLET, FILM COATED ORAL EVERY 12 HOURS SCHEDULED
Status: DISCONTINUED | OUTPATIENT
Start: 2024-11-13 | End: 2024-11-14

## 2024-11-13 RX ORDER — DILTIAZEM HYDROCHLORIDE 5 MG/ML
15 INJECTION INTRAVENOUS ONCE
Status: DISCONTINUED | OUTPATIENT
Start: 2024-11-13 | End: 2024-11-16 | Stop reason: HOSPADM

## 2024-11-13 RX ADMIN — OLANZAPINE 10 MG: 2.5 TABLET, FILM COATED ORAL at 09:53

## 2024-11-13 RX ADMIN — IPRATROPIUM BROMIDE 0.5 MG: 0.5 SOLUTION RESPIRATORY (INHALATION) at 19:57

## 2024-11-13 RX ADMIN — GUAIFENESIN 600 MG: 600 TABLET, EXTENDED RELEASE ORAL at 17:09

## 2024-11-13 RX ADMIN — METHYLPREDNISOLONE SODIUM SUCCINATE 40 MG: 40 INJECTION, POWDER, FOR SOLUTION INTRAMUSCULAR; INTRAVENOUS at 16:53

## 2024-11-13 RX ADMIN — METOPROLOL TARTRATE 25 MG: 25 TABLET, FILM COATED ORAL at 09:53

## 2024-11-13 RX ADMIN — IPRATROPIUM BROMIDE 0.5 MG: 0.5 SOLUTION RESPIRATORY (INHALATION) at 15:22

## 2024-11-13 RX ADMIN — ROPINIROLE 0.5 MG: 0.25 TABLET, FILM COATED ORAL at 21:04

## 2024-11-13 RX ADMIN — FOLIC ACID 1 MG: 1 TABLET ORAL at 09:53

## 2024-11-13 RX ADMIN — Medication 1 TABLET: at 09:53

## 2024-11-13 RX ADMIN — ATORVASTATIN CALCIUM 40 MG: 40 TABLET, FILM COATED ORAL at 16:54

## 2024-11-13 RX ADMIN — NYSTATIN: 100000 POWDER TOPICAL at 17:10

## 2024-11-13 RX ADMIN — METHYLPREDNISOLONE SODIUM SUCCINATE 40 MG: 40 INJECTION, POWDER, FOR SOLUTION INTRAMUSCULAR; INTRAVENOUS at 01:17

## 2024-11-13 RX ADMIN — NICOTINE 1 PATCH: 7 PATCH, EXTENDED RELEASE TRANSDERMAL at 09:54

## 2024-11-13 RX ADMIN — LISINOPRIL 20 MG: 20 TABLET ORAL at 09:54

## 2024-11-13 RX ADMIN — IPRATROPIUM BROMIDE 0.5 MG: 0.5 SOLUTION RESPIRATORY (INHALATION) at 08:24

## 2024-11-13 RX ADMIN — GUAIFENESIN 600 MG: 600 TABLET, EXTENDED RELEASE ORAL at 09:54

## 2024-11-13 RX ADMIN — LEVALBUTEROL HYDROCHLORIDE 1.25 MG: 1.25 SOLUTION RESPIRATORY (INHALATION) at 15:23

## 2024-11-13 RX ADMIN — LEVALBUTEROL HYDROCHLORIDE 1.25 MG: 1.25 SOLUTION RESPIRATORY (INHALATION) at 08:24

## 2024-11-13 RX ADMIN — LEVALBUTEROL HYDROCHLORIDE 1.25 MG: 1.25 SOLUTION RESPIRATORY (INHALATION) at 19:59

## 2024-11-13 RX ADMIN — FLUOXETINE HYDROCHLORIDE 20 MG: 20 CAPSULE ORAL at 09:53

## 2024-11-13 RX ADMIN — LEVALBUTEROL HYDROCHLORIDE 1.25 MG: 1.25 SOLUTION RESPIRATORY (INHALATION) at 11:32

## 2024-11-13 RX ADMIN — METOPROLOL TARTRATE 25 MG: 25 TABLET, FILM COATED ORAL at 21:04

## 2024-11-13 RX ADMIN — NYSTATIN: 100000 POWDER TOPICAL at 10:02

## 2024-11-13 RX ADMIN — AZITHROMYCIN DIHYDRATE 500 MG: 250 TABLET ORAL at 21:24

## 2024-11-13 RX ADMIN — APIXABAN 5 MG: 5 TABLET, FILM COATED ORAL at 09:54

## 2024-11-13 RX ADMIN — THIAMINE HCL TAB 100 MG 100 MG: 100 TAB at 09:54

## 2024-11-13 RX ADMIN — METHYLPREDNISOLONE SODIUM SUCCINATE 40 MG: 40 INJECTION, POWDER, FOR SOLUTION INTRAMUSCULAR; INTRAVENOUS at 09:53

## 2024-11-13 RX ADMIN — IPRATROPIUM BROMIDE 0.5 MG: 0.5 SOLUTION RESPIRATORY (INHALATION) at 11:32

## 2024-11-13 RX ADMIN — APIXABAN 5 MG: 5 TABLET, FILM COATED ORAL at 17:09

## 2024-11-13 NOTE — PLAN OF CARE
Problem: PAIN - ADULT  Goal: Verbalizes/displays adequate comfort level or baseline comfort level  Description: Interventions:  - Encourage patient to monitor pain and request assistance  - Assess pain using appropriate pain scale  - Administer analgesics based on type and severity of pain and evaluate response  - Implement non-pharmacological measures as appropriate and evaluate response  - Consider cultural and social influences on pain and pain management  - Notify physician/advanced practitioner if interventions unsuccessful or patient reports new pain  Outcome: Progressing     Problem: INFECTION - ADULT  Goal: Absence or prevention of progression during hospitalization  Description: INTERVENTIONS:  - Assess and monitor for signs and symptoms of infection  - Monitor lab/diagnostic results  - Monitor all insertion sites, i.e. indwelling lines, tubes, and drains  - Monitor endotracheal if appropriate and nasal secretions for changes in amount and color  - [place] appropriate cooling/warming therapies per order  - Administer medications as ordered  - Instruct and encourage patient and family to use good hand hygiene technique  - Identify and instruct in appropriate isolation precautions for identified infection/condition  Outcome: Progressing  Goal: Absence of fever/infection during neutropenic period  Description: INTERVENTIONS:  - Monitor WBC    Outcome: Progressing     Problem: SAFETY ADULT  Goal: Patient will remain free of falls  Description: INTERVENTIONS:  - Educate patient/family on patient safety including physical limitations  - Instruct patient to call for assistance with activity   - Consult OT/PT to assist with strengthening/mobility   - Keep Call bell within reach  - Keep bed low and locked with side rails adjusted as appropriate  - Keep care items and personal belongings within reach  - Initiate and maintain comfort rounds  - Make Fall Risk Sign visible to staff  - Offer Toileting every 2 Hours,  in advance of need  - Initiate/Maintain bedalarm  - Obtain necessary fall risk management equipment: no slip socks   - Apply yellow socks and bracelet for high fall risk patients  - Consider moving patient to room near nurses station  Outcome: Progressing  Goal: Maintain or return to baseline ADL function  Description: INTERVENTIONS:  -  Assess patient's ability to carry out ADLs; assess patient's baseline for ADL function and identify physical deficits which impact ability to perform ADLs (bathing, care of mouth/teeth, toileting, grooming, dressing, etc.)  - Assess/evaluate cause of self-care deficits   - Assess range of motion  - Assess patient's mobility; develop plan if impaired  - Assess patient's need for assistive devices and provide as appropriate  - Encourage maximum independence but intervene and supervise when necessary  - Involve family in performance of ADLs  - Assess for home care needs following discharge   - Consider OT consult to assist with ADL evaluation and planning for discharge  - Provide patient education as appropriate  Outcome: Progressing  Goal: Maintains/Returns to pre admission functional level  Description: INTERVENTIONS:  - Perform AM-PAC 6 Click Basic Mobility/ Daily Activity assessment daily.  - Set and communicate daily mobility goal to care team and patient/family/caregiver.   - Collaborate with rehabilitation services on mobility goals if consulted  - Perform Range of Motion 3 times a day.  - Reposition patient every 2 hours.  - Dangle patient 3 times a day  - Stand patient 3 times a day  - Ambulate patient 3 times a day  - Out of bed to chair 3 times a day   - Out of bed for meals 3 times a day  - Out of bed for toileting  - Record patient progress and toleration of activity level   Outcome: Progressing     Problem: DISCHARGE PLANNING  Goal: Discharge to home or other facility with appropriate resources  Description: INTERVENTIONS:  - Identify barriers to discharge w/patient and  caregiver  - Arrange for needed discharge resources and transportation as appropriate  - Identify discharge learning needs (meds, wound care, etc.)  - Arrange for interpretive services to assist at discharge as needed  - Refer to Case Management Department for coordinating discharge planning if the patient needs post-hospital services based on physician/advanced practitioner order or complex needs related to functional status, cognitive ability, or social support system  Outcome: Progressing     Problem: Knowledge Deficit  Goal: Patient/family/caregiver demonstrates understanding of disease process, treatment plan, medications, and discharge instructions  Description: Complete learning assessment and assess knowledge base.  Interventions:  - Provide teaching at level of understanding  - Provide teaching via preferred learning methods  Outcome: Progressing     Problem: GASTROINTESTINAL - ADULT  Goal: Minimal or absence of nausea and/or vomiting  Description: INTERVENTIONS:  - Administer IV fluids if ordered to ensure adequate hydration  - Maintain NPO status until nausea and vomiting are resolved  - Nasogastric tube if ordered  - Administer ordered antiemetic medications as needed  - Provide nonpharmacologic comfort measures as appropriate  - Advance diet as tolerated, if ordered  - Consider nutrition services referral to assist patient with adequate nutrition and appropriate food choices  Outcome: Progressing  Goal: Maintains or returns to baseline bowel function  Description: INTERVENTIONS:  - Assess bowel function  - Encourage oral fluids to ensure adequate hydration  - Administer IV fluids if ordered to ensure adequate hydration  - Administer ordered medications as needed  - Encourage mobilization and activity  - Consider nutritional services referral to assist patient with adequate nutrition and appropriate food choices  Outcome: Progressing  Goal: Maintains adequate nutritional intake  Description:  INTERVENTIONS:  - Monitor percentage of each meal consumed  - Identify factors contributing to decreased intake, treat as appropriate  - Assist with meals as needed  - Monitor I&O, weight, and lab values if indicated  - Obtain nutrition services referral as needed  Outcome: Progressing  Goal: Establish and maintain optimal ostomy function  Description: INTERVENTIONS:  - Assess bowel function  - Encourage oral fluids to ensure adequate hydration  - Administer IV fluids if ordered to ensure adequate hydration   - Administer ordered medications as needed  - Encourage mobilization and activity  - Nutrition services referral to assist patient with appropriate food choices  - Assess stoma site  - Consider wound care consult   Outcome: Progressing  Goal: Oral mucous membranes remain intact  Description: INTERVENTIONS  - Assess oral mucosa and hygiene practices  - Implement preventative oral hygiene regimen  - Implement oral medicated treatments as ordered  - Initiate Nutrition services referral as needed  Outcome: Progressing     Problem: RESPIRATORY - ADULT  Goal: Achieves optimal ventilation and oxygenation  Description: INTERVENTIONS:  - Assess for changes in respiratory status  - Assess for changes in mentation and behavior  - Position to facilitate oxygenation and minimize respiratory effort  - Oxygen administered by appropriate delivery if ordered  - Initiate smoking cessation education as indicated  - Encourage broncho-pulmonary hygiene including cough, deep breathe, Incentive Spirometry  - Assess the need for suctioning and aspirate as needed  - Assess and instruct to report SOB or any respiratory difficulty  - Respiratory Therapy support as indicated  Outcome: Progressing     Problem: Prexisting or High Potential for Compromised Skin Integrity  Goal: Skin integrity is maintained or improved  Description: INTERVENTIONS:  - Identify patients at risk for skin breakdown  - Assess and monitor skin integrity  - Assess  and monitor nutrition and hydration status  - Monitor labs   - Assess for incontinence   - Turn and reposition patient  - Assist with mobility/ambulation  - Relieve pressure over bony prominences  - Avoid friction and shearing  - Provide appropriate hygiene as needed including keeping skin clean and dry  - Evaluate need for skin moisturizer/barrier cream  - Collaborate with interdisciplinary team   - Patient/family teaching  - Consider wound care consult   Outcome: Progressing

## 2024-11-13 NOTE — CASE MANAGEMENT
Case Management Assessment & Discharge Planning Note    Patient name Sophie Jamil  Location [address] [address]/[address] [address]-* MRN [id number]  : 1965 Date 2024       Current Admission Date: 2024  Current Admission Diagnosis:New onset a-fib (HCC)   Patient Active Problem List    Diagnosis Date Noted Date Diagnosed    New onset a-fib (HCC) 2024     Diarrhea 2024     Hyponatremia 2024     SIRS (systemic inflammatory response syndrome) (HCC) 2024     Hypomagnesemia 2024     increased anion gap (IAG) 2024     Electrolyte disturbance 2024     Elevated LFTs 2022     Recurrent major depressive disorder (HCC) 2021     Essential hypertension 09/15/2021     Epidermal inclusion cyst 2021     Mixed hyperlipidemia 2021     Vitamin D deficiency 2021     Insomnia 2021     ETOH abuse 2021     Anxiety 2021     Restless leg syndrome 2021     MDD (major depressive disorder) 2020     Chronic obstructive pulmonary disease with acute exacerbation (HCC)      Reactive airway disease 2016     Nicotine dependence 2016     Adrenal incidentaloma, Left 2016       LOS (days): 2  Geometric Mean LOS (GMLOS) (days):   Days to GMLOS:     OBJECTIVE:    Risk of Unplanned Readmission Score: 15.46         Current admission status: Inpatient  Referral Reason: Other (Discharge planning)    Preferred Pharmacy:   [facility] [place], [place]   [employer] [address] [address]  Phone: [phone] Fax: [phone]    Primary Care Provider: Jose Briceno MD    Primary Insurance: [facility]  Secondary Insurance:     ASSESSMENT:  Active Health Care Proxies    There are no active Health Care Proxies on file.          Readmission Root Cause  30 Day Readmission: No    Patient Information  Admitted from:: Home  Mental Status: Alert  During Assessment patient was accompanied by:  Other-Comment (significant other Vasquez)  Assessment information provided by:: Patient  Primary Caregiver: Self  Support Systems: Spouse/significant other, Family members, Friends/neighbors  County of Residence: [place]  What city do you live in?: [place]  Home entry access options. Select all that apply.: Stairs  Number of steps to enter home.: 1  Type of Current Residence: Apartment  Floor Level: 1  Upon entering residence, is there a bedroom on the main floor (no further steps)?: Yes  Upon entering residence, is there a bathroom on the main floor (no further steps)?: Yes  Living Arrangements: Lives Alone  Is patient a ?: No    Activities of Daily Living Prior to Admission  Functional Status: Independent  Completes ADLs independently?: Yes  Ambulates independently?: Yes  Does patient use assisted devices?: No  Does patient currently own DME?: No  Does the patient have a history of Short-Term Rehab?: No  Does patient have a history of HHC?: No  Does patient currently have HHC?: No    Current Home Health Care  Home Health Agency Name:: [facility]    Patient Information Continued  Income Source: SSI/SSD  Does patient have prescription coverage?: Yes  Does patient receive dialysis treatments?: No  Does patient have a history of substance abuse?: Yes  Historical substance use preference: Alcohol/ETOH  Does patient have a history of Mental Health Diagnosis?: Yes (Major depressive disorder)  Is patient receiving treatment for mental health?: Yes ([facility])  Has patient received inpatient treatment related to mental health in the last 2 years?: No    Means of Transportation  Means of Transport to Appts:: [facility]      DISCHARGE DETAILS:    Discharge planning discussed with:: Patient  Freedom of Choice: Yes    Comments - Freedom of Choice: SW spoke with patient at bedside to introduce role of CM, conduct assessment and discuss discharge planning.  Patient lives alone and her  preference is to return home when medically cleared.  She is independent at baseline and uses [employer] for transportation.  She will need transportation home when cleared.      Patient is assessed at Level III for rehabilitation needs and HHC is recommended.  Patient is agreeable to a referral for HHC and [facility] was [facility] to [facility] per patient's insurance.  SW will continue to follow.      CM contacted family/caregiver?: Yes (Significant other Vasquez at bedside)  Were Treatment Team discharge recommendations reviewed with patient/caregiver?: Yes  Did patient/caregiver verbalize understanding of patient care needs?: Yes  Were patient/caregiver advised of the risks associated with not following Treatment Team discharge recommendations?: Yes    Contacts  Patient Contacts: Vasquez (significant other)  Relationship to Patient:: Friend  Contact Method: In Person  Reason/Outcome: Discharge Planning    Requested Home Health Care         Is the patient interested in HHC at discharge?: Yes  Home Health Discipline requested:: Nursing, Occupational Therapy, Physical Therapy  [facility]  HHA External Referral Reason (only applicable if external HHA name selected): Services not provided in network or near patient location  Home Health Follow-Up Provider:: PCP  Home Health Services Needed:: COPD Management, Evaluate Functional Status and Safety, Gait/ADL Training, Strengthening/Theraputic Exercises to Improve Function  Homebound Criteria Met:: Requires the Assistance of Another Person for Safe Ambulation or to Leave the Home  Supporting Clincal Findings:: Dyspnea with Exertion, Fatigues Easliy in Short Distances, Limited Endurance    DME Referral Provided  Referral made for DME?: No    Other Referral/Resources/Interventions Provided:  Interventions: HHC  Programs:: COPD    Would you like to participate in our [facility] service program?  : No - Declined    Treatment Team Recommendation: Home  with Home Health Care  Discharge Destination Plan:: Home with Home Health Care  Transport at Discharge : Ride Share, Wheelchair van (pending progress)

## 2024-11-13 NOTE — ASSESSMENT & PLAN NOTE
POA with shortness of breath and expiratory wheezing starting the morning of admission. Per patient ran out of home albuterol inhaler  D dimer elevated: 1.07  CTA chest: Negative for pulmonary embolism. Mild centrilobular emphysema. Left lower lobe calcified granuloma. Minimal scarring at the base of the lingula there is no tracheal or endobronchial lesion  Continue Xoponex and Atrovent nebulizers 4 times daily  IV Solumedrol 40mg TID  Azithromycin x 3 days  Currently on 2L NC, titrate oxygen as able to maintain O2 89% or greater  Respiratory protocol  Referral for Pulmonology at discharge

## 2024-11-13 NOTE — PROGRESS NOTES
Progress Note - Hospitalist   Name: Sophie Jamil 59 y.o. female I MRN: [id number]  Unit/Bed#: [address] [place] [address] I Date of Admission: 11/11/2024   Date of Service: 11/13/2024 I Hospital Day: 2    Assessment & Plan  New onset a-fib (HCC)  POA with shortness of breath, dizziness, nausea/vomiting and diarrhea that started this morning.  She went to urgent care and she was found that her heart rate 130-150 in the office and ECG revealed afib with RVR and send to the ED for further evaluation  In ED received 15 mg of IV Cardizem x 1 & repeat EKG showed that patient had converted to sinus rhythm  Suspect new onset A-fib secondary to electrolyte imbalance, diarrhea and acute COPD exacerbation  Troponins negative x 2  [id number] 2. Transitioned to Eliquis 5mg BID  Echo: The left ventricular ejection fraction is 60%. Systolic function is normal. Wall motion is normal. Diastolic function is normal.   Started on metoprolol tartrate 25mg BID for rate control  Monitor on telemetry  Optimize electrolytes  Consult to Cardiology, recommendations are appreciated  Chronic obstructive pulmonary disease with acute exacerbation (HCC)  POA with shortness of breath and expiratory wheezing starting the morning of admission. Per patient ran out of home albuterol inhaler  D dimer elevated: 1.07  CTA chest: Negative for pulmonary embolism. Mild centrilobular emphysema. Left lower lobe calcified granuloma. Minimal scarring at the base of the lingula there is no tracheal or endobronchial lesion  Continue Xoponex and Atrovent nebulizers 4 times daily  IV Solumedrol 40mg TID  Azithromycin x 3 days  Currently on 2L NC, titrate oxygen as able to maintain O2 89% or greater  Respiratory protocol  Referral for Pulmonology at discharge  Diarrhea  POA with over 10 episodes of diarrhea and 5 episodes of vomiting that started the morning of admission  Denies any recent antibiotic usage or travel. Denies abdominal pain,denies fever, or chills.  In ED, received  1 dose of Imodium  Noted afebrile, leukocytosis WBC 14.64 , procal negative  CT A/P wcontrast: Negative for acute pathology. Hepatic steatosis. Stable 3.4 cm left adrenal gland nodule.no diverticulitis noted  Cdiff PCR negative. Bacterial enteric panel could not be collected  Hold off further doses of imodium  Monitor off antibiotics  Patient reports no further diarrhea since admission  Nicotine dependence  Smokes 0.5 PPD for greater than 50 years  Continue nicotine patch  Smoking cessation provided     ETOH abuse  Per patient drinks 40 oz beer daily. Per patient last drink was prior to ED arrival  Denies w/d seizures  Not in acute withdrawal  Ethanol level negative  Continue CIWA protocol   Initiate multivitamin,  thiamine and folic acid  Complete alcohol cessation discussed with patient  MDD (major depressive disorder)  Mood stable  Continue home Prozac and Zyprexa      VTE Pharmacologic Prophylaxis:   Moderate Risk (Score 3-4) - Pharmacological DVT Prophylaxis Ordered: apixaban (Eliquis).    Mobility:   Basic Mobility Inpatient Raw Score: 13  JH-HLM Goal: 4: Move to chair/commode  JH-HLM Achieved: 6: Walk 10 steps or more  JH-HLM Goal NOT achieved. Continue with multidisciplinary rounding and encourage appropriate mobility to improve upon JH-HLM goals.    Patient Centered Rounds: I performed bedside rounds with nursing staff today.   Discussions with Specialists or Other Care Team Provider: Nursing, Cardiology    Education and Discussions with Family / Patient: Updated  (significant other) at bedside.    Current Length of Stay: 2 day(s)  Current Patient Status: Inpatient   Certification Statement: The patient will continue to require additional inpatient hospital stay due to afib, COPD exacerbation  Discharge Plan: Anticipate discharge in 48-72 hrs to home.    Code Status: Level 1 - Full Code    Subjective   Patient reports feeling better today. Wheezing is improved. Denies any chest pain. No  further diarrhea since admission    Objective :  Temp:  [98 °F (36.7 °C)-99.5 °F (37.5 °C)] 99 °F (37.2 °C)  HR:  [] 68  BP: (106-137)/(58-94) 108/74  Resp:  [18-21] 21  SpO2:  [88 %-97 %] 96 %  O2 Device: Nasal cannula  Nasal Cannula O2 Flow Rate (L/min):  [2 L/min] 2 L/min    Body mass index is 31.25 kg/m².     Input and Output Summary (last 24 hours):     Intake/Output Summary (Last 24 hours) at 11/13/2024 1356  Last data filed at 11/13/2024 0601  Gross per 24 hour   Intake 120 ml   Output 1250 ml   Net -1130 ml       Physical Exam  Vitals and nursing note reviewed.   Constitutional:       General: She is not in acute distress.     Appearance: She is well-developed. She is ill-appearing.   HENT:      Head: Normocephalic and atraumatic.   Eyes:      Conjunctiva/sclera: Conjunctivae normal.   Cardiovascular:      Rate and Rhythm: Tachycardia present. Rhythm irregular.      Heart sounds: No murmur heard.  Pulmonary:      Effort: Pulmonary effort is normal. No respiratory distress.      Breath sounds: Wheezing present.   Abdominal:      Palpations: Abdomen is soft.      Tenderness: There is no abdominal tenderness.   Musculoskeletal:         General: No swelling.      Cervical back: Neck supple.      Right lower leg: No edema.      Left lower leg: No edema.   Skin:     General: Skin is warm and dry.      Capillary Refill: Capillary refill takes less than 2 seconds.   Neurological:      Mental Status: She is alert. Mental status is at baseline.   Psychiatric:         Mood and Affect: Mood normal.           Lines/Drains:        Telemetry:  Telemetry Orders (From admission, onward)               24 Hour Telemetry Monitoring  Continuous x 24 Hours (Telem)        Question:  Reason for 24 Hour Telemetry  Answer:  Decompensated CHF- and any one of the following: continuous diuretic infusion or total diuretic dose >200 mg daily, associated electrolyte derangement (I.e. K < 3.0), ionotropic drip (continuous infusion),  hx of ventricular arrhythmia, or new EF < 35%                     Telemetry Reviewed: Atrial fibrillation. HR averaging 130s  Indication for Continued Telemetry Use: Arrthymias requiring medical therapy               Lab Results: I have reviewed the following results:   Results from last 7 days   Lab Units 11/13/24 0455 11/12/24  0450   WBC Thousand/uL 15.66* 11.85*   HEMOGLOBIN g/dL 11.2* 13.0   HEMATOCRIT % 34.0* 38.0   PLATELETS Thousands/uL 250 266   SEGS PCT %  --  95*   LYMPHO PCT %  --  4*   MONO PCT %  --  1*   EOS PCT %  --  0     Results from last 7 days   Lab Units 11/13/24  0455 11/12/24 0450 11/11/24  1705   SODIUM mmol/L 139   < > 128*   POTASSIUM mmol/L 3.9   < > 3.8   CHLORIDE mmol/L 107   < > 96   CO2 mmol/L 24   < > 14*   BUN mg/dL 5   < > 7   CREATININE mg/dL 0.59*   < > 0.79   ANION GAP mmol/L 8   < > 18*   CALCIUM mg/dL 9.0   < > 8.7   ALBUMIN g/dL  --   --  4.2   TOTAL BILIRUBIN mg/dL  --   --  0.75   ALK PHOS U/L  --   --  102   ALT U/L  --   --  81*   AST U/L  --   --  153*   GLUCOSE RANDOM mg/dL 158*   < > 145*    < > = values in this interval not displayed.                 Results from last 7 days   Lab Units 11/11/24  2245   PROCALCITONIN ng/ml 0.08       Recent Cultures (last 7 days):   Results from last 7 days   Lab Units 11/11/24  2238   C DIFF TOXIN B BY PCR  Negative       Imaging Results Review: I reviewed radiology reports from this admission including: CT chest, CT abdomen/pelvis, and Echocardiogram.  Other Study Results Review: EKG was reviewed.     Last 24 Hours Medication List:     Current Facility-Administered Medications:     acetaminophen (TYLENOL) tablet 650 mg, Q6H PRN    apixaban (ELIQUIS) tablet 5 mg, BID    atorvastatin (LIPITOR) tablet 40 mg, Daily With Dinner    azithromycin (ZITHROMAX) tablet 500 mg, Q24H    diltiazem (CARDIZEM) injection 15 mg, Once    FLUoxetine (PROzac) capsule 20 mg, Daily    folic acid (FOLVITE) tablet 1 mg, Daily    guaiFENesin (MUCINEX) 12 hr  tablet 600 mg, BID    ipratropium (ATROVENT) 0.02 % inhalation solution 0.5 mg, 4x Daily    ipratropium (ATROVENT) 0.02 % inhalation solution 0.5 mg, Q6H PRN    levalbuterol (XOPENEX) inhalation solution 1.25 mg, Q6H PRN **AND** sodium chloride 0.9 % inhalation solution 3 mL, Q6H PRN    levalbuterol (XOPENEX) inhalation solution 1.25 mg, 4x Daily    lisinopril (ZESTRIL) tablet 20 mg, Daily    methylPREDNISolone sodium succinate (Solu-MEDROL) injection 40 mg, Q8H    metoprolol tartrate (LOPRESSOR) tablet 25 mg, Q12H HENRY    multivitamin-minerals (CENTRUM) tablet 1 tablet, Daily    nicotine (NICODERM CQ) 7 mg/24hr TD 24 hr patch 1 patch, Daily    nystatin (MYCOSTATIN) powder, BID    OLANZapine (ZyPREXA) tablet 10 mg, QAM    ondansetron (ZOFRAN) injection 4 mg, Q6H PRN    rOPINIRole (REQUIP) tablet 0.5 mg, HS    thiamine tablet 100 mg, Daily    Administrative Statements   Today, Patient Was Seen By: Kerri Pena PA-C  I have spent a total time of 30 minutes in caring for this patient on the day of the visit/encounter including Diagnostic results, Risks and benefits of tx options, Instructions for management, Patient and family education, Counseling / Coordination of care, Documenting in the medical record, Reviewing / ordering tests, medicine, procedures  , Obtaining or reviewing history  , and Communicating with other healthcare professionals .    **Please Note: This note may have been constructed using a voice recognition system.**

## 2024-11-13 NOTE — ASSESSMENT & PLAN NOTE
POA with over 10 episodes of diarrhea and 5 episodes of vomiting that started the morning of admission  Denies any recent antibiotic usage or travel. Denies abdominal pain,denies fever, or chills.  In ED, received 1 dose of Imodium  Noted afebrile, leukocytosis WBC 14.64 , procal negative  CT A/P wcontrast: Negative for acute pathology. Hepatic steatosis. Stable 3.4 cm left adrenal gland nodule.no diverticulitis noted  Cdiff PCR negative. Bacterial enteric panel could not be collected  Hold off further doses of imodium  Monitor off antibiotics  Patient reports no further diarrhea since admission

## 2024-11-13 NOTE — ASSESSMENT & PLAN NOTE
POA with shortness of breath, dizziness, nausea/vomiting and diarrhea that started this morning.  She went to urgent care and she was found that her heart rate 130-150 in the office and ECG revealed afib with RVR and send to the ED for further evaluation  In ED received 15 mg of IV Cardizem x 1 & repeat EKG showed that patient had converted to sinus rhythm  Suspect new onset A-fib secondary to electrolyte imbalance, diarrhea and acute COPD exacerbation  Troponins negative x 2  [id number] 2. Transitioned to Eliquis 5mg BID  Echo: The left ventricular ejection fraction is 60%. Systolic function is normal. Wall motion is normal. Diastolic function is normal.   Started on metoprolol tartrate 25mg BID for rate control  Monitor on telemetry  Optimize electrolytes  Consult to Cardiology, recommendations are appreciated

## 2024-11-13 NOTE — PROGRESS NOTES
Progress Note - Cardiology   Name: Sophie Jamil 59 y.o. female I MRN: [id number]  Unit/Bed#: [address] [address] I Date of Admission: 11/11/2024   Date of Service: 11/13/2024 I Hospital Day: 2    Assessment & Plan  New onset a-fib (HCC)  in the setting of electrolyte imbalance, nausea, vomiting and diarrhea  patient converted back to sinus rhythm after receiving electrolyte replacement and Cardizem 15 mg IV times one  11/13/2024, telemetry reviewed and appears patient converted back to rapid atrial fibrillation rates 100 to 130s at approximately 430 am   will start Lopressor 25 mg BID and continue to monitor telemetry  [id number] score = 2, or 2.2% risk of stroke per year, discontinue Lovenox and start Eliquis 5 mg bid  consider HUMERA guided cardioversion if remains in atrial fibrillation  overnight pulse oximetry to screen for sleep apnea  discuss with patient would recommend short course of anticoagulation due to her risk of stroke with monitoring for recurrence of atrial fibrillation  encourage smoking and alcohol cessation  Chronic obstructive pulmonary disease with acute exacerbation (HCC)  long history of tobacco abuse  noted wheezing intermittently today  11/11/2024 CT chest PE protocol, negative for PE but presence of mild centrilobular emphysema noted  patient ordered for azithromycin, Xopenex and IV steroids per primary team  ETOH abuse  per patient she drinks approximately 40 ounces of beer daily and occasional wine  CIWA protocol per primary team  Diarrhea  per patient's sudden onset of diarrhea with over 10 episodes prior to be in seen at urgent care  no recent travel or antibiotic usage  no further diarrhea or vomiting since admission to hospital  11/11/2024 CT abdomen and pelvis: no acute inflammatory process, concern for hepatic steatosis  workup her primary team  Electrolyte disturbance  magnesium was 1.5 with a potassium of 3.8 on admission,  replacement ongoing and electrolyte stable  Nicotine  dependence  smokes approximately one half pack cigarettes per day for over 50 years  encourage smoking cessation  MDD (major depressive disorder)  patient does note she does not really go outside due to her depression  currently on Prozac and Zyprexa    Subjective   Chief Complaint: telemetry reviewed and overnight at approximately 430 this morning patient converted back to atrial fibrillation rates have been varying between 90 to 150. Will start low-dose beta blocker and Cardizem drip. Plan as above.      Objective :  Temp:  [98 °F (36.7 °C)-99.5 °F (37.5 °C)] 99 °F (37.2 °C)  HR:  [] 118  BP: (106-137)/(58-94) 137/90  Resp:  [18-21] 21  SpO2:  [88 %-96 %] 94 %  O2 Device: Nasal cannula  Nasal Cannula O2 Flow Rate (L/min):  [2 L/min] 2 L/min  Orthostatic Blood Pressures      Flowsheet Row Most Recent Value   Blood Pressure 137/90 filed at 11/13/2024 0818   Patient Position - Orthostatic VS Lying filed at 11/13/2024 0818          First Weight: Weight - Scale: 72.7 kg (160 lb 4.4 oz) (11/11/24 2322)  Vitals:    11/11/24 2322 11/12/24 0920   Weight: 72.7 kg (160 lb 4.4 oz) 72.6 kg (160 lb)     Physical Exam  Vitals and nursing note reviewed.   Constitutional:       Appearance: Normal appearance. She is obese. She is ill-appearing (Chronically).   HENT:      Right Ear: External ear normal.      Left Ear: External ear normal.   Eyes:      General:         Right eye: No discharge.         Left eye: No discharge.   Cardiovascular:      Rate and Rhythm: Tachycardia present. Rhythm irregular.      Pulses: Normal pulses.   Pulmonary:      Effort: Pulmonary effort is normal.      Breath sounds: Examination of the right-lower field reveals decreased breath sounds. Examination of the left-lower field reveals decreased breath sounds. Decreased breath sounds and wheezing present.      Comments: Course breath sounds with occasional productive cough  Abdominal:      General: Bowel sounds are normal. There is no distension.       Palpations: Abdomen is soft.   Musculoskeletal:      Right lower leg: No edema.      Left lower leg: No edema.   Skin:     General: Skin is warm and dry.      Capillary Refill: Capillary refill takes less than 2 seconds.   Neurological:      General: No focal deficit present.      Mental Status: She is alert and oriented to person, place, and time. Mental status is at baseline.   Psychiatric:         Mood and Affect: Mood normal.           Lab Results: I have reviewed the following results:  Results from last 7 days   Lab Units 11/13/24 0455 11/12/24 0450 11/11/24  1705   WBC Thousand/uL 15.66* 11.85* 14.64*   HEMOGLOBIN g/dL 11.2* 13.0 13.8   HEMATOCRIT % 34.0* 38.0 40.5   PLATELETS Thousands/uL 250 266 287     Results from last 7 days   Lab Units 11/13/24 0455 11/12/24 0450 11/11/24  1705   POTASSIUM mmol/L 3.9 3.6 3.8   CHLORIDE mmol/L 107 105 96   CO2 mmol/L 24 18* 14*   BUN mg/dL 5 4* 7   CREATININE mg/dL 0.59* 0.64 0.79   CALCIUM mg/dL 9.0 8.7 8.7         Lab Results   Component Value Date    HGBA1C 5.9 (H) 03/19/2024     Lab Results   Component Value Date    TROPONINI <0.02 02/14/2020       EKG: telemetry reviewed and at approximately 4:30 AM patient converted back to rapid atrial fibrillation      VTE Pharmacologic Prophylaxis: VTE covered by:  apixaban, Oral, 5 mg at 11/13/24 0954     VTE Mechanical Prophylaxis: sequential compression device    Melissa DOUGHERTY  Cardiology   no

## 2024-11-13 NOTE — ASSESSMENT & PLAN NOTE
Per patient drinks 40 oz beer daily. Per patient last drink was prior to ED arrival  Denies w/d seizures  Not in acute withdrawal  Ethanol level negative  Continue CIWA protocol   Initiate multivitamin,  thiamine and folic acid  Complete alcohol cessation discussed with patient

## 2024-11-13 NOTE — PLAN OF CARE
Problem: PAIN - ADULT  Goal: Verbalizes/displays adequate comfort level or baseline comfort level  Description: Interventions:  - Encourage patient to monitor pain and request assistance  - Assess pain using appropriate pain scale  - Administer analgesics based on type and severity of pain and evaluate response  - Implement non-pharmacological measures as appropriate and evaluate response  - Consider cultural and social influences on pain and pain management  - Notify physician/advanced practitioner if interventions unsuccessful or patient reports new pain  11/13/2024 1053 by Nicky Coehlo RN  Outcome: Progressing  11/13/2024 1052 by Nicky Coelho RN  Outcome: Progressing     Problem: INFECTION - ADULT  Goal: Absence or prevention of progression during hospitalization  Description: INTERVENTIONS:  - Assess and monitor for signs and symptoms of infection  - Monitor lab/diagnostic results  - Monitor all insertion sites, i.e. indwelling lines, tubes, and drains  - Monitor endotracheal if appropriate and nasal secretions for changes in amount and color  - [place] appropriate cooling/warming therapies per order  - Administer medications as ordered  - Instruct and encourage patient and family to use good hand hygiene technique  - Identify and instruct in appropriate isolation precautions for identified infection/condition  11/13/2024 1053 by Nicky Coelho RN  Outcome: Progressing  11/13/2024 1052 by Nicky Coelho RN  Outcome: Progressing  Goal: Absence of fever/infection during neutropenic period  Description: INTERVENTIONS:  - Monitor WBC    11/13/2024 1053 by Nicky Coelho RN  Outcome: Progressing  11/13/2024 1052 by Nicky Coelho RN  Outcome: Progressing     Problem: SAFETY ADULT  Goal: Patient will remain free of falls  Description: INTERVENTIONS:  - Educate patient/family on patient safety including physical limitations  - Instruct patient to call for assistance with activity   - Consult OT/PT to assist with  strengthening/mobility   - Keep Call bell within reach  - Keep bed low and locked with side rails adjusted as appropriate  - Keep care items and personal belongings within reach  - Initiate and maintain comfort rounds  - Make Fall Risk Sign visible to staff  - Offer Toileting every 2 Hours, in advance of need  - Initiate/Maintain bed alarm  - Obtain necessary fall risk management equipment: call bell   - Apply yellow socks and bracelet for high fall risk patients  - Consider moving patient to room near nurses station  11/13/2024 1053 by Nicky Coelho RN  Outcome: Progressing  11/13/2024 1052 by Nicky Coelho RN  Outcome: Progressing  Goal: Maintain or return to baseline ADL function  Description: INTERVENTIONS:  -  Assess patient's ability to carry out ADLs; assess patient's baseline for ADL function and identify physical deficits which impact ability to perform ADLs (bathing, care of mouth/teeth, toileting, grooming, dressing, etc.)  - Assess/evaluate cause of self-care deficits   - Assess range of motion  - Assess patient's mobility; develop plan if impaired  - Assess patient's need for assistive devices and provide as appropriate  - Encourage maximum independence but intervene and supervise when necessary  - Involve family in performance of ADLs  - Assess for home care needs following discharge   - Consider OT consult to assist with ADL evaluation and planning for discharge  - Provide patient education as appropriate  11/13/2024 1053 by Nicky Coelho RN  Outcome: Progressing  11/13/2024 1052 by Nicky Coelho RN  Outcome: Progressing  Goal: Maintains/Returns to pre admission functional level  Description: INTERVENTIONS:  - Perform AM-PAC 6 Click Basic Mobility/ Daily Activity assessment daily.  - Set and communicate daily mobility goal to care team and patient/family/caregiver.   - Collaborate with rehabilitation services on mobility goals if consulted  - Perform Range of Motion 3 times a day.  - Reposition patient  every 3 hours.  - Dangle patient 3 times a day  - Stand patient 3 times a day  - Ambulate patient 3 times a day  - Out of bed to chair 3 times a day   - Out of bed for meals 3 times a day  - Out of bed for toileting  - Record patient progress and toleration of activity level   11/13/2024 1053 by Nicky Coelho RN  Outcome: Progressing  11/13/2024 1052 by Nicky Coelho RN  Outcome: Progressing     Problem: DISCHARGE PLANNING  Goal: Discharge to home or other facility with appropriate resources  Description: INTERVENTIONS:  - Identify barriers to discharge w/patient and caregiver  - Arrange for needed discharge resources and transportation as appropriate  - Identify discharge learning needs (meds, wound care, etc.)  - Arrange for interpretive services to assist at discharge as needed  - Refer to Case Management Department for coordinating discharge planning if the patient needs post-hospital services based on physician/advanced practitioner order or complex needs related to functional status, cognitive ability, or social support system  11/13/2024 1053 by Nicky Coelho RN  Outcome: Progressing  11/13/2024 1052 by Nicky Coelho RN  Outcome: Progressing     Problem: Knowledge Deficit  Goal: Patient/family/caregiver demonstrates understanding of disease process, treatment plan, medications, and discharge instructions  Description: Complete learning assessment and assess knowledge base.  Interventions:  - Provide teaching at level of understanding  - Provide teaching via preferred learning methods  11/13/2024 1053 by Nicky Coelho RN  Outcome: Progressing  11/13/2024 1052 by Nicky Coelho RN  Outcome: Progressing     Problem: GASTROINTESTINAL - ADULT  Goal: Minimal or absence of nausea and/or vomiting  Description: INTERVENTIONS:  - Administer IV fluids if ordered to ensure adequate hydration  - Maintain NPO status until nausea and vomiting are resolved  - Nasogastric tube if ordered  - Administer ordered antiemetic medications  as needed  - Provide nonpharmacologic comfort measures as appropriate  - Advance diet as tolerated, if ordered  - Consider nutrition services referral to assist patient with adequate nutrition and appropriate food choices  11/13/2024 1053 by Nicky Coelho RN  Outcome: Progressing  11/13/2024 1052 by Nicky Coelho RN  Outcome: Progressing  Goal: Maintains or returns to baseline bowel function  Description: INTERVENTIONS:  - Assess bowel function  - Encourage oral fluids to ensure adequate hydration  - Administer IV fluids if ordered to ensure adequate hydration  - Administer ordered medications as needed  - Encourage mobilization and activity  - Consider nutritional services referral to assist patient with adequate nutrition and appropriate food choices  11/13/2024 1053 by Nicky Coelho RN  Outcome: Progressing  11/13/2024 1052 by Nicky Coelho RN  Outcome: Progressing  Goal: Maintains adequate nutritional intake  Description: INTERVENTIONS:  - Monitor percentage of each meal consumed  - Identify factors contributing to decreased intake, treat as appropriate  - Assist with meals as needed  - Monitor I&O, weight, and lab values if indicated  - Obtain nutrition services referral as needed  11/13/2024 1053 by Nicky Coelho RN  Outcome: Progressing  11/13/2024 1052 by Nicky Coelho RN  Outcome: Progressing  Goal: Establish and maintain optimal ostomy function  Description: INTERVENTIONS:  - Assess bowel function  - Encourage oral fluids to ensure adequate hydration  - Administer IV fluids if ordered to ensure adequate hydration   - Administer ordered medications as needed  - Encourage mobilization and activity  - Nutrition services referral to assist patient with appropriate food choices  - Assess stoma site  - Consider wound care consult   11/13/2024 1053 by Nicky Coelho RN  Outcome: Progressing  11/13/2024 1052 by Nicky Coelho RN  Outcome: Progressing  Goal: Oral mucous membranes remain intact  Description: INTERVENTIONS  -  Assess oral mucosa and hygiene practices  - Implement preventative oral hygiene regimen  - Implement oral medicated treatments as ordered  - Initiate Nutrition services referral as needed  11/13/2024 1053 by Nicky Coelho RN  Outcome: Progressing  11/13/2024 1052 by Nicky Coelho RN  Outcome: Progressing     Problem: RESPIRATORY - ADULT  Goal: Achieves optimal ventilation and oxygenation  Description: INTERVENTIONS:  - Assess for changes in respiratory status  - Assess for changes in mentation and behavior  - Position to facilitate oxygenation and minimize respiratory effort  - Oxygen administered by appropriate delivery if ordered  - Initiate smoking cessation education as indicated  - Encourage broncho-pulmonary hygiene including cough, deep breathe, Incentive Spirometry  - Assess the need for suctioning and aspirate as needed  - Assess and instruct to report SOB or any respiratory difficulty  - Respiratory Therapy support as indicated  11/13/2024 1053 by Nicky Coelho RN  Outcome: Progressing  11/13/2024 1052 by Nicky Coelho RN  Outcome: Progressing     Problem: Prexisting or High Potential for Compromised Skin Integrity  Goal: Skin integrity is maintained or improved  Description: INTERVENTIONS:  - Identify patients at risk for skin breakdown  - Assess and monitor skin integrity  - Assess and monitor nutrition and hydration status  - Monitor labs   - Assess for incontinence   - Turn and reposition patient  - Assist with mobility/ambulation  - Relieve pressure over bony prominences  - Avoid friction and shearing  - Provide appropriate hygiene as needed including keeping skin clean and dry  - Evaluate need for skin moisturizer/barrier cream  - Collaborate with interdisciplinary team   - Patient/family teaching  - Consider wound care consult   11/13/2024 1053 by Nicky Coelho RN  Outcome: Progressing  11/13/2024 1052 by Nicky Coelho RN  Outcome: Progressing

## 2024-11-13 NOTE — ASSESSMENT & PLAN NOTE
magnesium was 1.5 with a potassium of 3.8 on admission,  replacement ongoing and electrolyte stable

## 2024-11-13 NOTE — ASSESSMENT & PLAN NOTE
in the setting of electrolyte imbalance, nausea, vomiting and diarrhea  patient converted back to sinus rhythm after receiving electrolyte replacement and Cardizem 15 mg IV times one  11/13/2024, telemetry reviewed and appears patient converted back to rapid atrial fibrillation rates 100 to 130s at approximately 430 am   will start Lopressor 25 mg BID and continue to monitor telemetry  [id number] score = 2, or 2.2% risk of stroke per year, discontinue Lovenox and start Eliquis 5 mg bid  consider HUMERA guided cardioversion if remains in atrial fibrillation  overnight pulse oximetry to screen for sleep apnea  discuss with patient would recommend short course of anticoagulation due to her risk of stroke with monitoring for recurrence of atrial fibrillation  encourage smoking and alcohol cessation

## 2024-11-13 NOTE — NURSING NOTE
Provider made aware that patient EKG confirmed patient back in Afib with RVR with recommendation to continue monitoring.

## 2024-11-13 NOTE — PHYSICAL THERAPY NOTE
PT Cancellation Note         11/13/24 1515   Note Type   Note type Cancelled Session   Cancel Reasons Refusal  (Attempted to see pt this afternoon. Pt refusing, reports her legs are fatigued. Requesting PT to return tomorrow. Will follow-up as tolerated/appropriate.)   Licensure   [place] License Number  Sakina Steel [id number]

## 2024-11-14 PROBLEM — J96.01 ACUTE RESPIRATORY FAILURE WITH HYPOXIA (HCC): Status: ACTIVE | Noted: 2024-11-14

## 2024-11-14 LAB
ANION GAP SERPL CALCULATED.3IONS-SCNC: 6 MMOL/L (ref 4–13)
BUN SERPL-MCNC: 10 MG/DL (ref 5–25)
CALCIUM SERPL-MCNC: 8.8 MG/DL (ref 8.4–10.2)
CHLORIDE SERPL-SCNC: 109 MMOL/L (ref 96–108)
CO2 SERPL-SCNC: 26 MMOL/L (ref 21–32)
CREAT SERPL-MCNC: 0.56 MG/DL (ref 0.6–1.3)
ERYTHROCYTE [DISTWIDTH] IN BLOOD BY AUTOMATED COUNT: 13.7 % (ref 11.6–15.1)
FLUAV RNA RESP QL NAA+PROBE: NEGATIVE
FLUBV RNA RESP QL NAA+PROBE: NEGATIVE
GFR SERPL CREATININE-BSD FRML MDRD: 100 ML/MIN/1.73SQ M
GLUCOSE SERPL-MCNC: 150 MG/DL (ref 65–140)
HCT VFR BLD AUTO: 34 % (ref 34.8–46.1)
HGB BLD-MCNC: 11.1 G/DL (ref 11.5–15.4)
MAGNESIUM SERPL-MCNC: 2.2 MG/DL (ref 1.9–2.7)
MCH RBC QN AUTO: 32.6 PG (ref 26.8–34.3)
MCHC RBC AUTO-ENTMCNC: 32.6 G/DL (ref 31.4–37.4)
MCV RBC AUTO: 100 FL (ref 82–98)
PLATELET # BLD AUTO: 245 THOUSANDS/UL (ref 149–390)
PMV BLD AUTO: 10.8 FL (ref 8.9–12.7)
POTASSIUM SERPL-SCNC: 4.1 MMOL/L (ref 3.5–5.3)
QRS AXIS: 184 DEGREES
QRSD INTERVAL: 70 MS
QT INTERVAL: 384 MS
QTC INTERVAL: 503 MS
RBC # BLD AUTO: 3.4 MILLION/UL (ref 3.81–5.12)
RSV RNA RESP QL NAA+PROBE: NEGATIVE
SARS-COV-2 RNA RESP QL NAA+PROBE: NEGATIVE
SODIUM SERPL-SCNC: 141 MMOL/L (ref 135–147)
T WAVE AXIS: 11 DEGREES
VENTRICULAR RATE: 103 BPM
WBC # BLD AUTO: 14.63 THOUSAND/UL (ref 4.31–10.16)

## 2024-11-14 PROCEDURE — 94762 N-INVAS EAR/PLS OXIMTRY CONT: CPT

## 2024-11-14 PROCEDURE — 0241U HB NFCT DS VIR RESP RNA 4 TRGT: CPT | Performed by: INTERNAL MEDICINE

## 2024-11-14 PROCEDURE — 93005 ELECTROCARDIOGRAM TRACING: CPT

## 2024-11-14 PROCEDURE — 94668 MNPJ CHEST WALL SBSQ: CPT

## 2024-11-14 PROCEDURE — 99232 SBSQ HOSP IP/OBS MODERATE 35: CPT | Performed by: INTERNAL MEDICINE

## 2024-11-14 PROCEDURE — 94640 AIRWAY INHALATION TREATMENT: CPT

## 2024-11-14 PROCEDURE — 83735 ASSAY OF MAGNESIUM: CPT | Performed by: INTERNAL MEDICINE

## 2024-11-14 PROCEDURE — 93010 ELECTROCARDIOGRAM REPORT: CPT | Performed by: INTERNAL MEDICINE

## 2024-11-14 PROCEDURE — 99255 IP/OBS CONSLTJ NEW/EST HI 80: CPT | Performed by: STUDENT IN AN ORGANIZED HEALTH CARE EDUCATION/TRAINING PROGRAM

## 2024-11-14 PROCEDURE — 94760 N-INVAS EAR/PLS OXIMETRY 1: CPT

## 2024-11-14 PROCEDURE — 80048 BASIC METABOLIC PNL TOTAL CA: CPT | Performed by: INTERNAL MEDICINE

## 2024-11-14 PROCEDURE — 97530 THERAPEUTIC ACTIVITIES: CPT

## 2024-11-14 PROCEDURE — 97163 PT EVAL HIGH COMPLEX 45 MIN: CPT

## 2024-11-14 PROCEDURE — 94664 DEMO&/EVAL PT USE INHALER: CPT

## 2024-11-14 PROCEDURE — 85027 COMPLETE CBC AUTOMATED: CPT | Performed by: INTERNAL MEDICINE

## 2024-11-14 RX ORDER — METOPROLOL TARTRATE 50 MG
50 TABLET ORAL EVERY 12 HOURS SCHEDULED
Status: DISCONTINUED | OUTPATIENT
Start: 2024-11-14 | End: 2024-11-16 | Stop reason: HOSPADM

## 2024-11-14 RX ORDER — METHYLPREDNISOLONE SODIUM SUCCINATE 40 MG/ML
40 INJECTION, POWDER, LYOPHILIZED, FOR SOLUTION INTRAMUSCULAR; INTRAVENOUS EVERY 8 HOURS SCHEDULED
Status: DISCONTINUED | OUTPATIENT
Start: 2024-11-14 | End: 2024-11-15

## 2024-11-14 RX ORDER — METHYLPREDNISOLONE SODIUM SUCCINATE 40 MG/ML
40 INJECTION, POWDER, LYOPHILIZED, FOR SOLUTION INTRAMUSCULAR; INTRAVENOUS EVERY 12 HOURS SCHEDULED
Status: DISCONTINUED | OUTPATIENT
Start: 2024-11-14 | End: 2024-11-14

## 2024-11-14 RX ORDER — GUAIFENESIN 600 MG/1
1200 TABLET, EXTENDED RELEASE ORAL 2 TIMES DAILY
Status: DISCONTINUED | OUTPATIENT
Start: 2024-11-14 | End: 2024-11-16 | Stop reason: HOSPADM

## 2024-11-14 RX ORDER — BUDESONIDE 0.5 MG/2ML
0.5 INHALANT ORAL
Status: DISCONTINUED | OUTPATIENT
Start: 2024-11-14 | End: 2024-11-16

## 2024-11-14 RX ADMIN — METOPROLOL TARTRATE 50 MG: 50 TABLET, FILM COATED ORAL at 09:53

## 2024-11-14 RX ADMIN — FLUOXETINE HYDROCHLORIDE 20 MG: 20 CAPSULE ORAL at 09:53

## 2024-11-14 RX ADMIN — GUAIFENESIN 600 MG: 600 TABLET, EXTENDED RELEASE ORAL at 09:53

## 2024-11-14 RX ADMIN — LEVALBUTEROL HYDROCHLORIDE 1.25 MG: 1.25 SOLUTION RESPIRATORY (INHALATION) at 15:08

## 2024-11-14 RX ADMIN — NYSTATIN: 100000 POWDER TOPICAL at 09:59

## 2024-11-14 RX ADMIN — ATORVASTATIN CALCIUM 40 MG: 40 TABLET, FILM COATED ORAL at 17:16

## 2024-11-14 RX ADMIN — LISINOPRIL 20 MG: 20 TABLET ORAL at 09:53

## 2024-11-14 RX ADMIN — Medication 1 TABLET: at 09:53

## 2024-11-14 RX ADMIN — LEVALBUTEROL HYDROCHLORIDE 1.25 MG: 1.25 SOLUTION RESPIRATORY (INHALATION) at 11:30

## 2024-11-14 RX ADMIN — METHYLPREDNISOLONE SODIUM SUCCINATE 40 MG: 40 INJECTION, POWDER, FOR SOLUTION INTRAMUSCULAR; INTRAVENOUS at 13:53

## 2024-11-14 RX ADMIN — GUAIFENESIN 1200 MG: 600 TABLET, EXTENDED RELEASE ORAL at 17:16

## 2024-11-14 RX ADMIN — APIXABAN 5 MG: 5 TABLET, FILM COATED ORAL at 17:16

## 2024-11-14 RX ADMIN — METHYLPREDNISOLONE SODIUM SUCCINATE 40 MG: 40 INJECTION, POWDER, FOR SOLUTION INTRAMUSCULAR; INTRAVENOUS at 09:53

## 2024-11-14 RX ADMIN — OLANZAPINE 10 MG: 2.5 TABLET, FILM COATED ORAL at 09:53

## 2024-11-14 RX ADMIN — IPRATROPIUM BROMIDE 0.5 MG: 0.5 SOLUTION RESPIRATORY (INHALATION) at 20:15

## 2024-11-14 RX ADMIN — BUDESONIDE 0.5 MG: 0.5 INHALANT RESPIRATORY (INHALATION) at 20:15

## 2024-11-14 RX ADMIN — IPRATROPIUM BROMIDE 0.5 MG: 0.5 SOLUTION RESPIRATORY (INHALATION) at 15:08

## 2024-11-14 RX ADMIN — LEVALBUTEROL HYDROCHLORIDE 1.25 MG: 1.25 SOLUTION RESPIRATORY (INHALATION) at 07:22

## 2024-11-14 RX ADMIN — APIXABAN 5 MG: 5 TABLET, FILM COATED ORAL at 09:53

## 2024-11-14 RX ADMIN — NYSTATIN: 100000 POWDER TOPICAL at 17:20

## 2024-11-14 RX ADMIN — LEVALBUTEROL HYDROCHLORIDE 1.25 MG: 1.25 SOLUTION RESPIRATORY (INHALATION) at 20:17

## 2024-11-14 RX ADMIN — FOLIC ACID 1 MG: 1 TABLET ORAL at 09:53

## 2024-11-14 RX ADMIN — IPRATROPIUM BROMIDE 0.5 MG: 0.5 SOLUTION RESPIRATORY (INHALATION) at 11:29

## 2024-11-14 RX ADMIN — THIAMINE HCL TAB 100 MG 100 MG: 100 TAB at 09:53

## 2024-11-14 RX ADMIN — ROPINIROLE 0.5 MG: 0.25 TABLET, FILM COATED ORAL at 21:57

## 2024-11-14 RX ADMIN — METOPROLOL TARTRATE 50 MG: 50 TABLET, FILM COATED ORAL at 21:57

## 2024-11-14 RX ADMIN — NICOTINE 1 PATCH: 7 PATCH, EXTENDED RELEASE TRANSDERMAL at 09:54

## 2024-11-14 RX ADMIN — METHYLPREDNISOLONE SODIUM SUCCINATE 40 MG: 40 INJECTION, POWDER, FOR SOLUTION INTRAMUSCULAR; INTRAVENOUS at 21:57

## 2024-11-14 RX ADMIN — IPRATROPIUM BROMIDE 0.5 MG: 0.5 SOLUTION RESPIRATORY (INHALATION) at 07:22

## 2024-11-14 RX ADMIN — METHYLPREDNISOLONE SODIUM SUCCINATE 40 MG: 40 INJECTION, POWDER, FOR SOLUTION INTRAMUSCULAR; INTRAVENOUS at 01:24

## 2024-11-14 NOTE — ASSESSMENT & PLAN NOTE
POA with shortness of breath, dizziness, nausea/vomiting and diarrhea that started this morning.  She went to urgent care and she was found that her heart rate 130-150 in the office and ECG revealed afib with RVR and send to the ED for further evaluation  In ED received 15 mg of IV Cardizem x 1 & repeat EKG showed that patient had converted to sinus rhythm  Suspect new onset A-fib secondary to electrolyte imbalance, diarrhea and acute COPD exacerbation  Troponins negative x 2  [id number] 2. Transitioned to Eliquis 5mg BID  Echo: The left ventricular ejection fraction is 60%. Systolic function is normal. Wall motion is normal. Diastolic function is normal.   Metoprolol tartrate 50mg BID for rate control  Monitor on telemetry  Optimize electrolytes  Consult to Cardiology, recommendations are appreciated

## 2024-11-14 NOTE — ASSESSMENT & PLAN NOTE
in the setting of electrolyte imbalance, nausea, vomiting and diarrhea  patient converted back to sinus rhythm after receiving electrolyte replacement and Cardizem 15 mg IV times one  11/13/2024, telemetry reviewed and appears patient converted back to rapid atrial fibrillation rates 100 to 130s at approximately 430 am   11/14/2024: telemetry reviewed and patient continues to have intermittent episodes of rapid atrial fibrillation. Concern that this is been ongoing for some time at home as it is asymptomatic  will increase Lopressor to 50 mg BID starting a.m. 11/14/2024  [id number] score = 2, or 2.2% risk of stroke per year, discontinue Lovenox and start Eliquis 5 mg bid  overnight pulse oximetry to screen for sleep apnea performed on 2 L with AHI of one  discuss with patient would recommend short course of anticoagulation due to her risk of stroke with monitoring for recurrence of atrial fibrillation  encourage smoking and alcohol cessation

## 2024-11-14 NOTE — PLAN OF CARE
Problem: PAIN - ADULT  Goal: Verbalizes/displays adequate comfort level or baseline comfort level  Description: Interventions:  - Encourage patient to monitor pain and request assistance  - Assess pain using appropriate pain scale  - Administer analgesics based on type and severity of pain and evaluate response  - Implement non-pharmacological measures as appropriate and evaluate response  - Consider cultural and social influences on pain and pain management  - Notify physician/advanced practitioner if interventions unsuccessful or patient reports new pain  Outcome: Progressing     Problem: INFECTION - ADULT  Goal: Absence or prevention of progression during hospitalization  Description: INTERVENTIONS:  - Assess and monitor for signs and symptoms of infection  - Monitor lab/diagnostic results  - Monitor all insertion sites, i.e. indwelling lines, tubes, and drains  - Monitor endotracheal if appropriate and nasal secretions for changes in amount and color  - [place] appropriate cooling/warming therapies per order  - Administer medications as ordered  - Instruct and encourage patient and family to use good hand hygiene technique  - Identify and instruct in appropriate isolation precautions for identified infection/condition  Outcome: Progressing  Goal: Absence of fever/infection during neutropenic period  Description: INTERVENTIONS:  - Monitor WBC    Outcome: Progressing     Problem: INFECTION - ADULT  Goal: Absence of fever/infection during neutropenic period  Description: INTERVENTIONS:  - Monitor WBC    Outcome: Progressing     Problem: INFECTION - ADULT  Goal: Absence of fever/infection during neutropenic period  Description: INTERVENTIONS:  - Monitor WBC    Outcome: Progressing     Problem: DISCHARGE PLANNING  Goal: Discharge to home or other facility with appropriate resources  Description: INTERVENTIONS:  - Identify barriers to discharge w/patient and caregiver  - Arrange for needed discharge resources and  transportation as appropriate  - Identify discharge learning needs (meds, wound care, etc.)  - Arrange for interpretive services to assist at discharge as needed  - Refer to Case Management Department for coordinating discharge planning if the patient needs post-hospital services based on physician/advanced practitioner order or complex needs related to functional status, cognitive ability, or social support system  Outcome: Progressing     Problem: Knowledge Deficit  Goal: Patient/family/caregiver demonstrates understanding of disease process, treatment plan, medications, and discharge instructions  Description: Complete learning assessment and assess knowledge base.  Interventions:  - Provide teaching at level of understanding  - Provide teaching via preferred learning methods  Outcome: Progressing     Problem: GASTROINTESTINAL - ADULT  Goal: Minimal or absence of nausea and/or vomiting  Description: INTERVENTIONS:  - Administer IV fluids if ordered to ensure adequate hydration  - Maintain NPO status until nausea and vomiting are resolved  - Nasogastric tube if ordered  - Administer ordered antiemetic medications as needed  - Provide nonpharmacologic comfort measures as appropriate  - Advance diet as tolerated, if ordered  - Consider nutrition services referral to assist patient with adequate nutrition and appropriate food choices  Outcome: Progressing  Goal: Maintains or returns to baseline bowel function  Description: INTERVENTIONS:  - Assess bowel function  - Encourage oral fluids to ensure adequate hydration  - Administer IV fluids if ordered to ensure adequate hydration  - Administer ordered medications as needed  - Encourage mobilization and activity  - Consider nutritional services referral to assist patient with adequate nutrition and appropriate food choices  Outcome: Progressing     Problem: RESPIRATORY - ADULT  Goal: Achieves optimal ventilation and oxygenation  Description: INTERVENTIONS:  - Assess for  changes in respiratory status  - Assess for changes in mentation and behavior  - Position to facilitate oxygenation and minimize respiratory effort  - Oxygen administered by appropriate delivery if ordered  - Initiate smoking cessation education as indicated  - Encourage broncho-pulmonary hygiene including cough, deep breathe, Incentive Spirometry  - Assess the need for suctioning and aspirate as needed  - Assess and instruct to report SOB or any respiratory difficulty  - Respiratory Therapy support as indicated  Outcome: Progressing

## 2024-11-14 NOTE — ASSESSMENT & PLAN NOTE
Has smoked 4 cigarettes daily for the last 1 year, but was previously a pack a day smoker for at least 40 years.  Recommend complete cessation.  NRT per primary team.

## 2024-11-14 NOTE — PROGRESS NOTES
Progress Note - Hospitalist   Name: Sophie Jamil 59 y.o. female I MRN: [id number]  Unit/Bed#: [address] [place] [address] I Date of Admission: 11/11/2024   Date of Service: 11/14/2024 I Hospital Day: 3    Assessment & Plan  New onset a-fib (HCC)  POA with shortness of breath, dizziness, nausea/vomiting and diarrhea that started this morning.  She went to urgent care and she was found that her heart rate 130-150 in the office and ECG revealed afib with RVR and send to the ED for further evaluation  In ED received 15 mg of IV Cardizem x 1 & repeat EKG showed that patient had converted to sinus rhythm  Suspect new onset A-fib secondary to electrolyte imbalance, diarrhea and acute COPD exacerbation  Troponins negative x 2  [id number] 2. Transitioned to Eliquis 5mg BID  Echo: The left ventricular ejection fraction is 60%. Systolic function is normal. Wall motion is normal. Diastolic function is normal.   Metoprolol tartrate 50mg BID for rate control  Monitor on telemetry  Optimize electrolytes  Consult to Cardiology, recommendations are appreciated  Chronic obstructive pulmonary disease with acute exacerbation (HCC)  POA with shortness of breath and expiratory wheezing starting the morning of admission. Per patient ran out of home albuterol inhaler  D dimer elevated: 1.07  CTA chest: Negative for pulmonary embolism. Mild centrilobular emphysema. Left lower lobe calcified granuloma. Minimal scarring at the base of the lingula there is no tracheal or endobronchial lesion  Continue Xoponex and Atrovent nebulizers 4 times daily  IV Solumedrol 40mg TID  Completed 3 day course of azithromycin  Currently on 2L NC, titrate oxygen as able to maintain O2 89% or greater  Respiratory protocol  Consult to Pulmonology, recommendations are appreciated  Diarrhea  POA with over 10 episodes of diarrhea and 5 episodes of vomiting that started the morning of admission  Denies any recent antibiotic usage or travel. Denies abdominal pain,denies fever, or  chills.  In ED, received 1 dose of Imodium  Noted afebrile, leukocytosis WBC 14.64 , procal negative  CT A/P wcontrast: Negative for acute pathology. Hepatic steatosis. Stable 3.4 cm left adrenal gland nodule.no diverticulitis noted  Cdiff PCR negative. Bacterial enteric panel could not be collected  Hold off further doses of imodium  Monitor off antibiotics  Patient reports no further diarrhea since admission  Nicotine dependence  Smokes 0.5 PPD for greater than 50 years  Continue nicotine patch  Smoking cessation provided     ETOH abuse  Per patient drinks 40 oz beer daily. Per patient last drink was prior to ED arrival  Denies w/d seizures  Not in acute withdrawal  Ethanol level negative  Continue CIWA protocol   Initiate multivitamin,  thiamine and folic acid  Complete alcohol cessation discussed with patient  MDD (major depressive disorder)  Mood stable  Continue home Prozac and Zyprexa      VTE Pharmacologic Prophylaxis:   Moderate Risk (Score 3-4) - Pharmacological DVT Prophylaxis Ordered: apixaban (Eliquis).    Mobility:   Basic Mobility Inpatient Raw Score: 13  JH-HLM Goal: 4: Move to chair/commode  JH-HLM Achieved: 6: Walk 10 steps or more  JH-HLM Goal NOT achieved. Continue with multidisciplinary rounding and encourage appropriate mobility to improve upon JH-HLM goals.    Patient Centered Rounds: I performed bedside rounds with nursing staff today.   Discussions with Specialists or Other Care Team Provider: Nursing, Cardiology, Pulmonology    Education and Discussions with Family / Patient: Updated  (significant other) at bedside.    Current Length of Stay: 3 day(s)  Current Patient Status: Inpatient   Certification Statement: The patient will continue to require additional inpatient hospital stay due to COPD exacerbation, afib RVR  Discharge Plan: Anticipate discharge in 48-72 hrs to home.    Code Status: Level 1 - Full Code    Subjective   Patient sitting upright in bed. Reports coughing  following her neb treatment this morning. Still reports wheezing and SOB. Denies any chest pain or palpitations    Objective :  Temp:  [98.8 °F (37.1 °C)-99.3 °F (37.4 °C)] 99.3 °F (37.4 °C)  HR:  [] 77  BP: (107-149)/(52-92) 139/88  Resp:  [16-20] 16  SpO2:  [91 %-97 %] 96 %  O2 Device: Nasal cannula  Nasal Cannula O2 Flow Rate (L/min):  [2 L/min] 2 L/min    Body mass index is 31.25 kg/m².     Input and Output Summary (last 24 hours):     Intake/Output Summary (Last 24 hours) at 11/14/2024 0958  Last data filed at 11/14/2024 0618  Gross per 24 hour   Intake 240 ml   Output 900 ml   Net -660 ml       Physical Exam  Vitals and nursing note reviewed.   Constitutional:       General: She is not in acute distress.     Appearance: She is well-developed. She is ill-appearing.   HENT:      Head: Normocephalic and atraumatic.      Nose: Congestion present.   Eyes:      Conjunctiva/sclera: Conjunctivae normal.   Cardiovascular:      Rate and Rhythm: Tachycardia present. Rhythm irregular.      Heart sounds: No murmur heard.  Pulmonary:      Effort: Pulmonary effort is normal. No respiratory distress.      Breath sounds: Wheezing present.   Abdominal:      Palpations: Abdomen is soft.      Tenderness: There is no abdominal tenderness.   Musculoskeletal:         General: No swelling.      Cervical back: Neck supple.      Right lower leg: No edema.      Left lower leg: No edema.   Skin:     General: Skin is warm and dry.      Capillary Refill: Capillary refill takes less than 2 seconds.   Neurological:      Mental Status: She is alert. Mental status is at baseline.   Psychiatric:         Mood and Affect: Mood normal.           Lines/Drains:        Telemetry:  Telemetry Orders (From admission, onward)               24 Hour Telemetry Monitoring  Continuous x 24 Hours (Telem)        Question:  Reason for 24 Hour Telemetry  Answer:  Decompensated CHF- and any one of the following: continuous diuretic infusion or total diuretic  dose >200 mg daily, associated electrolyte derangement (I.e. K < 3.0), ionotropic drip (continuous infusion), hx of ventricular arrhythmia, or new EF < 35%                     Telemetry Reviewed: Atrial fibrillation. HR averaging 100-110  Indication for Continued Telemetry Use: Arrthymias requiring medical therapy               Lab Results: I have reviewed the following results:   Results from last 7 days   Lab Units 11/14/24  0540 11/13/24  0455 11/12/24  0450   WBC Thousand/uL 14.63*   < > 11.85*   HEMOGLOBIN g/dL 11.1*   < > 13.0   HEMATOCRIT % 34.0*   < > 38.0   PLATELETS Thousands/uL 245   < > 266   SEGS PCT %  --   --  95*   LYMPHO PCT %  --   --  4*   MONO PCT %  --   --  1*   EOS PCT %  --   --  0    < > = values in this interval not displayed.     Results from last 7 days   Lab Units 11/14/24  0540 11/12/24  0450 11/11/24  1705   SODIUM mmol/L 141   < > 128*   POTASSIUM mmol/L 4.1   < > 3.8   CHLORIDE mmol/L 109*   < > 96   CO2 mmol/L 26   < > 14*   BUN mg/dL 10   < > 7   CREATININE mg/dL 0.56*   < > 0.79   ANION GAP mmol/L 6   < > 18*   CALCIUM mg/dL 8.8   < > 8.7   ALBUMIN g/dL  --   --  4.2   TOTAL BILIRUBIN mg/dL  --   --  0.75   ALK PHOS U/L  --   --  102   ALT U/L  --   --  81*   AST U/L  --   --  153*   GLUCOSE RANDOM mg/dL 150*   < > 145*    < > = values in this interval not displayed.                 Results from last 7 days   Lab Units 11/11/24  2245   PROCALCITONIN ng/ml 0.08       Recent Cultures (last 7 days):   Results from last 7 days   Lab Units 11/11/24  2238   C DIFF TOXIN B BY PCR  Negative       Imaging Results Review: No pertinent imaging studies reviewed.  Other Study Results Review: No additional pertinent studies reviewed.    Last 24 Hours Medication List:     Current Facility-Administered Medications:     acetaminophen (TYLENOL) tablet 650 mg, Q6H PRN    apixaban (ELIQUIS) tablet 5 mg, BID    atorvastatin (LIPITOR) tablet 40 mg, Daily With Dinner    diltiazem (CARDIZEM) injection 15  mg, Once    FLUoxetine (PROzac) capsule 20 mg, Daily    folic acid (FOLVITE) tablet 1 mg, Daily    guaiFENesin (MUCINEX) 12 hr tablet 600 mg, BID    ipratropium (ATROVENT) 0.02 % inhalation solution 0.5 mg, 4x Daily    ipratropium (ATROVENT) 0.02 % inhalation solution 0.5 mg, Q6H PRN    levalbuterol (XOPENEX) inhalation solution 1.25 mg, Q6H PRN **AND** sodium chloride 0.9 % inhalation solution 3 mL, Q6H PRN    levalbuterol (XOPENEX) inhalation solution 1.25 mg, 4x Daily    lisinopril (ZESTRIL) tablet 20 mg, Daily    methylPREDNISolone sodium succinate (Solu-MEDROL) injection 40 mg, Q8H HENRY    metoprolol tartrate (LOPRESSOR) tablet 50 mg, Q12H HENRY    multivitamin-minerals (CENTRUM) tablet 1 tablet, Daily    nicotine (NICODERM CQ) 7 mg/24hr TD 24 hr patch 1 patch, Daily    nystatin (MYCOSTATIN) powder, BID    OLANZapine (ZyPREXA) tablet 10 mg, QAM    ondansetron (ZOFRAN) injection 4 mg, Q6H PRN    rOPINIRole (REQUIP) tablet 0.5 mg, HS    thiamine tablet 100 mg, Daily    Administrative Statements   Today, Patient Was Seen By: Kerri Pena PA-C  I have spent a total time of 30 minutes in caring for this patient on the day of the visit/encounter including Diagnostic results, Risks and benefits of tx options, Instructions for management, Patient and family education, Importance of tx compliance, Counseling / Coordination of care, Documenting in the medical record, Reviewing / ordering tests, medicine, procedures  , Obtaining or reviewing history  , and Communicating with other healthcare professionals .    **Please Note: This note may have been constructed using a voice recognition system.**

## 2024-11-14 NOTE — PLAN OF CARE
Problem: PHYSICAL THERAPY ADULT  Goal: Performs mobility at highest level of function for planned discharge setting.  See evaluation for individualized goals.  Description: Treatment/Interventions: ADL retraining, LE strengthening/ROM, Functional transfer training, Therapeutic exercise, Endurance training, Bed mobility, Gait training, Spoke to nursing          See flowsheet documentation for full assessment, interventions and recommendations.  Note: Prognosis: Good  Problem List: Decreased strength, Decreased endurance, Decreased mobility, Impaired balance, Decreased safety awareness  Assessment: Patient seen for Physical Therapy evaluation. Patient admitted with New onset a-fib (HCC).  Comorbidities affecting patient's physical performance include: Afib, anxiety, cardiac disease, COPD, depression, hypertension, and hyperlipidemia.  Personal factors affecting patient at time of initial evaluation include: lives in 1 story house, inability to navigate community distances, and inability to perform IADLS . Prior to admission, patient was independent with functional mobility without assistive device, independent with ADLS, requiring assist for IADLS, living alone in 1 story home with 0 steps to enter, and ambulating household distance.  Please find objective findings from Physical Therapy assessment regarding body systems outlined above with impairments and limitations including weakness, impaired balance, decreased endurance, gait deviations, pain, decreased activity tolerance, decreased functional mobility tolerance, fall risk, and SOB upon exertion.  The Barthel Index was used as a functional outcome tool presenting with a score of Barthel Index Score: 50 today indicating marked limitations of functional mobility and ADLS.  Patient's clinical presentation is currently unstable/unpredictable as seen in patient's presentation of changing level of pain, increased fall risk, new onset of impairment of functional  mobility, and decreased endurance. Pt would benefit from continued Physical Therapy treatment to address deficits as defined above and maximize level of functional mobility. As demonstrated by objective findings, the assigned level of complexity for this evaluation is high.The patient's AM-[facility] Basic Mobility Inpatient Short Form Raw Score is 18. A Raw score of greater than 16 suggests the patient may benefit from discharge to home. Please also refer to the recommendation of the Physical Therapist for safe discharge planning.        Rehab Resource Intensity Level, PT: III (Minimum Resource Intensity)    See flowsheet documentation for full assessment.

## 2024-11-14 NOTE — PROGRESS NOTES
Progress Note - Cardiology   Name: Sophie Jamil 59 y.o. female I MRN: [id number]  Unit/Bed#: [address] I Date of Admission: 11/11/2024   Date of Service: 11/14/2024 I Hospital Day: 3    Assessment & Plan  New onset a-fib (HCC)  in the setting of electrolyte imbalance, nausea, vomiting and diarrhea  patient converted back to sinus rhythm after receiving electrolyte replacement and Cardizem 15 mg IV times one  11/13/2024, telemetry reviewed and appears patient converted back to rapid atrial fibrillation rates 100 to 130s at approximately 430 am   11/14/2024: telemetry reviewed and patient continues to have intermittent episodes of rapid atrial fibrillation. Concern that this is been ongoing for some time at home as it is asymptomatic  will increase Lopressor to 50 mg BID starting a.m. 11/14/2024  [id number] score = 2, or 2.2% risk of stroke per year, discontinue Lovenox and start Eliquis 5 mg bid  overnight pulse oximetry to screen for sleep apnea performed on 2 L with AHI of one  discuss with patient would recommend short course of anticoagulation due to her risk of stroke with monitoring for recurrence of atrial fibrillation  encourage smoking and alcohol cessation  Chronic obstructive pulmonary disease with acute exacerbation (HCC)  long history of tobacco abuse  noted wheezing intermittently today  11/11/2024 CT chest PE protocol, negative for PE but presence of mild centrilobular emphysema noted  patient ordered for azithromycin, Xopenex and IV steroids per primary team  ETOH abuse  per patient she drinks approximately 40 ounces of beer daily and occasional wine  CIWA protocol per primary team  Diarrhea  per patient's sudden onset of diarrhea with over 10 episodes prior to be in seen at urgent care  no recent travel or antibiotic usage  no further diarrhea or vomiting since admission to hospital  11/11/2024 CT abdomen and pelvis: no acute inflammatory process, concern for hepatic steatosis  workup her primary  team  Nicotine dependence  smokes approximately one half pack cigarettes per day for over 50 years  encourage smoking cessation  MDD (major depressive disorder)  patient does note she does not really go outside due to her depression  currently on Prozac and Zyprexa    Subjective   Chief Complaint: Patient seen and examined, telemetry reviewed. Continues to have intermittent episodes of rapid atrial fibrillation which are asymptomatic with patient. Concern that this is been ongoing at home for some period of time and has not been detected. Will increase beta blocker and continue blood thinner. Overnight pulse ox screening for sleep apnea was negative      Objective :  Temp:  [98.8 °F (37.1 °C)-99 °F (37.2 °C)] 98.8 °F (37.1 °C)  HR:  [] 97  BP: (107-149)/(52-92) 149/92  Resp:  [16-20] 16  SpO2:  [91 %-97 %] 94 %  O2 Device: Nasal cannula  Nasal Cannula O2 Flow Rate (L/min):  [2 L/min] 2 L/min  Orthostatic Blood Pressures      Flowsheet Row Most Recent Value   Blood Pressure 149/92 filed at 11/14/2024 0549   Patient Position - Orthostatic VS Lying filed at 11/13/2024 1526          First Weight: Weight - Scale: 72.7 kg (160 lb 4.4 oz) (11/11/24 2322)  Vitals:    11/11/24 2322 11/12/24 0920   Weight: 72.7 kg (160 lb 4.4 oz) 72.6 kg (160 lb)     Physical Exam  Vitals and nursing note reviewed.   Constitutional:       Appearance: Normal appearance. She is obese. She is ill-appearing (Chronically ill appearing).   HENT:      Right Ear: External ear normal.      Left Ear: External ear normal.   Eyes:      General: No scleral icterus.        Right eye: No discharge.         Left eye: No discharge.   Cardiovascular:      Rate and Rhythm: Tachycardia present. Rhythm irregular.      Pulses: Normal pulses.   Pulmonary:      Effort: Pulmonary effort is normal. No accessory muscle usage or respiratory distress.      Breath sounds: Wheezing and rhonchi present.      Comments: Breath sounds with intermittent  wheezing  Abdominal:      General: Bowel sounds are normal.      Palpations: Abdomen is soft.   Musculoskeletal:      Right lower leg: No edema.      Left lower leg: No edema.   Skin:     General: Skin is warm and dry.      Capillary Refill: Capillary refill takes less than 2 seconds.   Neurological:      General: No focal deficit present.      Mental Status: She is alert and oriented to person, place, and time. Mental status is at baseline.   Psychiatric:         Mood and Affect: Mood normal.           Lab Results: I have reviewed the following results:  Results from last 7 days   Lab Units 11/14/24  0540 11/13/24 0455 11/12/24 0450   WBC Thousand/uL 14.63* 15.66* 11.85*   HEMOGLOBIN g/dL 11.1* 11.2* 13.0   HEMATOCRIT % 34.0* 34.0* 38.0   PLATELETS Thousands/uL 245 250 266     Results from last 7 days   Lab Units 11/14/24  0540 11/13/24 0455 11/12/24 0450   POTASSIUM mmol/L 4.1 3.9 3.6   CHLORIDE mmol/L 109* 107 105   CO2 mmol/L 26 24 18*   BUN mg/dL 10 5 4*   CREATININE mg/dL 0.56* 0.59* 0.64   CALCIUM mg/dL 8.8 9.0 8.7         Lab Results   Component Value Date    HGBA1C 5.9 (H) 03/19/2024     Lab Results   Component Value Date    TROPONINI <0.02 02/14/2020       EKG/Telemetry: still with intermittent periods of rapid atrial fibrillation    VTE Pharmacologic Prophylaxis: VTE covered by:  apixaban, Oral, 5 mg at 11/13/24 3795     VTE Mechanical Prophylaxis: sequential compression device    Melissa DOUGHERTY  Cardiology

## 2024-11-14 NOTE — ASSESSMENT & PLAN NOTE
Did have desaturation to 88% yesterday.  Currently saturating 96% on 2 L nasal cannula.  Supplemental oxygen to maintain saturations greater than or equal to 89%.  Activity as tolerated.  Ambulatory pulse ox prior to discharge.

## 2024-11-14 NOTE — ASSESSMENT & PLAN NOTE
POA with shortness of breath and expiratory wheezing starting the morning of admission. Per patient ran out of home albuterol inhaler  D dimer elevated: 1.07  CTA chest: Negative for pulmonary embolism. Mild centrilobular emphysema. Left lower lobe calcified granuloma. Minimal scarring at the base of the lingula there is no tracheal or endobronchial lesion  Continue Xoponex and Atrovent nebulizers 4 times daily  IV Solumedrol 40mg TID  Completed 3 day course of azithromycin  Currently on 2L NC, titrate oxygen as able to maintain O2 89% or greater  Respiratory protocol  Consult to Pulmonology, recommendations are appreciated

## 2024-11-14 NOTE — ASSESSMENT & PLAN NOTE
Home regimen is albuterol 4 times daily.  Continue Solu-Medrol 40 mg IV every 8 hours today.  Complete 3 days of azithromycin.  Follow-up viral testing.  Continue Xopenex/ipratropium 4 times daily and add budesonide twice daily.  At discharge, recommend initiation of ICS/LABA/LAMA with albuterol as needed.  She would benefit from having a nebulizer at home.

## 2024-11-14 NOTE — PHYSICAL THERAPY NOTE
PHYSICAL THERAPY EVALUATION/TREATMENT       11/14/24 1440   PT Last Visit   PT Visit Date 11/14/24   Note Type   Note type Evaluation   Pain Assessment   Pain Assessment Tool 0-10   Pain Score No Pain   Patient's Stated Pain Goal No pain   Multiple Pain Sites No   Restrictions/Precautions   Weight Bearing Precautions Per Order No   Other Precautions Bed Alarm;Chair Alarm;Fall Risk;Pain;O2   Home Living   Type of Home Apartment   Home Layout One level;Performs ADLs on one level;Able to live on main level with bedroom/bathroom  (no YARY)   Bathroom Shower/Tub Walk-in shower   Bathroom Toilet Standard   Additional Comments no DME, IND PTA ; Boyfriend peforms IADLs + drives   Prior Function   Level of [place] Independent with ADLs;Independent with functional mobility;Needs assistance with IADLS   Lives With Alone  (pt reports boyfriend lives in apartment upstairs/frequently with her)   Receives Help From Friend(s)   IADLs Family/Friend/Other provides transportation   General   Additional Pertinent History Pt is a 59 year-old female who was admitted to the hospital on 11/11/24 due to Afib, diarrhea. Pt with history of COPD currently on 2L O2 ; negative for COVID   Family/Caregiver Present Yes  (boyfriend at bedside throughout session)   Cognition   Overall Cognitive Status WFL   Arousal/Participation Cooperative   Orientation Level Oriented X4   Following Commands Follows all commands and directions without difficulty   RLE Assessment   RLE Assessment WFL   LLE Assessment   LLE Assessment WFL   Bed Mobility   Supine to Sit 5  Supervision   Additional items Assist x 1;Verbal cues;Increased time required;HOB elevated;Bedrails   Sit to Supine Unable to assess   Additional Comments transferred to bedside chair   Transfers   Sit to Stand 5  Supervision   Stand to Sit 5  Supervision   Ambulation/Elevation   Gait pattern Short stride;Step through pattern   Gait Assistance 5  Supervision   Additional items Verbal cues    Assistive Device None   Distance 15 feet   Stair Management Assistance Not tested   Balance   Static Sitting Fair +   Dynamic Sitting Fair   Static Standing Fair   Dynamic Standing Fair   Ambulatory Fair   Activity Tolerance   Activity Tolerance Patient limited by fatigue   Nurse Made Aware yes BIGG [place]   Assessment   Prognosis Good   Problem List Decreased strength;Decreased endurance;Decreased mobility;Impaired balance;Decreased safety awareness   Assessment Patient seen for Physical Therapy evaluation. Patient admitted with New onset a-fib (HCC).  Comorbidities affecting patient's physical performance include: Afib, anxiety, cardiac disease, COPD, depression, hypertension, and hyperlipidemia.  Personal factors affecting patient at time of initial evaluation include: lives in 1 story house, inability to navigate community distances, and inability to perform IADLS . Prior to admission, patient was independent with functional mobility without assistive device, independent with ADLS, requiring assist for IADLS, living alone in 1 story home with 0 steps to enter, and ambulating household distance.  Please find objective findings from Physical Therapy assessment regarding body systems outlined above with impairments and limitations including weakness, impaired balance, decreased endurance, gait deviations, pain, decreased activity tolerance, decreased functional mobility tolerance, fall risk, and SOB upon exertion.  The Barthel Index was used as a functional outcome tool presenting with a score of Barthel Index Score: 50 today indicating marked limitations of functional mobility and ADLS.  Patient's clinical presentation is currently unstable/unpredictable as seen in patient's presentation of changing level of pain, increased fall risk, new onset of impairment of functional mobility, and decreased endurance. Pt would benefit from continued Physical Therapy treatment to address deficits as defined above and maximize  level of functional mobility. As demonstrated by objective findings, the assigned level of complexity for this evaluation is high.The patient's AM-PAC Basic Mobility Inpatient Short Form Raw Score is 18. A Raw score of greater than 16 suggests the patient may benefit from discharge to home. Please also refer to the recommendation of the Physical Therapist for safe discharge planning.   Goals   Patient Goals to feel better and return home   STG Expiration Date 11/21/24   Short Term Goal #1 Pt will be IND with bed mobility/transfers ; Standing balance will improve to fair+ to decrease risk of falls ; Pt will ambulate x 50 feet IND with minimal SOB ; AMPAC score will improve >20/24 to demonstrate improved functional independence   LTG Expiration Date 11/28/24   Long Term Goal #1 Standing balance will improve to good to decrease risk of falls ; Pt will ambulate x 150 feet IND with minimal SOB ; AMPAC score will improve >22/24 to demonstrate improved functional independence   Plan   Treatment/Interventions ADL retraining;LE strengthening/ROM;Functional transfer training;Therapeutic exercise;Endurance training;Bed mobility;Gait training;Spoke to nursing   PT Frequency 3-5x/wk   Discharge Recommendation   Rehab Resource Intensity Level, PT III (Minimum Resource Intensity)   AM-PAC Basic Mobility Inpatient   Turning in Flat Bed Without Bedrails 3   Lying on Back to Sitting on Edge of Flat Bed Without Bedrails 3   Moving Bed to Chair 3   Standing Up From Chair Using Arms 3   Walk in Room 3   Climb 3-5 Stairs With Railing 3   Basic Mobility Inpatient Raw Score 18   Basic Mobility Standardized Score 41.05   [facility] Highest Level Of Mobility   -HLM Goal 6: Walk 10 steps or more   -HLM Achieved 7: Walk 25 feet or more   Barthel Index   Feeding 10   Bathing 0   Grooming Score 5   Dressing Score 5   Bladder Score 5   Bowels Score 10   Toilet Use Score 5   Transfers (Bed/Chair) Score 10   Mobility (Level Surface) Score 0    Stairs Score 0   Barthel Index Score 50   Additional Treatment Session   Start Time 1430   End Time 1440   Treatment Assessment S: Pt reports needing to urinate. O/A: Pt able to ambulate short distance with supervision without AD. Pt requires increased time + verbal cues for hand placement/safety to sit on commode. IND with pericare while seated. Able to ambulate additional 15 feet with supervision without AD, no LOB noted throughout session. Pt easily fatigued, repositioned in bedside chair with all needs within reach. P: pt will benefit from skilled PT to address deficits to maximize IND for safe d/c  (O2 > 88% throughout session, recovered >90% within 1 min while seated)   Equipment Use O2   End of Consult   Patient Position at End of Consult Bedside chair;Bed/Chair alarm activated;All needs within reach   Licensure   [place] License Number  [employer]ak[employer] [id number]

## 2024-11-14 NOTE — CONSULTS
Consultation - Pulmonology   Name: Sophie Jamil 59 y.o. female I MRN: [id number]  Unit/Bed#: [address] [address] I Date of Admission: 11/11/2024   Date of Service: 11/14/2024 I Hospital Day: 3   Inpatient consult to Pulmonology  Consult performed by: LADONNA Cruz  Consult ordered by: Kerri Pena PA-C        Physician Requesting Evaluation: Cabrera Gonzalez MD   Reason for Evaluation / Principal Problem: COPD with acute exacerbation    Assessment & Plan  New onset a-fib (HCC)  Management per cardiology.  Nicotine dependence  Has smoked 4 cigarettes daily for the last 1 year, but was previously a pack a day smoker for at least 40 years.  Recommend complete cessation.  NRT per primary team.  Chronic obstructive pulmonary disease with acute exacerbation (HCC)  Home regimen is albuterol 4 times daily.  Continue Solu-Medrol 40 mg IV every 8 hours today.  Complete 3 days of azithromycin.  Follow-up viral testing.  Continue Xopenex/ipratropium 4 times daily and add budesonide twice daily.  At discharge, recommend initiation of ICS/LABA/LAMA with albuterol as needed.  She would benefit from having a nebulizer at home.  Acute respiratory failure with hypoxia (HCC)  Did have desaturation to 88% yesterday.  Currently saturating 96% on 2 L nasal cannula.  Supplemental oxygen to maintain saturations greater than or equal to 89%.  Activity as tolerated.  Ambulatory pulse ox prior to discharge.    I have discussed the above management plan in detail with the primary service.   Pulmonology service will follow.  Please contact the SecureChat role for the Pulmonology service with any questions/concerns.    History of Present Illness   Sophie Jamil is a 59 y.o. female who presents with nausea, vomiting, diarrhea, and shortness of breath.  Sophie reports for at least the last 1 month that she has had the symptoms, along with associated fatigue and weakness.  She has had a poor appetite.  She did note some weight gain despite this.   She has noted consistent wheezing on a daily basis, as well as dry cough.  Chronically, she does have dyspnea with dry cough on a daily basis.  She is able to complete ADLs, but needs to rest frequently.  She cannot climb a flight of stairs without becoming severely dyspneic.  She is not very active at home.  She reports that she was diagnosed approximately 10 years ago with COPD and maintains on albuterol 4 times daily per her PCP.  She has not had the albuterol for the last 1 month because she ran out of it and was to go to her doctor.  Aside from the above, no other complaints.    Review of Systems   All other systems reviewed and are negative.  A full 12-point review of systems was completed and is negative except for those outlined in the HPI.    Historical Information   I have reviewed the patient's PMH, PSH, Social History, Family History, Meds, and Allergies  Historical Information   Past Medical History:   Diagnosis Date    Anxiety     COPD (chronic obstructive pulmonary disease) (HCC)     COPD exacerbation (HCC) 8/12/2016    Dental abscess 10/2020    Depression     ETOH abuse     Facial abscess 8/12/2016     History reviewed. No pertinent surgical history.  Social History     Tobacco Use    Smoking status: Every Day     Current packs/day: 0.25     Types: Cigarettes    Smokeless tobacco: Never    Tobacco comments:     smokes 1 pack in 3 days   Vaping Use    Vaping status: Never Used   Substance and Sexual Activity    Alcohol use: Not Currently     Comment: states drank today had some beer a couple hours ago    Drug use: No    Sexual activity: Not on file     E-Cigarette/Vaping    E-Cigarette Use Never User      E-Cigarette/Vaping Substances    Nicotine No     THC No     CBD No     Flavoring No     Other No     Unknown No        Current Facility-Administered Medications:     acetaminophen (TYLENOL) tablet 650 mg, Q6H PRN    apixaban (ELIQUIS) tablet 5 mg, BID    atorvastatin (LIPITOR) tablet 40 mg, Daily  With Dinner    diltiazem (CARDIZEM) injection 15 mg, Once    FLUoxetine (PROzac) capsule 20 mg, Daily    folic acid (FOLVITE) tablet 1 mg, Daily    guaiFENesin (MUCINEX) 12 hr tablet 600 mg, BID    ipratropium (ATROVENT) 0.02 % inhalation solution 0.5 mg, 4x Daily    ipratropium (ATROVENT) 0.02 % inhalation solution 0.5 mg, Q6H PRN    levalbuterol (XOPENEX) inhalation solution 1.25 mg, Q6H PRN **AND** sodium chloride 0.9 % inhalation solution 3 mL, Q6H PRN    levalbuterol (XOPENEX) inhalation solution 1.25 mg, 4x Daily    lisinopril (ZESTRIL) tablet 20 mg, Daily    methylPREDNISolone sodium succinate (Solu-MEDROL) injection 40 mg, Q8H HENRY    metoprolol tartrate (LOPRESSOR) tablet 50 mg, Q12H HENRY    multivitamin-minerals (CENTRUM) tablet 1 tablet, Daily    nicotine (NICODERM CQ) 7 mg/24hr TD 24 hr patch 1 patch, Daily    nystatin (MYCOSTATIN) powder, BID    OLANZapine (ZyPREXA) tablet 10 mg, QAM    ondansetron (ZOFRAN) injection 4 mg, Q6H PRN    rOPINIRole (REQUIP) tablet 0.5 mg, HS    thiamine tablet 100 mg, Daily  Prior to Admission Medications   Prescriptions Last Dose Informant Patient Reported? Taking?   FLUoxetine (PROzac) 20 mg capsule Not Taking Self No No   Sig: Take 1 capsule (20 mg total) by mouth daily   Patient not taking: Reported on 11/11/2024   OLANZapine (ZyPREXA) 10 mg tablet   No No   Sig: Take 1 tablet (10 mg total) by mouth every morning   QUEtiapine (SEROquel) 100 mg tablet   No No   Sig: Take 2 tablets (200 mg total) by mouth daily at bedtime   albuterol (PROVENTIL HFA,VENTOLIN HFA) 90 mcg/act inhaler Not Taking Self No No   Sig: Inhale 2 puffs every 6 (six) hours as needed for wheezing for up to 14 doses   Patient not taking: Reported on 11/11/2024   ergocalciferol (VITAMIN D2) 50,000 units Past Week  No Yes   Sig: Take 1 capsule (50,000 Units total) by mouth once a week   lisinopril (ZESTRIL) 20 mg tablet 11/11/2024 at 0900  No Yes   Sig: TAKE 1 TABLET (20 MG TOTAL) BY MOUTH DAILY    rOPINIRole (REQUIP) 0.5 mg tablet Unknown  No No   Sig: Take 1 tablet (0.5 mg total) by mouth daily at bedtime   rosuvastatin (CRESTOR) 20 MG tablet 11/11/2024 at 0900  No Yes   Sig: TAKE 1 TABLET (20 MG TOTAL) BY MOUTH DAILY      Facility-Administered Medications: None     Bee venom    Objective :  Temp:  [98.8 °F (37.1 °C)-99 °F (37.2 °C)] 98.8 °F (37.1 °C)  HR:  [] 97  BP: (107-149)/(52-92) 149/92  Resp:  [16-20] 16  SpO2:  [91 %-97 %] 94 %  O2 Device: Nasal cannula  Nasal Cannula O2 Flow Rate (L/min):  [2 L/min] 2 L/min    Physical Exam  Vitals reviewed.   Constitutional:       General: She is not in acute distress.     Appearance: She is well-developed. She is obese. She is not toxic-appearing or diaphoretic.      Interventions: Nasal cannula in place.   HENT:      Head: Normocephalic and atraumatic.   Eyes:      General: No scleral icterus.     Extraocular Movements: Extraocular movements intact.   Neck:      Trachea: No tracheal deviation.   Cardiovascular:      Rate and Rhythm: Normal rate and regular rhythm.      Heart sounds: S1 normal and S2 normal. No murmur heard.     No friction rub. No gallop.   Pulmonary:      Effort: Pulmonary effort is normal. No tachypnea, accessory muscle usage or respiratory distress.      Breath sounds: No stridor. Wheezing present. No decreased breath sounds, rhonchi or rales.   Chest:      Chest wall: No tenderness.   Abdominal:      General: Bowel sounds are normal. There is no distension.      Palpations: Abdomen is soft.      Tenderness: There is no abdominal tenderness.   Musculoskeletal:      Cervical back: Neck supple.      Right lower leg: No edema.      Left lower leg: No edema.   Skin:     General: Skin is warm and dry.      Findings: No rash.   Neurological:      Mental Status: She is alert and oriented to person, place, and time.      GCS: GCS eye subscore is 4. GCS verbal subscore is 5. GCS motor subscore is 6.   Psychiatric:         Speech: Speech normal.          Behavior: Behavior normal. Behavior is cooperative.           Lab Results: I have reviewed the following results:  .     11/14/24  0540   WBC 14.63*   HGB 11.1*   HCT 34.0*      SODIUM 141   K 4.1   *   CO2 26   BUN 10   CREATININE 0.56*   GLUC 150*   MG 2.2     BNP 78    Chest x-ray personally reviewed and shows no acute infiltrate.  CTA chest shows no evidence of PE, but does show mild centrilobular emphysematous changes.  Echocardiogram shows EF 60% with RV size and systolic function normal.

## 2024-11-14 NOTE — PROGRESS NOTES
Patient:  MAYDA DOMÍNGUEZ    MRN:  [id number]    rEikain Request ID:  [id number]    Level of care reserved:  Home Health Agency    Partner Reserved:  [place] [address] [phone]    Clinical needs requested:    Geography searched:  10 miles around [address]    Start of Service:    Request sent:  12:26pm EST on 11/13/2024 by Selin Sims    Partner reserved:  12:18pm EST on 11/14/2024 by Selin Sims    Choice list shared:  12:18pm EST on 11/14/2024 by Selin Sims

## 2024-11-15 LAB
ANION GAP SERPL CALCULATED.3IONS-SCNC: 6 MMOL/L (ref 4–13)
BUN SERPL-MCNC: 14 MG/DL (ref 5–25)
CALCIUM SERPL-MCNC: 8.9 MG/DL (ref 8.4–10.2)
CHLORIDE SERPL-SCNC: 106 MMOL/L (ref 96–108)
CO2 SERPL-SCNC: 28 MMOL/L (ref 21–32)
CREAT SERPL-MCNC: 0.6 MG/DL (ref 0.6–1.3)
DME PARACHUTE DELIVERY DATE ACTUAL: NORMAL
DME PARACHUTE DELIVERY DATE REQUESTED: NORMAL
DME PARACHUTE DELIVERY NOTE: NORMAL
DME PARACHUTE ITEM DESCRIPTION: NORMAL
DME PARACHUTE ORDER STATUS: NORMAL
DME PARACHUTE SUPPLIER NAME: NORMAL
DME PARACHUTE SUPPLIER PHONE: NORMAL
ERYTHROCYTE [DISTWIDTH] IN BLOOD BY AUTOMATED COUNT: 13.4 % (ref 11.6–15.1)
GFR SERPL CREATININE-BSD FRML MDRD: 98 ML/MIN/1.73SQ M
GLUCOSE SERPL-MCNC: 159 MG/DL (ref 65–140)
HCT VFR BLD AUTO: 34.9 % (ref 34.8–46.1)
HGB BLD-MCNC: 11.4 G/DL (ref 11.5–15.4)
MCH RBC QN AUTO: 32.8 PG (ref 26.8–34.3)
MCHC RBC AUTO-ENTMCNC: 32.7 G/DL (ref 31.4–37.4)
MCV RBC AUTO: 100 FL (ref 82–98)
PLATELET # BLD AUTO: 246 THOUSANDS/UL (ref 149–390)
PMV BLD AUTO: 10.4 FL (ref 8.9–12.7)
POTASSIUM SERPL-SCNC: 3.9 MMOL/L (ref 3.5–5.3)
RBC # BLD AUTO: 3.48 MILLION/UL (ref 3.81–5.12)
SODIUM SERPL-SCNC: 140 MMOL/L (ref 135–147)
WBC # BLD AUTO: 12.67 THOUSAND/UL (ref 4.31–10.16)

## 2024-11-15 PROCEDURE — 99232 SBSQ HOSP IP/OBS MODERATE 35: CPT | Performed by: INTERNAL MEDICINE

## 2024-11-15 PROCEDURE — 85027 COMPLETE CBC AUTOMATED: CPT | Performed by: INTERNAL MEDICINE

## 2024-11-15 PROCEDURE — 94664 DEMO&/EVAL PT USE INHALER: CPT

## 2024-11-15 PROCEDURE — 99232 SBSQ HOSP IP/OBS MODERATE 35: CPT | Performed by: STUDENT IN AN ORGANIZED HEALTH CARE EDUCATION/TRAINING PROGRAM

## 2024-11-15 PROCEDURE — 94760 N-INVAS EAR/PLS OXIMETRY 1: CPT

## 2024-11-15 PROCEDURE — 80048 BASIC METABOLIC PNL TOTAL CA: CPT | Performed by: INTERNAL MEDICINE

## 2024-11-15 PROCEDURE — 94640 AIRWAY INHALATION TREATMENT: CPT

## 2024-11-15 PROCEDURE — 94668 MNPJ CHEST WALL SBSQ: CPT

## 2024-11-15 RX ORDER — METHYLPREDNISOLONE SODIUM SUCCINATE 40 MG/ML
40 INJECTION, POWDER, LYOPHILIZED, FOR SOLUTION INTRAMUSCULAR; INTRAVENOUS EVERY 12 HOURS SCHEDULED
Status: DISCONTINUED | OUTPATIENT
Start: 2024-11-15 | End: 2024-11-16

## 2024-11-15 RX ORDER — BUDESONIDE, GLYCOPYRROLATE, AND FORMOTEROL FUMARATE 160; 9; 4.8 UG/1; UG/1; UG/1
2 AEROSOL, METERED RESPIRATORY (INHALATION) DAILY
Qty: 10.7 G | Refills: 0 | Status: SHIPPED | OUTPATIENT
Start: 2024-11-15

## 2024-11-15 RX ORDER — FLUTICASONE FUROATE, UMECLIDINIUM BROMIDE AND VILANTEROL TRIFENATATE 200; 62.5; 25 UG/1; UG/1; UG/1
1 POWDER RESPIRATORY (INHALATION) DAILY
Qty: 60 BLISTER | Refills: 0 | Status: SHIPPED | OUTPATIENT
Start: 2024-11-15 | End: 2024-12-15

## 2024-11-15 RX ADMIN — METHYLPREDNISOLONE SODIUM SUCCINATE 40 MG: 40 INJECTION, POWDER, FOR SOLUTION INTRAMUSCULAR; INTRAVENOUS at 05:22

## 2024-11-15 RX ADMIN — IPRATROPIUM BROMIDE 0.5 MG: 0.5 SOLUTION RESPIRATORY (INHALATION) at 11:17

## 2024-11-15 RX ADMIN — LEVALBUTEROL HYDROCHLORIDE 1.25 MG: 1.25 SOLUTION RESPIRATORY (INHALATION) at 15:37

## 2024-11-15 RX ADMIN — IPRATROPIUM BROMIDE 0.5 MG: 0.5 SOLUTION RESPIRATORY (INHALATION) at 15:36

## 2024-11-15 RX ADMIN — THIAMINE HCL TAB 100 MG 100 MG: 100 TAB at 08:46

## 2024-11-15 RX ADMIN — IPRATROPIUM BROMIDE 0.5 MG: 0.5 SOLUTION RESPIRATORY (INHALATION) at 20:30

## 2024-11-15 RX ADMIN — METOPROLOL TARTRATE 50 MG: 50 TABLET, FILM COATED ORAL at 22:06

## 2024-11-15 RX ADMIN — NYSTATIN: 100000 POWDER TOPICAL at 08:51

## 2024-11-15 RX ADMIN — BUDESONIDE 0.5 MG: 0.5 INHALANT RESPIRATORY (INHALATION) at 20:29

## 2024-11-15 RX ADMIN — OLANZAPINE 10 MG: 2.5 TABLET, FILM COATED ORAL at 08:46

## 2024-11-15 RX ADMIN — ATORVASTATIN CALCIUM 40 MG: 40 TABLET, FILM COATED ORAL at 17:00

## 2024-11-15 RX ADMIN — Medication 1 TABLET: at 08:46

## 2024-11-15 RX ADMIN — FLUOXETINE HYDROCHLORIDE 20 MG: 20 CAPSULE ORAL at 08:47

## 2024-11-15 RX ADMIN — NICOTINE 1 PATCH: 7 PATCH, EXTENDED RELEASE TRANSDERMAL at 08:45

## 2024-11-15 RX ADMIN — LEVALBUTEROL HYDROCHLORIDE 1.25 MG: 1.25 SOLUTION RESPIRATORY (INHALATION) at 11:15

## 2024-11-15 RX ADMIN — APIXABAN 5 MG: 5 TABLET, FILM COATED ORAL at 08:45

## 2024-11-15 RX ADMIN — METOPROLOL TARTRATE 50 MG: 50 TABLET, FILM COATED ORAL at 08:46

## 2024-11-15 RX ADMIN — LISINOPRIL 20 MG: 20 TABLET ORAL at 08:46

## 2024-11-15 RX ADMIN — GUAIFENESIN 1200 MG: 600 TABLET, EXTENDED RELEASE ORAL at 08:45

## 2024-11-15 RX ADMIN — LEVALBUTEROL HYDROCHLORIDE 1.25 MG: 1.25 SOLUTION RESPIRATORY (INHALATION) at 07:30

## 2024-11-15 RX ADMIN — BUDESONIDE 0.5 MG: 0.5 INHALANT RESPIRATORY (INHALATION) at 07:30

## 2024-11-15 RX ADMIN — APIXABAN 5 MG: 5 TABLET, FILM COATED ORAL at 17:00

## 2024-11-15 RX ADMIN — METHYLPREDNISOLONE SODIUM SUCCINATE 40 MG: 40 INJECTION, POWDER, FOR SOLUTION INTRAMUSCULAR; INTRAVENOUS at 22:06

## 2024-11-15 RX ADMIN — FOLIC ACID 1 MG: 1 TABLET ORAL at 08:46

## 2024-11-15 RX ADMIN — ROPINIROLE 0.5 MG: 0.25 TABLET, FILM COATED ORAL at 22:06

## 2024-11-15 RX ADMIN — LEVALBUTEROL HYDROCHLORIDE 1.25 MG: 1.25 SOLUTION RESPIRATORY (INHALATION) at 20:30

## 2024-11-15 RX ADMIN — GUAIFENESIN 1200 MG: 600 TABLET, EXTENDED RELEASE ORAL at 17:00

## 2024-11-15 RX ADMIN — IPRATROPIUM BROMIDE 0.5 MG: 0.5 SOLUTION RESPIRATORY (INHALATION) at 07:30

## 2024-11-15 RX ADMIN — NYSTATIN: 100000 POWDER TOPICAL at 17:01

## 2024-11-15 NOTE — ASSESSMENT & PLAN NOTE
Currently on 2L NC, not on oxygen at home  In the setting of COPD exacerbation, see treatment above  Titrate oxygen to maintain O2 89% or greater  Home O2 study prior to discharge

## 2024-11-15 NOTE — PROGRESS NOTES
Progress Note - Cardiology   Name: Sophie Jamil 59 y.o. 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  continue Lopressor 50 mg BID  [id number] score = 2, or 2.2% risk of stroke per year, continue Eliquis 5 mg bid  discussed that anticoagulation will most likely be lifelong, question patient has significant others understanding due to low health literacy  overnight pulse oximetry to screen for sleep apnea performed on 2 L with AHI of one  discuss with patient would recommend short course of anticoagulation  encourage smoking and alcohol cessation  Chronic obstructive pulmonary disease with acute exacerbation (HCC)  long history of tobacco abuse  noted wheezing intermittently today  11/11/2024 CT chest PE protocol, negative for PE but presence of mild centrilobular emphysema noted  patient ordered for azithromycin, Xopenex and IV steroids per primary team  ETOH abuse  per patient she drinks approximately 40 ounces of beer daily and occasional wine  CIWA protocol per primary team  Diarrhea  per patient's sudden onset of diarrhea with over 10 episodes prior to be in seen at urgent care  no recent travel or antibiotic usage  no further diarrhea or vomiting since admission to hospital  11/11/2024 CT abdomen and pelvis: no acute inflammatory process, concern for hepatic steatosis  workup her primary team  Nicotine dependence  smokes approximately one half pack cigarettes per day for over 50 years  encourage smoking cessation  MDD (major depressive disorder)  patient does note she  does not really go outside due to her depression  currently on Prozac and Zyprexa    Subjective   Chief Complaint: Patient seen and examined, telemetry reviewed and patient continues to have intermittent rapid atrial fibrillation. This is most likely has been chronic as it is asymptomatic. Will continue rate control strategy with beta-blocker and factor X a inhibitor.      Objective :  Temp:  [97.9 °F (36.6 °C)-99.3 °F (37.4 °C)] 98 °F (36.7 °C)  HR:  [] 107  BP: (111-146)/() 146/109  Resp:  [16-20] 20  SpO2:  [90 %-97 %] 90 %  O2 Device: Nasal cannula  Nasal Cannula O2 Flow Rate (L/min):  [2 L/min] 2 L/min  Orthostatic Blood Pressures      Flowsheet Row Most Recent Value   Blood Pressure 146/109 filed at 11/15/2024 0842   Patient Position - Orthostatic VS Lying filed at 11/14/2024 2240          First Weight: Weight - Scale: 72.7 kg (160 lb 4.4 oz) (11/11/24 2322)  Vitals:    11/11/24 2322 11/12/24 0920   Weight: 72.7 kg (160 lb 4.4 oz) 72.6 kg (160 lb)     Physical Exam  Vitals and nursing note reviewed.   Constitutional:       Appearance: Normal appearance. She is obese. She is ill-appearing (Chronically).   HENT:      Right Ear: External ear normal.      Left Ear: External ear normal.   Eyes:      General: No scleral icterus.        Right eye: No discharge.         Left eye: No discharge.   Cardiovascular:      Rate and Rhythm: Normal rate. Rhythm irregular.      Pulses: Normal pulses.   Pulmonary:      Effort: Pulmonary effort is normal. No accessory muscle usage or respiratory distress.      Breath sounds: Examination of the right-lower field reveals decreased breath sounds. Examination of the left-lower field reveals decreased breath sounds. Decreased breath sounds, wheezing and rhonchi present.   Abdominal:      General: Bowel sounds are normal. There is no distension.      Palpations: Abdomen is soft.   Musculoskeletal:      Right lower leg: No edema.      Left lower leg: No edema.   Skin:      General: Skin is warm and dry.      Capillary Refill: Capillary refill takes less than 2 seconds.   Neurological:      General: No focal deficit present.      Mental Status: She is alert. Mental status is at baseline.   Psychiatric:         Mood and Affect: Mood normal.           Lab Results: I have reviewed the following results:  Results from last 7 days   Lab Units 11/15/24  0437 11/14/24  0540 11/13/24 0455   WBC Thousand/uL 12.67* 14.63* 15.66*   HEMOGLOBIN g/dL 11.4* 11.1* 11.2*   HEMATOCRIT % 34.9 34.0* 34.0*   PLATELETS Thousands/uL 246 245 250     Results from last 7 days   Lab Units 11/15/24  0437 11/14/24  0540 11/13/24 0455   POTASSIUM mmol/L 3.9 4.1 3.9   CHLORIDE mmol/L 106 109* 107   CO2 mmol/L 28 26 24   BUN mg/dL 14 10 5   CREATININE mg/dL 0.60 0.56* 0.59*   CALCIUM mg/dL 8.9 8.8 9.0         Lab Results   Component Value Date    HGBA1C 5.9 (H) 03/19/2024     Lab Results   Component Value Date    TROPONINI <0.02 02/14/2020       Telemetry: patient continues to have episodes of intermittent atrial fibrillation with sinus rhythm      VTE Pharmacologic Prophylaxis: VTE covered by:  apixaban, Oral, 5 mg at 11/15/24 0845     VTE Mechanical Prophylaxis: sequential compression device    Melissa DOUGHERTY  Cardiology

## 2024-11-15 NOTE — ASSESSMENT & PLAN NOTE
POA with shortness of breath and expiratory wheezing starting the morning of admission. Per patient ran out of home albuterol inhaler  D dimer elevated: 1.07  CTA chest: Negative for pulmonary embolism. Mild centrilobular emphysema. Left lower lobe calcified granuloma. Minimal scarring at the base of the lingula there is no tracheal or endobronchial lesion  Continue Xoponex and Atrovent nebulizers 4 times daily  Decrease IV Solumedrol 40mg BID  Plan to initiate on Trelegy and PRN albuterol at discharge. Will send Trelegy for price check  Completed 3 day course of azithromycin  Currently on 2L NC, titrate oxygen as able to maintain O2 89% or greater. Home O2 study prior to discharge  Respiratory protocol  Consult to Pulmonology, recommendations are appreciated

## 2024-11-15 NOTE — ASSESSMENT & PLAN NOTE
POA with shortness of breath, dizziness, nausea/vomiting and diarrhea that started this morning.  She went to urgent care and she was found that her heart rate 130-150 in the office and ECG revealed afib with RVR and send to the ED for further evaluation  In ED received 15 mg of IV Cardizem x 1 & repeat EKG showed that patient had converted to sinus rhythm  Suspect new onset A-fib secondary to electrolyte imbalance, diarrhea and acute COPD exacerbation  Troponins negative x 2  [id number] 2. Transitioned to Eliquis 5mg BID  Echo: The left ventricular ejection fraction is 60%. Systolic function is normal. Wall motion is normal. Diastolic function is normal.   Metoprolol tartrate 50mg BID for rate control  Monitor on telemetry  Optimize electrolytes  Consult to Cardiology, recommendations are appreciated. Outpatient follow up
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
## 2024-11-15 NOTE — PLAN OF CARE
Problem: PAIN - ADULT  Goal: Verbalizes/displays adequate comfort level or baseline comfort level  Description: Interventions:  - Encourage patient to monitor pain and request assistance  - Assess pain using appropriate pain scale  - Administer analgesics based on type and severity of pain and evaluate response  - Implement non-pharmacological measures as appropriate and evaluate response  - Consider cultural and social influences on pain and pain management  - Notify physician/advanced practitioner if interventions unsuccessful or patient reports new pain  11/15/2024 0946 by Cally Alarcon RN  Outcome: Progressing  11/15/2024 0946 by Cally Alarcon RN  Outcome: Progressing     Problem: INFECTION - ADULT  Goal: Absence or prevention of progression during hospitalization  Description: INTERVENTIONS:  - Assess and monitor for signs and symptoms of infection  - Monitor lab/diagnostic results  - Monitor all insertion sites, i.e. indwelling lines, tubes, and drains  - Monitor endotracheal if appropriate and nasal secretions for changes in amount and color  - Collins appropriate cooling/warming therapies per order  - Administer medications as ordered  - Instruct and encourage patient and family to use good hand hygiene technique  - Identify and instruct in appropriate isolation precautions for identified infection/condition  11/15/2024 0946 by Cally Alarcon RN  Outcome: Progressing  11/15/2024 0946 by Cally Alarcon RN  Outcome: Progressing  Goal: Absence of fever/infection during neutropenic period  Description: INTERVENTIONS:  - Monitor WBC    11/15/2024 0946 by Calyl Alarcon RN  Outcome: Progressing  11/15/2024 0946 by Cally Alarcon RN  Outcome: Progressing     Problem: SAFETY ADULT  Goal: Patient will remain free of falls  Description: INTERVENTIONS:  - Educate patient/family on patient safety including physical limitations  - Instruct patient to call for assistance with activity   - Consult OT/PT to  assist with strengthening/mobility   - Keep Call bell within reach  - Keep bed low and locked with side rails adjusted as appropriate  - Keep care items and personal belongings within reach  - Initiate and maintain comfort rounds  - Make Fall Risk Sign visible to staff  - Offer Toileting every 2 Hours, in advance of need  - Initiate/Maintain bed alarm  - Obtain necessary fall risk management equipment: slipper socks  - Apply yellow socks and bracelet for high fall risk patients  - Consider moving patient to room near nurses station  11/15/2024 0946 by Cally Alarcon RN  Outcome: Progressing  11/15/2024 0946 by Cally Alarcon RN  Outcome: Progressing  Goal: Maintain or return to baseline ADL function  Description: INTERVENTIONS:  -  Assess patient's ability to carry out ADLs; assess patient's baseline for ADL function and identify physical deficits which impact ability to perform ADLs (bathing, care of mouth/teeth, toileting, grooming, dressing, etc.)  - Assess/evaluate cause of self-care deficits   - Assess range of motion  - Assess patient's mobility; develop plan if impaired  - Assess patient's need for assistive devices and provide as appropriate  - Encourage maximum independence but intervene and supervise when necessary  - Involve family in performance of ADLs  - Assess for home care needs following discharge   - Consider OT consult to assist with ADL evaluation and planning for discharge  - Provide patient education as appropriate  11/15/2024 0946 by Cally Alarcon RN  Outcome: Progressing  11/15/2024 0946 by Cally Alarcon RN  Outcome: Progressing  Goal: Maintains/Returns to pre admission functional level  Description: INTERVENTIONS:  - Perform AM-PAC 6 Click Basic Mobility/ Daily Activity assessment daily.  - Set and communicate daily mobility goal to care team and patient/family/caregiver.   - Collaborate with rehabilitation services on mobility goals if consulted  - Perform Range of Motion 3 times a  day.  - Reposition patient every 2 hours.  - Dangle patient 3 times a day  - Stand patient 3 times a day  - Ambulate patient 3 times a day  - Out of bed to chair 3 times a day   - Out of bed for meals 3 times a day  - Out of bed for toileting  - Record patient progress and toleration of activity level   11/15/2024 0946 by Cally Alarcon RN  Outcome: Progressing  11/15/2024 0946 by Cally Alarcon RN  Outcome: Progressing     Problem: DISCHARGE PLANNING  Goal: Discharge to home or other facility with appropriate resources  Description: INTERVENTIONS:  - Identify barriers to discharge w/patient and caregiver  - Arrange for needed discharge resources and transportation as appropriate  - Identify discharge learning needs (meds, wound care, etc.)  - Arrange for interpretive services to assist at discharge as needed  - Refer to Case Management Department for coordinating discharge planning if the patient needs post-hospital services based on physician/advanced practitioner order or complex needs related to functional status, cognitive ability, or social support system  11/15/2024 0946 by Cally Alarcon RN  Outcome: Progressing  11/15/2024 0946 by Cally Alarcon RN  Outcome: Progressing     Problem: Knowledge Deficit  Goal: Patient/family/caregiver demonstrates understanding of disease process, treatment plan, medications, and discharge instructions  Description: Complete learning assessment and assess knowledge base.  Interventions:  - Provide teaching at level of understanding  - Provide teaching via preferred learning methods  11/15/2024 0946 by Cally Alarcon RN  Outcome: Progressing  11/15/2024 0946 by Cally Alarcon RN  Outcome: Progressing     Problem: GASTROINTESTINAL - ADULT  Goal: Minimal or absence of nausea and/or vomiting  Description: INTERVENTIONS:  - Administer IV fluids if ordered to ensure adequate hydration  - Maintain NPO status until nausea and vomiting are resolved  - Nasogastric tube if  ordered  - Administer ordered antiemetic medications as needed  - Provide nonpharmacologic comfort measures as appropriate  - Advance diet as tolerated, if ordered  - Consider nutrition services referral to assist patient with adequate nutrition and appropriate food choices  11/15/2024 0946 by Cally Alarcon RN  Outcome: Progressing  11/15/2024 0946 by Cally Alarcon RN  Outcome: Progressing  Goal: Maintains or returns to baseline bowel function  Description: INTERVENTIONS:  - Assess bowel function  - Encourage oral fluids to ensure adequate hydration  - Administer IV fluids if ordered to ensure adequate hydration  - Administer ordered medications as needed  - Encourage mobilization and activity  - Consider nutritional services referral to assist patient with adequate nutrition and appropriate food choices  11/15/2024 0946 by Cally Alarcon RN  Outcome: Progressing  11/15/2024 0946 by Cally Alarcon RN  Outcome: Progressing  Goal: Maintains adequate nutritional intake  Description: INTERVENTIONS:  - Monitor percentage of each meal consumed  - Identify factors contributing to decreased intake, treat as appropriate  - Assist with meals as needed  - Monitor I&O, weight, and lab values if indicated  - Obtain nutrition services referral as needed  11/15/2024 0946 by Cally Alarcon RN  Outcome: Progressing  11/15/2024 0946 by Cally Alarcon RN  Outcome: Progressing  Goal: Establish and maintain optimal ostomy function  Description: INTERVENTIONS:  - Assess bowel function  - Encourage oral fluids to ensure adequate hydration  - Administer IV fluids if ordered to ensure adequate hydration   - Administer ordered medications as needed  - Encourage mobilization and activity  - Nutrition services referral to assist patient with appropriate food choices  - Assess stoma site  - Consider wound care consult   11/15/2024 0946 by Cally Alarcon RN  Outcome: Progressing  11/15/2024 0946 by Cally Alarcno RN  Outcome:  Progressing  Goal: Oral mucous membranes remain intact  Description: INTERVENTIONS  - Assess oral mucosa and hygiene practices  - Implement preventative oral hygiene regimen  - Implement oral medicated treatments as ordered  - Initiate Nutrition services referral as needed  11/15/2024 0946 by Cally Alarcon RN  Outcome: Progressing  11/15/2024 0946 by Cally Alarcon RN  Outcome: Progressing     Problem: RESPIRATORY - ADULT  Goal: Achieves optimal ventilation and oxygenation  Description: INTERVENTIONS:  - Assess for changes in respiratory status  - Assess for changes in mentation and behavior  - Position to facilitate oxygenation and minimize respiratory effort  - Oxygen administered by appropriate delivery if ordered  - Initiate smoking cessation education as indicated  - Encourage broncho-pulmonary hygiene including cough, deep breathe, Incentive Spirometry  - Assess the need for suctioning and aspirate as needed  - Assess and instruct to report SOB or any respiratory difficulty  - Respiratory Therapy support as indicated  11/15/2024 0946 by Cally Alarcon RN  Outcome: Progressing  11/15/2024 0946 by Cally Alarcon RN  Outcome: Progressing     Problem: Prexisting or High Potential for Compromised Skin Integrity  Goal: Skin integrity is maintained or improved  Description: INTERVENTIONS:  - Identify patients at risk for skin breakdown  - Assess and monitor skin integrity  - Assess and monitor nutrition and hydration status  - Monitor labs   - Assess for incontinence   - Turn and reposition patient  - Assist with mobility/ambulation  - Relieve pressure over bony prominences  - Avoid friction and shearing  - Provide appropriate hygiene as needed including keeping skin clean and dry  - Evaluate need for skin moisturizer/barrier cream  - Collaborate with interdisciplinary team   - Patient/family teaching  - Consider wound care consult   11/15/2024 0946 by Cally Alarcon RN  Outcome: Progressing  11/15/2024 0946 by  Cally Alarcon RN  Outcome: Progressing

## 2024-11-15 NOTE — ASSESSMENT & PLAN NOTE
Home regimen is albuterol 4 times daily.  Continue Solu-Medrol 40 mg IV, but decrease it to twice daily dosing today and likely can switch to oral steroids tomorrow if continues to improve.  Would recommend prednisone 40 mg daily with taper by 10 mg every 3 days as tolerated pending course.  Complete 3 days of azithromycin.  Continue Xopenex/ipratropium 4 times daily and budesonide twice daily while inpatient.  At discharge, recommend initiation of ICS/LABA/LAMA (Trelegy or Breztri) with albuterol as needed.  She would benefit from having a nebulizer at home.

## 2024-11-15 NOTE — PROGRESS NOTES
Progress Note - Pulmonology   Name: Sophie Jamil 59 y.o. female I MRN: [id number]  Unit/Bed#: [address] [place] [address] I Date of Admission: 11/11/2024   Date of Service: 11/15/2024 I Hospital Day: 4    Assessment & Plan  New onset a-fib (HCC)  Management per cardiology.  Nicotine dependence  Has smoked 4 cigarettes daily for the last 1 year, but was previously a pack a day smoker for at least 40 years.  Recommend complete cessation.  NRT per primary team.  Chronic obstructive pulmonary disease with acute exacerbation (HCC)  Home regimen is albuterol 4 times daily.  Continue Solu-Medrol 40 mg IV, but decrease it to twice daily dosing today and likely can switch to oral steroids tomorrow if continues to improve.  Would recommend prednisone 40 mg daily with taper by 10 mg every 3 days as tolerated pending course.  Complete 3 days of azithromycin.  Continue Xopenex/ipratropium 4 times daily and budesonide twice daily while inpatient.  At discharge, recommend initiation of ICS/LABA/LAMA (Trelegy or Breztri) with albuterol as needed.  She would benefit from having a nebulizer at home.  Acute respiratory failure with hypoxia (HCC)  Did have desaturation to 88% this admission.  Currently saturating 96% on 2 L nasal cannula.  Supplemental oxygen to maintain saturations greater than or equal to 89%.  Activity as tolerated.  Ambulatory pulse ox prior to discharge.    Outpatient follow-up as per discharge instructions.  Discussed with primary team.    24 Hour Events : Feeling better  Subjective : Sophie is sitting up in bed.  She reports she is feeling better.  She continues to have some wheezing, but overall improved.  She was walking in the room yesterday doing well.    Objective :  Temp:  [97.9 °F (36.6 °C)-98.4 °F (36.9 °C)] 98 °F (36.7 °C)  HR:  [] 107  BP: (111-146)/() 146/109  Resp:  [16-20] 20  SpO2:  [90 %-99 %] 99 %  O2 Device: Nasal cannula  Nasal Cannula O2 Flow Rate (L/min):  [2 L/min] 2 L/min    Physical  Exam  Vitals reviewed.   Constitutional:       General: She is not in acute distress.     Appearance: She is well-developed. She is obese. She is not toxic-appearing or diaphoretic.      Interventions: Nasal cannula in place.   HENT:      Head: Normocephalic and atraumatic.   Eyes:      General: No scleral icterus.     Extraocular Movements: Extraocular movements intact.   Neck:      Trachea: No tracheal deviation.   Cardiovascular:      Rate and Rhythm: Normal rate and regular rhythm.      Heart sounds: S1 normal and S2 normal. No murmur heard.     No friction rub. No gallop.   Pulmonary:      Effort: Pulmonary effort is normal. No tachypnea, accessory muscle usage or respiratory distress.      Breath sounds: No stridor. Wheezing present. No decreased breath sounds, rhonchi or rales.   Chest:      Chest wall: No tenderness.   Abdominal:      General: Bowel sounds are normal. There is no distension.      Palpations: Abdomen is soft.      Tenderness: There is no abdominal tenderness.   Musculoskeletal:      Cervical back: Neck supple.      Right lower leg: No edema.      Left lower leg: No edema.   Skin:     General: Skin is warm and dry.      Findings: No rash.   Neurological:      Mental Status: She is alert and oriented to person, place, and time.      GCS: GCS eye subscore is 4. GCS verbal subscore is 5. GCS motor subscore is 6.   Psychiatric:         Speech: Speech normal.         Behavior: Behavior normal. Behavior is cooperative.         Lab Results: I have reviewed the following results:   .     11/15/24  0437   WBC 12.67*   HGB 11.4*   HCT 34.9      SODIUM 140   K 3.9      CO2 28   BUN 14   CREATININE 0.60   GLUC 159*

## 2024-11-15 NOTE — ASSESSMENT & PLAN NOTE
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

## 2024-11-15 NOTE — PROGRESS NOTES
Progress Note - Hospitalist   Name: Sophie Jamil 59 y.o. female I MRN: [id number]  Unit/Bed#: [address] [address] I Date of Admission: 11/11/2024   Date of Service: 11/15/2024 I Hospital Day: 4    Assessment & Plan  New onset a-fib (HCC)  POA with shortness of breath, dizziness, nausea/vomiting and diarrhea that started this morning.  She went to urgent care and she was found that her heart rate 130-150 in the office and ECG revealed afib with RVR and send to the ED for further evaluation  In ED received 15 mg of IV Cardizem x 1 & repeat EKG showed that patient had converted to sinus rhythm  Suspect new onset A-fib secondary to electrolyte imbalance, diarrhea and acute COPD exacerbation  Troponins negative x 2  [id number] 2. Transitioned to Eliquis 5mg BID  Echo: The left ventricular ejection fraction is 60%. Systolic function is normal. Wall motion is normal. Diastolic function is normal.   Metoprolol tartrate 50mg BID for rate control  Monitor on telemetry  Optimize electrolytes  Consult to Cardiology, recommendations are appreciated. Outpatient follow up  Chronic obstructive pulmonary disease with acute exacerbation (HCC)  POA with shortness of breath and expiratory wheezing starting the morning of admission. Per patient ran out of home albuterol inhaler  D dimer elevated: 1.07  CTA chest: Negative for pulmonary embolism. Mild centrilobular emphysema. Left lower lobe calcified granuloma. Minimal scarring at the base of the lingula there is no tracheal or endobronchial lesion  Continue Xoponex and Atrovent nebulizers 4 times daily  Decrease IV Solumedrol 40mg BID  Plan to initiate on Trelegy and PRN albuterol at discharge. Will send Trelegy for price check  Completed 3 day course of azithromycin  Currently on 2L NC, titrate oxygen as able to maintain O2 89% or greater. Home O2 study prior to discharge  Respiratory protocol  Consult to Pulmonology, recommendations are appreciated  Diarrhea  POA with over 10 episodes of  diarrhea and 5 episodes of vomiting that started the morning of admission  Denies any recent antibiotic usage or travel. Denies abdominal pain,denies fever, or chills.  In ED, received 1 dose of Imodium  Noted afebrile, leukocytosis WBC 14.64 , procal negative  CT A/P wcontrast: Negative for acute pathology. Hepatic steatosis. Stable 3.4 cm left adrenal gland nodule.no diverticulitis noted  Cdiff PCR negative. Bacterial enteric panel could not be collected, will discontinue  Hold off further doses of imodium  Monitor off antibiotics  Patient reports no further diarrhea since admission  Nicotine dependence  Smokes 0.5 PPD for greater than 50 years  Continue nicotine patch  Smoking cessation provided     ETOH abuse  Per patient drinks 40 oz beer daily. Per patient last drink was prior to ED arrival  Denies w/d seizures  Not in acute withdrawal  Ethanol level negative  Continue CIWA protocol   Initiate multivitamin,  thiamine and folic acid  Complete alcohol cessation discussed with patient  MDD (major depressive disorder)  Mood stable  Continue home Prozac and Zyprexa    Acute respiratory failure with hypoxia (HCC)  Currently on 2L NC, not on oxygen at home  In the setting of COPD exacerbation, see treatment above  Titrate oxygen to maintain O2 89% or greater  Home O2 study prior to discharge    VTE Pharmacologic Prophylaxis:   Moderate Risk (Score 3-4) - Pharmacological DVT Prophylaxis Ordered: apixaban (Eliquis).    Mobility:   Basic Mobility Inpatient Raw Score: 17  JH-HLM Goal: 5: Stand one or more mins  JH-HLM Achieved: 6: Walk 10 steps or more  JH-HLM Goal achieved. Continue to encourage appropriate mobility.    Patient Centered Rounds: I performed bedside rounds with nursing staff today.   Discussions with Specialists or Other Care Team Provider: Nursing, Pulmonology, Cardiology    Education and Discussions with Family / Patient: Updated  (significant other) at bedside.    Current Length of Stay:  4 day(s)  Current Patient Status: Inpatient   Certification Statement: The patient will continue to require additional inpatient hospital stay due to COPD exacerbation  Discharge Plan: Anticipate discharge in 24-48 hrs to home.    Code Status: Level 1 - Full Code    Subjective   Patient sitting upright in bed. Reports feeling better today. Still with wheezing but improved. Denies any chest pain or palpitations    Objective :  Temp:  [97.9 °F (36.6 °C)-98.4 °F (36.9 °C)] 98 °F (36.7 °C)  HR:  [] 107  BP: (111-146)/() 146/109  Resp:  [16-20] 20  SpO2:  [90 %-97 %] 90 %  O2 Device: Nasal cannula  Nasal Cannula O2 Flow Rate (L/min):  [2 L/min] 2 L/min    Body mass index is 31.25 kg/m².     Input and Output Summary (last 24 hours):     Intake/Output Summary (Last 24 hours) at 11/15/2024 1050  Last data filed at 11/14/2024 2240  Gross per 24 hour   Intake 660 ml   Output 450 ml   Net 210 ml       Physical Exam  Vitals and nursing note reviewed.   Constitutional:       General: She is not in acute distress.     Appearance: She is well-developed. She is ill-appearing.   HENT:      Head: Normocephalic and atraumatic.   Eyes:      Conjunctiva/sclera: Conjunctivae normal.   Cardiovascular:      Rate and Rhythm: Normal rate. Rhythm irregular.      Heart sounds: No murmur heard.  Pulmonary:      Effort: Pulmonary effort is normal. No respiratory distress.      Breath sounds: Wheezing present.   Abdominal:      Palpations: Abdomen is soft.      Tenderness: There is no abdominal tenderness.   Musculoskeletal:         General: No swelling.      Cervical back: Neck supple.   Skin:     General: Skin is warm and dry.      Capillary Refill: Capillary refill takes less than 2 seconds.   Neurological:      Mental Status: She is alert.   Psychiatric:         Mood and Affect: Mood normal.       Lines/Drains:        Telemetry:  Telemetry Orders (From admission, onward)               24 Hour Telemetry Monitoring  Continuous x 24  Hours (Telem)        Question:  Reason for 24 Hour Telemetry  Answer:  Decompensated CHF- and any one of the following: continuous diuretic infusion or total diuretic dose >200 mg daily, associated electrolyte derangement (I.e. K < 3.0), ionotropic drip (continuous infusion), hx of ventricular arrhythmia, or new EF < 35%                     Telemetry Reviewed: Atrial fibrillation. HR averaging 100  Indication for Continued Telemetry Use: Arrthymias requiring medical therapy               Lab Results: I have reviewed the following results:   Results from last 7 days   Lab Units 11/15/24  0437 11/13/24  0455 11/12/24  0450   WBC Thousand/uL 12.67*   < > 11.85*   HEMOGLOBIN g/dL 11.4*   < > 13.0   HEMATOCRIT % 34.9   < > 38.0   PLATELETS Thousands/uL 246   < > 266   SEGS PCT %  --   --  95*   LYMPHO PCT %  --   --  4*   MONO PCT %  --   --  1*   EOS PCT %  --   --  0    < > = values in this interval not displayed.     Results from last 7 days   Lab Units 11/15/24  0437 11/12/24  0450 11/11/24  1705   SODIUM mmol/L 140   < > 128*   POTASSIUM mmol/L 3.9   < > 3.8   CHLORIDE mmol/L 106   < > 96   CO2 mmol/L 28   < > 14*   BUN mg/dL 14   < > 7   CREATININE mg/dL 0.60   < > 0.79   ANION GAP mmol/L 6   < > 18*   CALCIUM mg/dL 8.9   < > 8.7   ALBUMIN g/dL  --   --  4.2   TOTAL BILIRUBIN mg/dL  --   --  0.75   ALK PHOS U/L  --   --  102   ALT U/L  --   --  81*   AST U/L  --   --  153*   GLUCOSE RANDOM mg/dL 159*   < > 145*    < > = values in this interval not displayed.                 Results from last 7 days   Lab Units 11/11/24  2245   PROCALCITONIN ng/ml 0.08       Recent Cultures (last 7 days):   Results from last 7 days   Lab Units 11/11/24  2238   C DIFF TOXIN B BY PCR  Negative       Imaging Results Review: No pertinent imaging studies reviewed.  Other Study Results Review: No additional pertinent studies reviewed.    Last 24 Hours Medication List:     Current Facility-Administered Medications:     acetaminophen  (TYLENOL) tablet 650 mg, Q6H PRN    apixaban (ELIQUIS) tablet 5 mg, BID    atorvastatin (LIPITOR) tablet 40 mg, Daily With Dinner    budesonide (PULMICORT) inhalation solution 0.5 mg, Q12H    diltiazem (CARDIZEM) injection 15 mg, Once    FLUoxetine (PROzac) capsule 20 mg, Daily    folic acid (FOLVITE) tablet 1 mg, Daily    guaiFENesin (MUCINEX) 12 hr tablet 1,200 mg, BID    ipratropium (ATROVENT) 0.02 % inhalation solution 0.5 mg, 4x Daily    ipratropium (ATROVENT) 0.02 % inhalation solution 0.5 mg, Q6H PRN    levalbuterol (XOPENEX) inhalation solution 1.25 mg, Q6H PRN **AND** sodium chloride 0.9 % inhalation solution 3 mL, Q6H PRN    levalbuterol (XOPENEX) inhalation solution 1.25 mg, 4x Daily    lisinopril (ZESTRIL) tablet 20 mg, Daily    methylPREDNISolone sodium succinate (Solu-MEDROL) injection 40 mg, Q12H HENRY    metoprolol tartrate (LOPRESSOR) tablet 50 mg, Q12H HENRY    multivitamin-minerals (CENTRUM) tablet 1 tablet, Daily    nicotine (NICODERM CQ) 7 mg/24hr TD 24 hr patch 1 patch, Daily    nystatin (MYCOSTATIN) powder, BID    OLANZapine (ZyPREXA) tablet 10 mg, QAM    ondansetron (ZOFRAN) injection 4 mg, Q6H PRN    rOPINIRole (REQUIP) tablet 0.5 mg, HS    thiamine tablet 100 mg, Daily    Administrative Statements   Today, Patient Was Seen By: Kerri Pena PA-C  I have spent a total time of 30 minutes in caring for this patient on the day of the visit/encounter including Diagnostic results, Risks and benefits of tx options, Instructions for management, Patient and family education, Counseling / Coordination of care, Documenting in the medical record, Reviewing / ordering tests, medicine, procedures  , Obtaining or reviewing history  , and Communicating with other healthcare professionals .    **Please Note: This note may have been constructed using a voice recognition system.**

## 2024-11-15 NOTE — ASSESSMENT & PLAN NOTE
POA with over 10 episodes of diarrhea and 5 episodes of vomiting that started the morning of admission  Denies any recent antibiotic usage or travel. Denies abdominal pain,denies fever, or chills.  In ED, received 1 dose of Imodium  Noted afebrile, leukocytosis WBC 14.64 , procal negative  CT A/P wcontrast: Negative for acute pathology. Hepatic steatosis. Stable 3.4 cm left adrenal gland nodule.no diverticulitis noted  Cdiff PCR negative. Bacterial enteric panel could not be collected, will discontinue  Hold off further doses of imodium  Monitor off antibiotics  Patient reports no further diarrhea since admission

## 2024-11-15 NOTE — CASE MANAGEMENT
Case Management Discharge Planning Note    Patient name Sophie Jamil  Location [address] [place] [address]/[address] [place] [address]-* MRN [id number]  : 1965 Date 11/15/2024       Current Admission Date: 2024  Current Admission Diagnosis:New onset a-fib (HCC)   Patient Active Problem List    Diagnosis Date Noted Date Diagnosed    Acute respiratory failure with hypoxia (HCC) 2024     New onset a-fib (HCC) 2024     Diarrhea 2024     Hyponatremia 2024     SIRS (systemic inflammatory response syndrome) (HCC) 2024     Hypomagnesemia 2024     increased anion gap (IAG) 2024     Elevated LFTs 2022     Recurrent major depressive disorder (HCC) 2021     Essential hypertension 09/15/2021     Epidermal inclusion cyst 2021     Mixed hyperlipidemia 2021     Vitamin D deficiency 2021     Insomnia 2021     ETOH abuse 2021     Anxiety 2021     Restless leg syndrome 2021     MDD (major depressive disorder) 2020     Chronic obstructive pulmonary disease with acute exacerbation (HCC)      Reactive airway disease 2016     Nicotine dependence 2016     Adrenal incidentaloma, Left 2016       LOS (days): 4  Geometric Mean LOS (GMLOS) (days): 2.7  Days to GMLOS:-1     OBJECTIVE:  Risk of Unplanned Readmission Score: 15.92         Current admission status: Inpatient   Preferred Pharmacy:   [facility] [address] [address]  Phone: [phone] Fax: [phone]    Primary Care Provider: Jose Briceno MD    Primary Insurance: [employer] [facility]  Secondary Insurance:     DISCHARGE DETAILS:       DME Referral Provided  Referral made for DME?: Yes (Nebulizer ordered and approved with no co-pay.  SW delivered nebulizer to bedside and signed delivery ticket was uploaded to [employer].  Original placed in [employer] liaison's inbox.)  DME referral completed for the   note:  Received a call from APRN stating that pt will need to go to SNF.   Attempted to call daughter but no answer, LVM.   Per CDU charge nurse, there is noone at the bedside.   Will wait for daughter to call CM back.   following items:: Nebulizer  DME Supplier Name:: [employer]    Other Referral/Resources/Interventions Provided:  Interventions: HHC, DME  Programs:: COPD    Would you like to participate in our [facility] service program?  : No - Declined    Treatment Team Recommendation: Home with Home Health Care  Discharge Destination Plan:: Home with Home Health Care  Transport at Discharge : Ride Share, Wheelchair van (pending progress)

## 2024-11-15 NOTE — ASSESSMENT & PLAN NOTE
Did have desaturation to 88% this admission.  Currently saturating 96% on 2 L nasal cannula.  Supplemental oxygen to maintain saturations greater than or equal to 89%.  Activity as tolerated.  Ambulatory pulse ox prior to discharge.

## 2024-11-16 VITALS
WEIGHT: 160 LBS | TEMPERATURE: 98.3 F | HEIGHT: 60 IN | HEART RATE: 58 BPM | OXYGEN SATURATION: 97 % | RESPIRATION RATE: 18 BRPM | BODY MASS INDEX: 31.41 KG/M2 | SYSTOLIC BLOOD PRESSURE: 124 MMHG | DIASTOLIC BLOOD PRESSURE: 82 MMHG

## 2024-11-16 LAB
ANION GAP SERPL CALCULATED.3IONS-SCNC: 6 MMOL/L (ref 4–13)
BUN SERPL-MCNC: 15 MG/DL (ref 5–25)
CALCIUM SERPL-MCNC: 8.6 MG/DL (ref 8.4–10.2)
CHLORIDE SERPL-SCNC: 104 MMOL/L (ref 96–108)
CO2 SERPL-SCNC: 28 MMOL/L (ref 21–32)
CREAT SERPL-MCNC: 0.61 MG/DL (ref 0.6–1.3)
ERYTHROCYTE [DISTWIDTH] IN BLOOD BY AUTOMATED COUNT: 13.3 % (ref 11.6–15.1)
GFR SERPL CREATININE-BSD FRML MDRD: 98 ML/MIN/1.73SQ M
GLUCOSE SERPL-MCNC: 156 MG/DL (ref 65–140)
HCT VFR BLD AUTO: 34.8 % (ref 34.8–46.1)
HGB BLD-MCNC: 11.3 G/DL (ref 11.5–15.4)
MCH RBC QN AUTO: 32.6 PG (ref 26.8–34.3)
MCHC RBC AUTO-ENTMCNC: 32.5 G/DL (ref 31.4–37.4)
MCV RBC AUTO: 100 FL (ref 82–98)
PLATELET # BLD AUTO: 244 THOUSANDS/UL (ref 149–390)
PMV BLD AUTO: 10.7 FL (ref 8.9–12.7)
POTASSIUM SERPL-SCNC: 3.9 MMOL/L (ref 3.5–5.3)
RBC # BLD AUTO: 3.47 MILLION/UL (ref 3.81–5.12)
SODIUM SERPL-SCNC: 138 MMOL/L (ref 135–147)
WBC # BLD AUTO: 13.31 THOUSAND/UL (ref 4.31–10.16)

## 2024-11-16 PROCEDURE — 80048 BASIC METABOLIC PNL TOTAL CA: CPT | Performed by: INTERNAL MEDICINE

## 2024-11-16 PROCEDURE — 94761 N-INVAS EAR/PLS OXIMETRY MLT: CPT

## 2024-11-16 PROCEDURE — 94760 N-INVAS EAR/PLS OXIMETRY 1: CPT

## 2024-11-16 PROCEDURE — 85027 COMPLETE CBC AUTOMATED: CPT | Performed by: INTERNAL MEDICINE

## 2024-11-16 PROCEDURE — 99239 HOSP IP/OBS DSCHRG MGMT >30: CPT | Performed by: INTERNAL MEDICINE

## 2024-11-16 PROCEDURE — 94640 AIRWAY INHALATION TREATMENT: CPT

## 2024-11-16 RX ORDER — METOPROLOL TARTRATE 50 MG
50 TABLET ORAL EVERY 12 HOURS SCHEDULED
Qty: 60 TABLET | Refills: 0 | Status: SHIPPED | OUTPATIENT
Start: 2024-11-16

## 2024-11-16 RX ORDER — ALBUTEROL SULFATE 90 UG/1
2 INHALANT RESPIRATORY (INHALATION) EVERY 6 HOURS PRN
Qty: 18 G | Refills: 0 | Status: SHIPPED | OUTPATIENT
Start: 2024-11-16 | End: 2024-11-16

## 2024-11-16 RX ORDER — PREDNISONE 10 MG/1
TABLET ORAL
Qty: 50 TABLET | Refills: 0 | Status: SHIPPED | OUTPATIENT
Start: 2024-11-17 | End: 2024-12-07

## 2024-11-16 RX ORDER — ALBUTEROL SULFATE 90 UG/1
2 INHALANT RESPIRATORY (INHALATION) EVERY 6 HOURS PRN
Qty: 18 G | Refills: 0 | Status: SHIPPED | OUTPATIENT
Start: 2024-11-16

## 2024-11-16 RX ORDER — METOPROLOL TARTRATE 50 MG
50 TABLET ORAL EVERY 12 HOURS SCHEDULED
Qty: 60 TABLET | Refills: 0 | Status: SHIPPED | OUTPATIENT
Start: 2024-11-16 | End: 2024-11-16

## 2024-11-16 RX ORDER — BUDESONIDE AND FORMOTEROL FUMARATE DIHYDRATE 80; 4.5 UG/1; UG/1
2 AEROSOL RESPIRATORY (INHALATION) 2 TIMES DAILY
Status: DISCONTINUED | OUTPATIENT
Start: 2024-11-16 | End: 2024-11-16 | Stop reason: HOSPADM

## 2024-11-16 RX ORDER — ALBUTEROL SULFATE 90 UG/1
2 INHALANT RESPIRATORY (INHALATION) EVERY 6 HOURS PRN
Qty: 6.7 G | Refills: 0 | Status: SHIPPED | OUTPATIENT
Start: 2024-11-16

## 2024-11-16 RX ORDER — PREDNISONE 10 MG/1
10 TABLET ORAL DAILY
Status: DISCONTINUED | OUTPATIENT
Start: 2024-12-02 | End: 2024-11-16 | Stop reason: HOSPADM

## 2024-11-16 RX ORDER — PREDNISONE 20 MG/1
40 TABLET ORAL DAILY
Status: DISCONTINUED | OUTPATIENT
Start: 2024-11-17 | End: 2024-11-16 | Stop reason: HOSPADM

## 2024-11-16 RX ORDER — PREDNISONE 10 MG/1
TABLET ORAL
Qty: 50 TABLET | Refills: 0 | Status: SHIPPED | OUTPATIENT
Start: 2024-11-17 | End: 2024-11-16

## 2024-11-16 RX ORDER — IPRATROPIUM BROMIDE AND ALBUTEROL SULFATE 2.5; .5 MG/3ML; MG/3ML
3 SOLUTION RESPIRATORY (INHALATION) 4 TIMES DAILY
Qty: 360 ML | Refills: 0 | Status: SHIPPED | OUTPATIENT
Start: 2024-11-16

## 2024-11-16 RX ORDER — IPRATROPIUM BROMIDE AND ALBUTEROL SULFATE 2.5; .5 MG/3ML; MG/3ML
3 SOLUTION RESPIRATORY (INHALATION) 4 TIMES DAILY
Qty: 360 ML | Refills: 0 | Status: SHIPPED | OUTPATIENT
Start: 2024-11-16 | End: 2024-11-16

## 2024-11-16 RX ORDER — PREDNISONE 20 MG/1
20 TABLET ORAL DAILY
Status: DISCONTINUED | OUTPATIENT
Start: 2024-11-27 | End: 2024-11-16 | Stop reason: HOSPADM

## 2024-11-16 RX ADMIN — BUDESONIDE 0.5 MG: 0.5 INHALANT RESPIRATORY (INHALATION) at 07:20

## 2024-11-16 RX ADMIN — NICOTINE 1 PATCH: 7 PATCH, EXTENDED RELEASE TRANSDERMAL at 10:03

## 2024-11-16 RX ADMIN — METOPROLOL TARTRATE 50 MG: 50 TABLET, FILM COATED ORAL at 10:02

## 2024-11-16 RX ADMIN — FOLIC ACID 1 MG: 1 TABLET ORAL at 10:02

## 2024-11-16 RX ADMIN — NYSTATIN: 100000 POWDER TOPICAL at 10:06

## 2024-11-16 RX ADMIN — GUAIFENESIN 1200 MG: 600 TABLET, EXTENDED RELEASE ORAL at 10:02

## 2024-11-16 RX ADMIN — OLANZAPINE 10 MG: 2.5 TABLET, FILM COATED ORAL at 10:02

## 2024-11-16 RX ADMIN — FLUOXETINE HYDROCHLORIDE 20 MG: 20 CAPSULE ORAL at 10:05

## 2024-11-16 RX ADMIN — LEVALBUTEROL HYDROCHLORIDE 1.25 MG: 1.25 SOLUTION RESPIRATORY (INHALATION) at 12:49

## 2024-11-16 RX ADMIN — APIXABAN 5 MG: 5 TABLET, FILM COATED ORAL at 10:02

## 2024-11-16 RX ADMIN — IPRATROPIUM BROMIDE 0.5 MG: 0.5 SOLUTION RESPIRATORY (INHALATION) at 07:20

## 2024-11-16 RX ADMIN — LISINOPRIL 20 MG: 20 TABLET ORAL at 10:02

## 2024-11-16 RX ADMIN — METHYLPREDNISOLONE SODIUM SUCCINATE 40 MG: 40 INJECTION, POWDER, FOR SOLUTION INTRAMUSCULAR; INTRAVENOUS at 10:03

## 2024-11-16 RX ADMIN — Medication 1 TABLET: at 10:02

## 2024-11-16 RX ADMIN — THIAMINE HCL TAB 100 MG 100 MG: 100 TAB at 10:02

## 2024-11-16 RX ADMIN — BUDESONIDE AND FORMOTEROL FUMARATE DIHYDRATE 2 PUFF: 80; 4.5 AEROSOL RESPIRATORY (INHALATION) at 12:21

## 2024-11-16 RX ADMIN — IPRATROPIUM BROMIDE 0.5 MG: 0.5 SOLUTION RESPIRATORY (INHALATION) at 12:47

## 2024-11-16 RX ADMIN — LEVALBUTEROL HYDROCHLORIDE 1.25 MG: 1.25 SOLUTION RESPIRATORY (INHALATION) at 07:20

## 2024-11-16 NOTE — CASE MANAGEMENT
Case Management Progress Note    Patient name Sophie Jamil  Location [address] [place] [address]/4 [place] [address]-* MRN [id number]  : 1965 Date 2024       LOS (days): 5  Geometric Mean LOS (GMLOS) (days): 2.7  Days to GMLOS:-1.8        OBJECTIVE:        Current admission status: Inpatient  Preferred Pharmacy:   [facility] - [place], [place] -  [address] [facility] [address]  Phone: [phone] Fax: [phone]    Primary Care Provider: Jose Briceno MD    Primary Insurance: [employer] [facility]  Secondary Insurance:     PROGRESS NOTE:    Call made to [facility] @ [phone] option 2 to confirm prior auth approval for inhalers. Pharmacist confirmed that auth has not been received yet.    Call made to medication auth line @ [phone] option 2, option 1, option 2, option  regarding authorization. Per prompts, office for prior auth is closed. Requesting CB at 8 AM Monday.     Plan is for D/C today pending home O2 eval. OP CM referral sent to follow-up regarding prior auth for inhalers. Patient will have inhalers to D/C with per rounding.

## 2024-11-16 NOTE — ASSESSMENT & PLAN NOTE
POA with over 10 episodes of diarrhea and 5 episodes of vomiting that started the morning of admission  Denies any recent antibiotic usage or travel. Denies abdominal pain,denies fever, or chills.  In ED, received 1 dose of Imodium  Noted afebrile, leukocytosis WBC 14.64 , procal negative  CT A/P wcontrast: Negative for acute pathology. Hepatic steatosis. Stable 3.4 cm left adrenal gland nodule.no diverticulitis noted  Cdiff PCR negative. Bacterial enteric panel could not be collected, will discontinue  Hold off further doses of imodium  Monitor off antibiotics  Patient reports no further diarrhea since admission. Noted normal BM yesterday 11/15

## 2024-11-16 NOTE — DISCHARGE SUMMARY
Discharge Summary - Hospitalist   Name: Sophie Jamil 59 y.o. female I MRN: [id number]  Unit/Bed#: 4 [place] [address] I Date of Admission: 11/11/2024   Date of Service: 11/16/2024 I Hospital Day: 5     Assessment & Plan  New onset a-fib (HCC)  POA with shortness of breath, dizziness, nausea/vomiting and diarrhea that started this morning.  She went to urgent care and she was found that her heart rate 130-150 in the office and ECG revealed afib with RVR and send to the ED for further evaluation  In ED received 15 mg of IV Cardizem x 1 & repeat EKG showed that patient had converted to sinus rhythm  Suspect new onset A-fib secondary to electrolyte imbalance, diarrhea and acute COPD exacerbation  Troponins negative x 2  [id number] 2. Transitioned to Eliquis 5mg BID  Echo: The left ventricular ejection fraction is 60%. Systolic function is normal. Wall motion is normal. Diastolic function is normal.   Metoprolol tartrate 50mg BID for rate control  Monitor on telemetry  Optimize electrolytes  Consult to Cardiology, recommendations are appreciated. Outpatient follow up  Chronic obstructive pulmonary disease with acute exacerbation (HCC)  POA with shortness of breath and expiratory wheezing starting the morning of admission. Per patient ran out of home albuterol inhaler  D dimer elevated: 1.07  CTA chest: Negative for pulmonary embolism. Mild centrilobular emphysema. Left lower lobe calcified granuloma. Minimal scarring at the base of the lingula there is no tracheal or endobronchial lesion  Transition to oral prednisone course at discharge  New prescriptions for nebulizer machine with duo-neb Q6H PRN, albuterol inhaler Q6H PRN, Trelegy sent to pharmacy and is pending prior authorization. Will start on Symbicort inpatient and send patient home with inhaler for coverage until Pulmonology office can follow up on Trelegy approval.   Completed 3 day course of azithromycin  Home O2 study completed. No oxygen requirements at  discharge  Respiratory protocol  Consult to Pulmonology, recommendations are appreciated. Referral placed for close follow up.  Diarrhea  POA with over 10 episodes of diarrhea and 5 episodes of vomiting that started the morning of admission  Denies any recent antibiotic usage or travel. Denies abdominal pain,denies fever, or chills.  In ED, received 1 dose of Imodium  Noted afebrile, leukocytosis WBC 14.64 , procal negative  CT A/P wcontrast: Negative for acute pathology. Hepatic steatosis. Stable 3.4 cm left adrenal gland nodule.no diverticulitis noted  Cdiff PCR negative. Bacterial enteric panel could not be collected, will discontinue  Hold off further doses of imodium  Monitor off antibiotics  Patient reports no further diarrhea since admission. Noted normal BM yesterday 11/15  Nicotine dependence  Smokes 0.5 PPD for greater than 50 years  Continue nicotine patch  Smoking cessation provided     ETOH abuse  Per patient drinks 40 oz beer daily. Per patient last drink was prior to ED arrival  Denies w/d seizures  Not in acute withdrawal  Ethanol level negative  Continue CIWA protocol   Initiate multivitamin,  thiamine and folic acid  Complete alcohol cessation discussed with patient  MDD (major depressive disorder)  Mood stable  Continue home Prozac and Zyprexa    Acute respiratory failure with hypoxia (HCC)  Currently on 2L NC, not on oxygen at home  In the setting of COPD exacerbation, see treatment above  Titrate oxygen to maintain O2 89% or greater  Home O2 study prior to discharge     Medical Problems       Resolved Problems  Date Reviewed: 3/25/2024   None       Discharging Physician / Practitioner: Kerri Pena PA-C  PCP: Jose Briceno MD  Admission Date:   Admission Orders (From admission, onward)       Ordered        11/11/24 2128  INPATIENT ADMISSION  Once                          Discharge Date: 11/16/24    Consultations During Hospital Stay:  Cardiology  Pulmonology    Procedures Performed:    None    Significant Findings / Test Results:   CXR (11/11/24):  No acute cardiopulmonary disease.  CT A/P (11/11/24): Stable 3.4 cm left adrenal gland nodule. Colonic diverticulosis without evidence of diverticulitis.  CTA chest (11/11/24): No evidence of acute pulmonary embolus, thoracic aortic aneurysm or dissection. No acute cardiopulmonary process.     Incidental Findings:   None     Test Results Pending at Discharge (will require follow up):   None     Outpatient Tests Requested:  None    Complications:  None    Reason for Admission: New onset afib    Hospital Course:   Sophie Jamil is a 59 y.o. female patient who originally presented to the hospital on 11/11/2024 due to shortness of breath, dizziness, nausea and vomiting, diarrhea.  On initial workup in the ER patient was found to have new onset atrial fibrillation and likely to be in COPD exacerbation.  Patient was admitted to the hospital and started on therapeutic Lovenox, Cardizem, cardiology consultation.  Cardiology following and discontinue Cardizem and started patient on metoprolol to tartrate 50 mg twice daily.  Patient's heart rate now controlled.  Patient was transitioned to Eliquis 5 mg twice daily.  Echo done showed ejection fraction of 68% with normal systolic and diastolic function.  Referral for outpatient cardiology follow-up after discharge.  Patient also with acute respiratory failure in the setting of COPD.  She was requiring 2 L nasal cannula and started on scheduled nebulizers, IV Solu-Medrol, azithromycin.  Pulmonology consulted and following.  Patient completed 3-day course of azithromycin.  CT negative for any evidence of pneumonia.  Patient was transition to oral prednisone taper at discharge.  Patient will be discharged with as needed nebulizer treatments, albuterol inhaler, Symbicort inhaler with pending [facility] prior authorization.  Home O2 evaluation completed.  Case management following for safe discharge planning.  Referral for  pulmonology follow-up after discharge.  Patient reports that breathing is much improved on day of discharge and denies any cardiac complaints.  Stable for discharge home today.  All patient and family questions answered to the best of my ability.    Please see above list of diagnoses and related plan for additional information.     Condition at Discharge: fair    Discharge Day Visit / Exam:   Subjective:  Patient sitting upright in bed. Reports that her breathing feels better. Denies any cardiac complaints. Requesting discharge home today.   Vitals: Blood Pressure: 124/82 (11/16/24 0414)  Pulse: 58 (11/16/24 0414)  Temperature: 98.3 °F (36.8 °C) (11/16/24 0414)  Temp Source: Oral (11/15/24 2018)  Respirations: 18 (11/16/24 0414)  Height: 5' (152.4 cm) (11/12/24 0920)  Weight - Scale: 72.6 kg (160 lb) (11/12/24 0920)  SpO2: 97 % (11/16/24 0720)  Physical Exam  Vitals and nursing note reviewed.   Constitutional:       General: She is not in acute distress.     Appearance: She is well-developed. She is ill-appearing.   HENT:      Head: Normocephalic and atraumatic.   Eyes:      Conjunctiva/sclera: Conjunctivae normal.   Cardiovascular:      Rate and Rhythm: Normal rate and regular rhythm.      Heart sounds: No murmur heard.  Pulmonary:      Effort: Pulmonary effort is normal. No respiratory distress.      Breath sounds: Wheezing present.   Abdominal:      Palpations: Abdomen is soft.      Tenderness: There is no abdominal tenderness.   Musculoskeletal:         General: No swelling.      Cervical back: Neck supple.      Right lower leg: No edema.      Left lower leg: No edema.   Skin:     General: Skin is warm and dry.      Capillary Refill: Capillary refill takes less than 2 seconds.   Neurological:      Mental Status: She is alert. Mental status is at baseline.   Psychiatric:         Mood and Affect: Mood normal.          Discussion with Family: Updated  (significant other) at bedside.    Discharge  instructions/Information to patient and family:   See after visit summary for information provided to patient and family.      Provisions for Follow-Up Care:  See after visit summary for information related to follow-up care and any pertinent home health orders.      Mobility at time of Discharge:   Basic Mobility Inpatient Raw Score: 17  JH-HLM Goal: 5: Stand one or more mins  JH-HLM Achieved: 7: Walk 25 feet or more  HLM Goal achieved. Continue to encourage appropriate mobility.     Disposition:   Home    Planned Readmission: None    Discharge Medications:  See after visit summary for reconciled discharge medications provided to patient and/or family.      Administrative Statements   Discharge Statement:  I have spent a total time of 50 minutes in caring for this patient on the day of the visit/encounter. >30 minutes of time was spent on: Diagnostic results, Risks and benefits of tx options, Instructions for management, Patient and family education, Importance of tx compliance, Counseling / Coordination of care, Documenting in the medical record, Reviewing / ordering tests, medicine, procedures  , and Communicating with other healthcare professionals .    **Please Note: This note may have been constructed using a voice recognition system**

## 2024-11-16 NOTE — ASSESSMENT & PLAN NOTE
POA with shortness of breath, dizziness, nausea/vomiting and diarrhea that started this morning.  She went to urgent care and she was found that her heart rate 130-150 in the office and ECG revealed afib with RVR and send to the ED for further evaluation  In ED received 15 mg of IV Cardizem x 1 & repeat EKG showed that patient had converted to sinus rhythm  Suspect new onset A-fib secondary to electrolyte imbalance, diarrhea and acute COPD exacerbation  Troponins negative x 2  [id number] 2. Transitioned to Eliquis 5mg BID  Echo: The left ventricular ejection fraction is 60%. Systolic function is normal. Wall motion is normal. Diastolic function is normal.   Metoprolol tartrate 50mg BID for rate control  Monitor on telemetry  Optimize electrolytes  Consult to Cardiology, recommendations are appreciated. Outpatient follow up

## 2024-11-16 NOTE — DISCHARGE INSTR - AVS FIRST PAGE
Follow ups:  -PCP  -Cardiology  -Pulmonology    New prescriptions:  -Eliquis 5mg twice daily (PLEASE USE 30 day free trial coupon card given at the hospital). Please discuss with Cardiology about further anticoagulation after 30 days.   -Metoprolol tartrate 50mg twice daily  -Duo-neb nebulization OR albuterol inhaler every 6 hours as needed for wheezing/shortness of breath   -Prednisone 40mg daily x 5 days THEN 30mg daily x 5 days THEN 20mg daily x 5 days THEN 10mg daily x 5 days  -Nicotine patch 7mg every 24 hours    -Please use Symbicort inhaler 2 puffs twice daily provided by the hospital to hold you over until new inhaler can get approved. The other long-acting inhalers (Trelegy and Breztri) needs prior authorization from your insurance before it can be filled at the pharmacy. Pulmonology will see you in the office and follow up with this medication.     Other instructions:  -Encourage smoking and alcohol cessation  -Cardiac diet (see dietary list below)

## 2024-11-16 NOTE — ASSESSMENT & PLAN NOTE
POA with shortness of breath and expiratory wheezing starting the morning of admission. Per patient ran out of home albuterol inhaler  D dimer elevated: 1.07  CTA chest: Negative for pulmonary embolism. Mild centrilobular emphysema. Left lower lobe calcified granuloma. Minimal scarring at the base of the lingula there is no tracheal or endobronchial lesion  Transition to oral prednisone course at discharge  New prescriptions for nebulizer machine with duo-neb Q6H PRN, albuterol inhaler Q6H PRN, Trelegy sent to pharmacy and is pending prior authorization. Will start on Symbicort inpatient and send patient home with inhaler for coverage until Pulmonology office can follow up on Trelegy approval.   Completed 3 day course of azithromycin  Home O2 study completed. No oxygen requirements at discharge  Respiratory protocol  Consult to Pulmonology, recommendations are appreciated. Referral placed for close follow up.

## 2024-11-16 NOTE — NURSING NOTE
AVS reviewed with patient and significant other including follow up appointments, discharge instructions and medications. All questions answered. Discharged to home with belongings and nebulizer at this time.

## 2024-11-16 NOTE — PLAN OF CARE
Problem: PAIN - ADULT  Goal: Verbalizes/displays adequate comfort level or baseline comfort level  Description: Interventions:  - Encourage patient to monitor pain and request assistance  - Assess pain using appropriate pain scale  - Administer analgesics based on type and severity of pain and evaluate response  - Implement non-pharmacological measures as appropriate and evaluate response  - Consider cultural and social influences on pain and pain management  - Notify physician/advanced practitioner if interventions unsuccessful or patient reports new pain  Outcome: Adequate for Discharge     Problem: INFECTION - ADULT  Goal: Absence or prevention of progression during hospitalization  Description: INTERVENTIONS:  - Assess and monitor for signs and symptoms of infection  - Monitor lab/diagnostic results  - Monitor all insertion sites, i.e. indwelling lines, tubes, and drains  - Monitor endotracheal if appropriate and nasal secretions for changes in amount and color  - Show Low appropriate cooling/warming therapies per order  - Administer medications as ordered  - Instruct and encourage patient and family to use good hand hygiene technique  - Identify and instruct in appropriate isolation precautions for identified infection/condition  Outcome: Adequate for Discharge  Goal: Absence of fever/infection during neutropenic period  Description: INTERVENTIONS:  - Monitor WBC    Outcome: Adequate for Discharge     Problem: SAFETY ADULT  Goal: Patient will remain free of falls  Description: INTERVENTIONS:  - Educate patient/family on patient safety including physical limitations  - Instruct patient to call for assistance with activity   - Consult OT/PT to assist with strengthening/mobility   - Keep Call bell within reach  - Keep bed low and locked with side rails adjusted as appropriate  - Keep care items and personal belongings within reach  - Initiate and maintain comfort rounds  - Make Fall Risk Sign visible to  staff  - Offer Toileting every 2 Hours, in advance of need  - Initiate/Maintain bed alarm  - Obtain necessary fall risk management equipment: slipper socks  - Apply yellow socks and bracelet for high fall risk patients  - Consider moving patient to room near nurses station  Outcome: Adequate for Discharge  Goal: Maintain or return to baseline ADL function  Description: INTERVENTIONS:  -  Assess patient's ability to carry out ADLs; assess patient's baseline for ADL function and identify physical deficits which impact ability to perform ADLs (bathing, care of mouth/teeth, toileting, grooming, dressing, etc.)  - Assess/evaluate cause of self-care deficits   - Assess range of motion  - Assess patient's mobility; develop plan if impaired  - Assess patient's need for assistive devices and provide as appropriate  - Encourage maximum independence but intervene and supervise when necessary  - Involve family in performance of ADLs  - Assess for home care needs following discharge   - Consider OT consult to assist with ADL evaluation and planning for discharge  - Provide patient education as appropriate  Outcome: Adequate for Discharge  Goal: Maintains/Returns to pre admission functional level  Description: INTERVENTIONS:  - Perform AM-PAC 6 Click Basic Mobility/ Daily Activity assessment daily.  - Set and communicate daily mobility goal to care team and patient/family/caregiver.   - Collaborate with rehabilitation services on mobility goals if consulted  - Perform Range of Motion 3 times a day.  - Reposition patient every 2 hours.  - Dangle patient 3 times a day  - Stand patient 3 times a day  - Ambulate patient 3 times a day  - Out of bed to chair 3 times a day   - Out of bed for meals 3 times a day  - Out of bed for toileting  - Record patient progress and toleration of activity level   Outcome: Adequate for Discharge     Problem: DISCHARGE PLANNING  Goal: Discharge to home or other facility with appropriate  resources  Description: INTERVENTIONS:  - Identify barriers to discharge w/patient and caregiver  - Arrange for needed discharge resources and transportation as appropriate  - Identify discharge learning needs (meds, wound care, etc.)  - Arrange for interpretive services to assist at discharge as needed  - Refer to Case Management Department for coordinating discharge planning if the patient needs post-hospital services based on physician/advanced practitioner order or complex needs related to functional status, cognitive ability, or social support system  Outcome: Adequate for Discharge     Problem: Knowledge Deficit  Goal: Patient/family/caregiver demonstrates understanding of disease process, treatment plan, medications, and discharge instructions  Description: Complete learning assessment and assess knowledge base.  Interventions:  - Provide teaching at level of understanding  - Provide teaching via preferred learning methods  Outcome: Adequate for Discharge     Problem: GASTROINTESTINAL - ADULT  Goal: Minimal or absence of nausea and/or vomiting  Description: INTERVENTIONS:  - Administer IV fluids if ordered to ensure adequate hydration  - Maintain NPO status until nausea and vomiting are resolved  - Nasogastric tube if ordered  - Administer ordered antiemetic medications as needed  - Provide nonpharmacologic comfort measures as appropriate  - Advance diet as tolerated, if ordered  - Consider nutrition services referral to assist patient with adequate nutrition and appropriate food choices  Outcome: Adequate for Discharge  Goal: Maintains or returns to baseline bowel function  Description: INTERVENTIONS:  - Assess bowel function  - Encourage oral fluids to ensure adequate hydration  - Administer IV fluids if ordered to ensure adequate hydration  - Administer ordered medications as needed  - Encourage mobilization and activity  - Consider nutritional services referral to assist patient with adequate nutrition  and appropriate food choices  Outcome: Adequate for Discharge  Goal: Maintains adequate nutritional intake  Description: INTERVENTIONS:  - Monitor percentage of each meal consumed  - Identify factors contributing to decreased intake, treat as appropriate  - Assist with meals as needed  - Monitor I&O, weight, and lab values if indicated  - Obtain nutrition services referral as needed  Outcome: Adequate for Discharge  Goal: Establish and maintain optimal ostomy function  Description: INTERVENTIONS:  - Assess bowel function  - Encourage oral fluids to ensure adequate hydration  - Administer IV fluids if ordered to ensure adequate hydration   - Administer ordered medications as needed  - Encourage mobilization and activity  - Nutrition services referral to assist patient with appropriate food choices  - Assess stoma site  - Consider wound care consult   Outcome: Adequate for Discharge  Goal: Oral mucous membranes remain intact  Description: INTERVENTIONS  - Assess oral mucosa and hygiene practices  - Implement preventative oral hygiene regimen  - Implement oral medicated treatments as ordered  - Initiate Nutrition services referral as needed  Outcome: Adequate for Discharge     Problem: RESPIRATORY - ADULT  Goal: Achieves optimal ventilation and oxygenation  Description: INTERVENTIONS:  - Assess for changes in respiratory status  - Assess for changes in mentation and behavior  - Position to facilitate oxygenation and minimize respiratory effort  - Oxygen administered by appropriate delivery if ordered  - Initiate smoking cessation education as indicated  - Encourage broncho-pulmonary hygiene including cough, deep breathe, Incentive Spirometry  - Assess the need for suctioning and aspirate as needed  - Assess and instruct to report SOB or any respiratory difficulty  - Respiratory Therapy support as indicated  Outcome: Adequate for Discharge     Problem: Prexisting or High Potential for Compromised Skin Integrity  Goal:  Skin integrity is maintained or improved  Description: INTERVENTIONS:  - Identify patients at risk for skin breakdown  - Assess and monitor skin integrity  - Assess and monitor nutrition and hydration status  - Monitor labs   - Assess for incontinence   - Turn and reposition patient  - Assist with mobility/ambulation  - Relieve pressure over bony prominences  - Avoid friction and shearing  - Provide appropriate hygiene as needed including keeping skin clean and dry  - Evaluate need for skin moisturizer/barrier cream  - Collaborate with interdisciplinary team   - Patient/family teaching  - Consider wound care consult   Outcome: Adequate for Discharge

## 2024-11-16 NOTE — RESPIRATORY THERAPY NOTE
Home Oxygen Qualifying Test     Patient name: Sophie Jamil        : 1965   Date of Test:  2024  Diagnosis:    Home Oxygen Test:    **Medicare Guidelines require item(s) 1-5 on all ambulatory patients or 1 and 2 on non-ambulatory patients.    1. Baseline SPO2 on Room Air at rest 93 %   If <= 88% on Room Air add O2 via NC to obtain SpO2 >=88%. If LPM needed, document LPM N/a needed to reach =>88%    SPO2 during exertion on Room Air 94  %  During exertion monitor SPO2. If SPO2 increases >=89%, do not add supplemental oxygen    SPO2 on Oxygen at Rest N/A % at N/A LPM    SPO2 during exertion on Oxygen N/a % at n/a LPM    Test performed during exertion activity.      []  Supplemental Home Oxygen is indicated.    [x]  Client does not qualify for home oxygen.    Respiratory Additional Notes-     Florecita Greene, RT

## 2024-11-18 ENCOUNTER — PATIENT OUTREACH (OUTPATIENT)
Dept: CASE MANAGEMENT | Facility: OTHER | Age: 59
End: 2024-11-18

## 2024-11-18 ENCOUNTER — TRANSITIONAL CARE MANAGEMENT (OUTPATIENT)
Dept: FAMILY MEDICINE CLINIC | Facility: CLINIC | Age: 59
End: 2024-11-18

## 2024-11-18 ENCOUNTER — TELEMEDICINE (OUTPATIENT)
Dept: PULMONOLOGY | Facility: MEDICAL CENTER | Age: 59
End: 2024-11-18
Payer: COMMERCIAL

## 2024-11-18 ENCOUNTER — PATIENT OUTREACH (OUTPATIENT)
Age: 59
End: 2024-11-18

## 2024-11-18 DIAGNOSIS — Z71.89 COMPLEX CARE COORDINATION: Primary | ICD-10-CM

## 2024-11-18 DIAGNOSIS — F17.210 CIGARETTE NICOTINE DEPENDENCE WITHOUT COMPLICATION: ICD-10-CM

## 2024-11-18 DIAGNOSIS — J44.1 CHRONIC OBSTRUCTIVE PULMONARY DISEASE WITH ACUTE EXACERBATION (HCC): Primary | ICD-10-CM

## 2024-11-18 PROBLEM — J96.01 ACUTE RESPIRATORY FAILURE WITH HYPOXIA (HCC): Status: RESOLVED | Noted: 2024-11-14 | Resolved: 2024-11-18

## 2024-11-18 PROCEDURE — 99214 OFFICE O/P EST MOD 30 MIN: CPT | Performed by: NURSE PRACTITIONER

## 2024-11-18 NOTE — ASSESSMENT & PLAN NOTE
Sophie has cut down from 4 cigarettes daily to 2 cigarettes daily.  She will continue to work toward complete cessation.  She is using nicotine patches.

## 2024-11-18 NOTE — ASSESSMENT & PLAN NOTE
Sophie is recovering nicely from this.  She will continue prednisone taper as previously delineated.  She will also continue Trelegy with DuoNebs nebulized and albuterol as needed.  She is aware of proper use and technique.  She did not require oxygen when she left the hospital.  She will continue with activity as tolerated.  All questions answered.

## 2024-11-18 NOTE — PROGRESS NOTES
Received referral via in basket from inpatient care management.   Sophie was [id number]/16/24 with New Onset A-Fib. Past medical history of COPD, Tobacco use.  Discharged to home with [facility].  Telephone call to patient, left message on machine with my name, number and request for return call.

## 2024-11-18 NOTE — PROGRESS NOTES
Virtual Visit?:  Yes -   Virtual Regular Visit    Verification of patient location:  Patient is located at Home in the following state in which I hold an active license [place]    The patient was identified by name and date of birth. Sophie Jamil was informed that this is a telemedicine visit and that the visit is being conducted through Telephone.  My office door was closed. No one else was in the room.  She acknowledged consent and understanding of privacy and security of the video platform. The patient has agreed to participate and understands they can discontinue the visit at any time.    Patient is aware this is a billable service.    I spent 5 minutes directly with the patient during this visit      Subjective   Chief Complaint: Sophie Jamil is a 59 y.o. female presenting for follow up after recent hospitalization for COPD exacerbation.  Overall, Sophie reports she is doing better.  She reports that she has very rare wheezing, which is improved from her typical baseline.  Her shortness of breath is back to baseline.  She has an occasional cough, but no significant sputum production, fevers, or chills.  No other complaints.  She has smoked 2 cigarettes a day since she left the hospital.  She did not require oxygen when she left the hospital.  She did get her Trelegy and is using that 1 puff once daily.  She is also using DuoNebs in her nebulizer 3 times daily and albuterol MDI as needed.    HPI/PMH:   Current symptoms: mild wheezing   Provoking/Palliating Factors: during the day.   Frequency: intermittent.   Maintenance therapy:  Trelegy with DuoNebs nebulized TID and albuterol MDI PRN  Treatment compliance: compliant all of the time  Comfortable with inhaler technique:  yes  Able to perform ADL's:  sometimes  Smoking: Yes, education provided  Requirement for O2: No    The patient's history were reviewed by a provider in this encounter and updated as appropriate.    ROS:   Pertinent items are noted in HPI.    Objective    PHYSICAL EXAM:   There were no vitals taken for this visit. Unable to obtain VS due to lack of equipment and non-video visit  General appearance: alert and oriented, in no acute distress  No cough or wheezing audible.      Pertinent Lab Results:   Hemoglobin   Date Value Ref Range Status   11/16/2024 11.3 (L) 11.5 - 15.4 g/dL Final     Hematocrit   Date Value Ref Range Status   11/16/2024 34.8 34.8 - 46.1 % Final     TSH   Date Value Ref Range Status   04/21/2022 2.350 0.450 - 4.500 uIU/mL Final        Imaging:    CTA chest pe study  Result Date: 11/11/2024  Impression No evidence of acute pulmonary embolus, thoracic aortic aneurysm or dissection. No acute cardiopulmonary process. Workstation performed: [id number]     No CT Chest results available for this patient.  No Chest XR results available for this patient.    Assessment/Plan:    Problem List Items Addressed This Visit          Respiratory    Chronic obstructive pulmonary disease with acute exacerbation (HCC) - Primary    Sophie is recovering nicely from this.  She will continue prednisone taper as previously delineated.  She will also continue Trelegy with DuoNebs nebulized and albuterol as needed.  She is aware of proper use and technique.  She did not require oxygen when she left the hospital.  She will continue with activity as tolerated.  All questions answered.            Behavioral Health    Nicotine dependence    Sophie has cut down from 4 cigarettes daily to 2 cigarettes daily.  She will continue to work toward complete cessation.  She is using nicotine patches.          Return in 2 weeks (on 12/2/2024).    All of Sophie's questions were answered prior to ending the call today. She will follow-up in 2 weeks or sooner should the need arise. She is aware to call our office with any further questions or concerns.      LADONNA Cruz

## 2024-11-18 NOTE — UTILIZATION REVIEW
NOTIFICATION OF ADMISSION DISCHARGE   This is a Notification of Discharge from [facility]. Please be advised that this patient has been discharge from our facility. Below you will find the admission and discharge date and time including the patient’s disposition.   UTILIZATION REVIEW CONTACT:  Kiersten Hale  Utilization   Network Utilization Review Department  Phone: [phone] x carefully listen to the prompts. All voicemails are confidential.  Email: [email or web address]     ADMISSION INFORMATION  PRESENTATION DATE: 11/11/2024  4:56 PM  OBERVATION ADMISSION DATE: N/A  INPATIENT ADMISSION DATE: 11/11/24  9:28 PM   DISCHARGE DATE: 11/16/2024  3:21 PM   DISPOSITION:Home with Home Health Care    Network Utilization Review Department  ATTENTION: Please call with any questions or concerns to [phone] and carefully listen to the prompts so that you are directed to the right person. All voicemails are confidential.   For Discharge needs, contact Care Management DC Support Team at [phone] opt. 2  Send all requests for admission clinical reviews, approved or denied determinations and any other requests to dedicated fax number below belonging to the campus where the patient is receiving treatment. List of dedicated fax numbers for the Facilities:  FACILITY NAME UR FAX NUMBER   ADMISSION DENIALS (Administrative/Medical Necessity) [phone]   DISCHARGE SUPPORT TEAM ([facility]) [phone]   PARENT CHILD HEALTH (Maternity/NICU/Pediatrics) [phone]   [facility] [phone]   [facility] [phone]   [facility] [phone]   [facility] [phone]   [facility] [phone]   [facility] [phone]   [facility] [phone]   [facility]  [place] [phone]   [facility] [phone]   [facility] [phone]   [facility] [phone]   [facility] [phone]

## 2024-11-18 NOTE — PROGRESS NOTES
Received call from Brittany Intake at [facility]. Brittany is requesting orders for SOC. Chart review done. Pt is not a [facility] patient. Advised Brittany. She will reach out to patients provider for orders.

## 2024-11-18 NOTE — PROGRESS NOTES
Telephone call to Ivanna at [facility].  They will be making home visit with patient.

## 2024-11-19 ENCOUNTER — TELEPHONE (OUTPATIENT)
Dept: CARDIOLOGY CLINIC | Facility: CLINIC | Age: 59
End: 2024-11-19

## 2024-11-21 ENCOUNTER — PATIENT OUTREACH (OUTPATIENT)
Dept: CASE MANAGEMENT | Facility: OTHER | Age: 59
End: 2024-11-21

## 2024-11-21 NOTE — PROGRESS NOTES
Telephone call to Sophie, introduced self and role of RN Care Manager.  Confirmed she is receiving home health services and nurse to make home visit tomorrow.  She is not interested in follow up calls at this time.  Sophie is to follow up with PCP on 11/26/24 and has secured transportation.   Verified she has her nebulizer and all medications. Reminded to rinse mouth after nebulizer use.  Declines further telephone calls, episode closed, removed self from care team.

## 2024-11-23 ENCOUNTER — APPOINTMENT (EMERGENCY)
Dept: RADIOLOGY | Facility: HOSPITAL | Age: 59
End: 2024-11-23
Payer: COMMERCIAL

## 2024-11-23 ENCOUNTER — HOSPITAL ENCOUNTER (EMERGENCY)
Facility: HOSPITAL | Age: 59
Discharge: HOME | End: 2024-11-23
Attending: EMERGENCY MEDICINE
Payer: COMMERCIAL

## 2024-11-23 VITALS
DIASTOLIC BLOOD PRESSURE: 68 MMHG | HEIGHT: 60 IN | OXYGEN SATURATION: 96 % | SYSTOLIC BLOOD PRESSURE: 132 MMHG | WEIGHT: 167.99 LBS | BODY MASS INDEX: 32.98 KG/M2 | RESPIRATION RATE: 21 BRPM | TEMPERATURE: 97.8 F | HEART RATE: 49 BPM

## 2024-11-23 DIAGNOSIS — I50.9 CHF (CONGESTIVE HEART FAILURE) (HCC): Primary | ICD-10-CM

## 2024-11-23 LAB
2HR DELTA HS TROPONIN: 1 NG/L
ALBUMIN SERPL BCG-MCNC: 3.8 G/DL (ref 3.5–5)
ALP SERPL-CCNC: 63 U/L (ref 34–104)
ALT SERPL W P-5'-P-CCNC: 65 U/L (ref 7–52)
ANION GAP SERPL CALCULATED.3IONS-SCNC: 8 MMOL/L (ref 4–13)
APTT PPP: 26 SECONDS (ref 23–34)
AST SERPL W P-5'-P-CCNC: 36 U/L (ref 13–39)
BACTERIA UR QL AUTO: NORMAL /HPF
BASOPHILS # BLD AUTO: 0.05 THOUSANDS/ÂΜL (ref 0–0.1)
BASOPHILS NFR BLD AUTO: 0 % (ref 0–1)
BILIRUB SERPL-MCNC: 0.44 MG/DL (ref 0.2–1)
BILIRUB UR QL STRIP: NEGATIVE
BNP SERPL-MCNC: 408 PG/ML (ref 0–100)
BUN SERPL-MCNC: 5 MG/DL (ref 5–25)
CALCIUM SERPL-MCNC: 9.1 MG/DL (ref 8.4–10.2)
CARDIAC TROPONIN I PNL SERPL HS: 5 NG/L (ref ?–50)
CARDIAC TROPONIN I PNL SERPL HS: 6 NG/L (ref ?–50)
CHLORIDE SERPL-SCNC: 93 MMOL/L (ref 96–108)
CLARITY UR: CLEAR
CO2 SERPL-SCNC: 31 MMOL/L (ref 21–32)
COLOR UR: YELLOW
CREAT SERPL-MCNC: 0.64 MG/DL (ref 0.6–1.3)
EOSINOPHIL # BLD AUTO: 0.05 THOUSAND/ÂΜL (ref 0–0.61)
EOSINOPHIL NFR BLD AUTO: 0 % (ref 0–6)
ERYTHROCYTE [DISTWIDTH] IN BLOOD BY AUTOMATED COUNT: 13 % (ref 11.6–15.1)
GFR SERPL CREATININE-BSD FRML MDRD: 96 ML/MIN/1.73SQ M
GLUCOSE SERPL-MCNC: 104 MG/DL (ref 65–140)
GLUCOSE UR STRIP-MCNC: NEGATIVE MG/DL
HCT VFR BLD AUTO: 38.1 % (ref 34.8–46.1)
HGB BLD-MCNC: 12.5 G/DL (ref 11.5–15.4)
HGB UR QL STRIP.AUTO: ABNORMAL
IMM GRANULOCYTES # BLD AUTO: 0.31 THOUSAND/UL (ref 0–0.2)
IMM GRANULOCYTES NFR BLD AUTO: 1 % (ref 0–2)
INR PPP: 1 (ref 0.85–1.19)
KETONES UR STRIP-MCNC: NEGATIVE MG/DL
LEUKOCYTE ESTERASE UR QL STRIP: ABNORMAL
LYMPHOCYTES # BLD AUTO: 4.13 THOUSANDS/ÂΜL (ref 0.6–4.47)
LYMPHOCYTES NFR BLD AUTO: 18 % (ref 14–44)
MCH RBC QN AUTO: 32.6 PG (ref 26.8–34.3)
MCHC RBC AUTO-ENTMCNC: 32.8 G/DL (ref 31.4–37.4)
MCV RBC AUTO: 100 FL (ref 82–98)
MONOCYTES # BLD AUTO: 1.63 THOUSAND/ÂΜL (ref 0.17–1.22)
MONOCYTES NFR BLD AUTO: 7 % (ref 4–12)
NEUTROPHILS # BLD AUTO: 16.79 THOUSANDS/ÂΜL (ref 1.85–7.62)
NEUTS SEG NFR BLD AUTO: 74 % (ref 43–75)
NITRITE UR QL STRIP: NEGATIVE
NON-SQ EPI CELLS URNS QL MICRO: NORMAL /HPF
NRBC BLD AUTO-RTO: 0 /100 WBCS
PH UR STRIP.AUTO: 6.5 [PH]
PLATELET # BLD AUTO: 374 THOUSANDS/UL (ref 149–390)
PMV BLD AUTO: 10.6 FL (ref 8.9–12.7)
POTASSIUM SERPL-SCNC: 3.5 MMOL/L (ref 3.5–5.3)
PROCALCITONIN SERPL-MCNC: <0.05 NG/ML
PROT SERPL-MCNC: 6.1 G/DL (ref 6.4–8.4)
PROT UR STRIP-MCNC: NEGATIVE MG/DL
PROTHROMBIN TIME: 13.7 SECONDS (ref 12.3–15)
RBC # BLD AUTO: 3.83 MILLION/UL (ref 3.81–5.12)
RBC #/AREA URNS AUTO: NORMAL /HPF
SODIUM SERPL-SCNC: 132 MMOL/L (ref 135–147)
SP GR UR STRIP.AUTO: 1.01 (ref 1–1.03)
UROBILINOGEN UR STRIP-ACNC: <2 MG/DL
WBC # BLD AUTO: 22.96 THOUSAND/UL (ref 4.31–10.16)
WBC #/AREA URNS AUTO: NORMAL /HPF

## 2024-11-23 PROCEDURE — 36415 COLL VENOUS BLD VENIPUNCTURE: CPT | Performed by: EMERGENCY MEDICINE

## 2024-11-23 PROCEDURE — 81001 URINALYSIS AUTO W/SCOPE: CPT | Performed by: EMERGENCY MEDICINE

## 2024-11-23 PROCEDURE — 84484 ASSAY OF TROPONIN QUANT: CPT | Performed by: EMERGENCY MEDICINE

## 2024-11-23 PROCEDURE — 83880 ASSAY OF NATRIURETIC PEPTIDE: CPT | Performed by: EMERGENCY MEDICINE

## 2024-11-23 PROCEDURE — 85730 THROMBOPLASTIN TIME PARTIAL: CPT | Performed by: EMERGENCY MEDICINE

## 2024-11-23 PROCEDURE — 80053 COMPREHEN METABOLIC PANEL: CPT | Performed by: EMERGENCY MEDICINE

## 2024-11-23 PROCEDURE — 71045 X-RAY EXAM CHEST 1 VIEW: CPT

## 2024-11-23 PROCEDURE — 96374 THER/PROPH/DIAG INJ IV PUSH: CPT

## 2024-11-23 PROCEDURE — 85610 PROTHROMBIN TIME: CPT | Performed by: EMERGENCY MEDICINE

## 2024-11-23 PROCEDURE — 99285 EMERGENCY DEPT VISIT HI MDM: CPT

## 2024-11-23 PROCEDURE — 99285 EMERGENCY DEPT VISIT HI MDM: CPT | Performed by: EMERGENCY MEDICINE

## 2024-11-23 PROCEDURE — 85025 COMPLETE CBC W/AUTO DIFF WBC: CPT | Performed by: EMERGENCY MEDICINE

## 2024-11-23 PROCEDURE — 93005 ELECTROCARDIOGRAM TRACING: CPT

## 2024-11-23 PROCEDURE — 84145 PROCALCITONIN (PCT): CPT | Performed by: EMERGENCY MEDICINE

## 2024-11-23 RX ORDER — FUROSEMIDE 20 MG/1
20 TABLET ORAL 2 TIMES DAILY
Qty: 20 TABLET | Refills: 0 | Status: SHIPPED | OUTPATIENT
Start: 2024-11-23

## 2024-11-23 RX ORDER — FUROSEMIDE 10 MG/ML
40 INJECTION INTRAMUSCULAR; INTRAVENOUS ONCE
Status: COMPLETED | OUTPATIENT
Start: 2024-11-23 | End: 2024-11-23

## 2024-11-23 RX ADMIN — FUROSEMIDE 40 MG: 10 INJECTION, SOLUTION INTRAMUSCULAR; INTRAVENOUS at 09:36

## 2024-11-25 LAB
ATRIAL RATE: 329 BPM
QRS AXIS: 32 DEGREES
QRS AXIS: 44 DEGREES
QRSD INTERVAL: 54 MS
QRSD INTERVAL: 64 MS
QT INTERVAL: 462 MS
QT INTERVAL: 496 MS
QTC INTERVAL: 426 MS
QTC INTERVAL: 462 MS
T WAVE AXIS: 49 DEGREES
T WAVE AXIS: 68 DEGREES
VENTRICULAR RATE: 51 BPM
VENTRICULAR RATE: 52 BPM

## 2024-11-25 PROCEDURE — 93010 ELECTROCARDIOGRAM REPORT: CPT | Performed by: INTERNAL MEDICINE

## 2024-11-26 ENCOUNTER — TELEPHONE (OUTPATIENT)
Dept: FAMILY MEDICINE CLINIC | Facility: CLINIC | Age: 59
End: 2024-11-26

## 2024-11-26 NOTE — TELEPHONE ENCOUNTER
Called to offer a virtual appt for patient as she missed todays TCM appt. Patient unable to complete as she doesn't have a smart phone with access to video calls

## 2024-12-04 ENCOUNTER — TELEPHONE (OUTPATIENT)
Dept: PULMONOLOGY | Facility: MEDICAL CENTER | Age: 59
End: 2024-12-04

## 2024-12-04 NOTE — TELEPHONE ENCOUNTER
Called patient to reschedule missed follow up appointment, unable to get through to number in chart.  No show letter mailed

## 2024-12-12 ENCOUNTER — OFFICE VISIT (OUTPATIENT)
Dept: CARDIOLOGY CLINIC | Facility: CLINIC | Age: 59
End: 2024-12-12
Payer: COMMERCIAL

## 2024-12-12 VITALS
HEART RATE: 58 BPM | DIASTOLIC BLOOD PRESSURE: 68 MMHG | WEIGHT: 158 LBS | BODY MASS INDEX: 31.02 KG/M2 | HEIGHT: 60 IN | OXYGEN SATURATION: 98 % | SYSTOLIC BLOOD PRESSURE: 108 MMHG

## 2024-12-12 DIAGNOSIS — I10 ESSENTIAL HYPERTENSION: ICD-10-CM

## 2024-12-12 DIAGNOSIS — F17.210 CIGARETTE NICOTINE DEPENDENCE WITHOUT COMPLICATION: ICD-10-CM

## 2024-12-12 DIAGNOSIS — F10.10 ETOH ABUSE: ICD-10-CM

## 2024-12-12 DIAGNOSIS — I48.91 NEW ONSET A-FIB (HCC): Primary | ICD-10-CM

## 2024-12-12 DIAGNOSIS — E78.2 MIXED HYPERLIPIDEMIA: ICD-10-CM

## 2024-12-12 DIAGNOSIS — I50.9 CHF (CONGESTIVE HEART FAILURE) (HCC): ICD-10-CM

## 2024-12-12 PROCEDURE — 93000 ELECTROCARDIOGRAM COMPLETE: CPT

## 2024-12-12 PROCEDURE — 99214 OFFICE O/P EST MOD 30 MIN: CPT

## 2024-12-12 RX ORDER — FUROSEMIDE 20 MG/1
20 TABLET ORAL DAILY
Qty: 90 TABLET | Refills: 3 | Status: SHIPPED | OUTPATIENT
Start: 2024-12-12

## 2024-12-12 RX ORDER — NEBULIZER AND COMPRESSOR
EACH MISCELLANEOUS DAILY
Qty: 1 KIT | Refills: 0 | Status: SHIPPED | OUTPATIENT
Start: 2024-12-12

## 2024-12-12 RX ORDER — METOPROLOL TARTRATE 50 MG
50 TABLET ORAL EVERY 12 HOURS SCHEDULED
Qty: 180 TABLET | Refills: 3 | Status: SHIPPED | OUTPATIENT
Start: 2024-12-12

## 2024-12-12 NOTE — ASSESSMENT & PLAN NOTE
ECG today sinus tiffany. (Was asymptomatic at the time of A-fib)  [id number] score = 2 (age and HTN), or 2.2% risk of stroke per year,   High risk of electrolyte imbalance. Continue Eliquis 5 mg twice daily and Lopressor 50 mg twice daily  Will reassess in 3 months

## 2024-12-12 NOTE — PROGRESS NOTES
[facility]  General Cardiology Note  Sophie Jamil  1965  [id number]      Referring Provider - Kerri Pena PA-C  Chief Complaint   Patient presents with    Follow-up     Pt denies any cardiac complaints, presents as f/u from hospital         Assessment & Plan  New onset a-fib (HCC)  ECG today sinus tiffany. (Was asymptomatic at the time of A-fib)  [id number] score = 2 (age and HTN), or 2.2% risk of stroke per year,   High risk of electrolyte imbalance. Continue Eliquis 5 mg twice daily and Lopressor 50 mg twice daily  Will reassess in 3 months  CHF (congestive heart failure) (HCC)  Wt Readings from Last 3 Encounters:   12/12/24 71.7 kg (158 lb)   11/23/24 76.2 kg (167 lb 15.9 oz)   11/12/24 72.6 kg (160 lb)   November 2024 EF 60%.  Normal diastolic function  She was prescribed Lasix 20 mg twice daily at last ED visit.  But reports taking Lasix 20 mg daily for the past week.  Does note a 9 pound weight loss. She reports no symptoms of fluid overload  Today she appears euvolemic.  We will continue on Lasix 20 mg daily, may have an extra 20 mg as needed for signs of fluid overload (weight gain, increased sob and leg edema, )  Last BMP on 11/23 with low sodium 132, likely because of alcohol use/fluid intake.  Highly advised to cut down on alcohol use, follow low sodium diet and monitor fluid intake, stay close to 60-70 oz daily.   Will repeat BMP in 1 week  Essential hypertension  Controlled.  /68 HR 58  Denies any dizziness  Continue on lisinopril 20 mg daily  Mixed hyperlipidemia  June 2024 Trig 168,   On crestor 20 mg daily  Reinforced heart healthy diet  ETOH abuse  Advised to cut down on alcohol use  Cigarette nicotine dependence without complication  Has cut down to 2 cigarettes daily. She will continue to work toward complete cessation. She is using nicotine patches.        Relevant testing  -November 2024 echocardiogram:   Left Ventricle: Left ventricular cavity size is  normal. Wall thickness is normal. The left ventricular ejection fraction is 60%. Systolic function is normal. Wall motion is normal. Diastolic function is normal.    Right Ventricle: Systolic function is normal. Normal tricuspid annular plane systolic excursion (TAPSE) > 1.7 cm.    Left Atrium: The atrium is normal in size.    Right Atrium: The atrium is normal in size.    Mitral Valve: There is trace regurgitation.    - 11/11/2024 CT abdomen and pelvis: no acute inflammatory process, concern for hepatic steatosis   - 11/11/2024 CT chest PE protocol, negative for PE but presence of mild centrilobular emphysema noted     Will RTO in 3 months or sooner if necessary. Will call with any concerns.     Interval History: 59 y.o.  female  with PMH as below is here for HFU  Patient of Dr. Trejo  - Patient was admitted 11/11 - 11/16/2024 with complaint of SOB, dizziness, diarrhea, vomiting. -150, ECG with new onset A-fib with RVR.  Troponin x 2 negative.  She received IV Cardizem and on repeat EKG converted to NSR.  New onset A-fib likely secondary to electrolyte imbalance, diarrhea and acute COPD exacerbation.  [id number] of 2 (HTN, age), started on Eliquis 5 mg twice daily and Lopressor 50 mg twice daily  - She returns to the ED on 11/23/2024 with complaint of shortness of breath and bilateral leg swelling. Troponin x 2 negative.  , was 76 one month ago.  Chest x-ray showed small right basilar lung density just above the diaphragm which is new from prior and is probably some focal atelectasis. No other acute findings.  She received IV Lasix 40 mg once and was discharged on Lasix 20 mg twice daily    Today,  she reports feeling well. Does have mild SOB because COPD but overall stable. Lost weight about 9 lbs, SOB and leg swelling resolved. She smoked approximately one half pack cigarettes per day for over 50 years and has cut down to 2 cigarettes daily. Patient has cut down from 40 oz beer  to 25 oz daily    Currently fluid intake approximately: 32 Oz water , 8 oz coffee, 10 oz low sodium V8, 1 can of beer 25 oz   Has cut down on sodium intake at home      Patient Active Problem List    Diagnosis Date Noted    New onset a-fib ([facility]) 11/12/2024    Diarrhea 11/12/2024    Hyponatremia 11/12/2024    SIRS (systemic inflammatory response syndrome) ([facility]) 11/12/2024    Hypomagnesemia 11/12/2024    increased anion gap (IAG) 11/12/2024    Elevated LFTs 05/17/2022    Recurrent major depressive disorder ([facility]) 12/03/2021    Essential hypertension 09/15/2021    Epidermal inclusion cyst 08/25/2021    Mixed hyperlipidemia 06/04/2021    Vitamin D deficiency 06/04/2021    Insomnia 05/28/2021    ETOH abuse 05/28/2021    Anxiety 05/28/2021    Restless leg syndrome 05/28/2021    MDD (major depressive disorder) 11/17/2020    Chronic obstructive pulmonary disease with acute exacerbation ([facility])     Nicotine dependence 08/12/2016    Adrenal incidentaloma, Left 08/12/2016     Past Medical History:   Diagnosis Date    Anxiety     COPD (chronic obstructive pulmonary disease) ([facility])     COPD exacerbation ([facility]) 8/12/2016    Dental abscess 10/2020    Depression     ETOH abuse     Facial abscess 8/12/2016     Social History     Tobacco Use    Smoking status: Every Day     Current packs/day: 0.25     Types: Cigarettes    Smokeless tobacco: Never    Tobacco comments:     smokes 2 cigs a day 11/18/2024   Vaping Use    Vaping status: Never Used   Substance Use Topics    Alcohol use: Not Currently     Comment: states drank today had some beer a couple hours ago    Drug use: No      Family History   Problem Relation Age of Onset    Brain cancer Maternal Aunt     Cancer Other     Substance Abuse Neg Hx     Mental illness Neg Hx      No past surgical history on file.    Current Outpatient Medications:     albuterol (PROVENTIL HFA,VENTOLIN HFA) 90 mcg/act inhaler, Inhale 2 puffs every 6 (six) hours as needed for wheezing, Disp: 6.7 g, Rfl: 0    apixaban  (ELIQUIS) 5 mg, Take 1 tablet (5 mg total) by mouth 2 (two) times a day, Disp: 60 tablet, Rfl: 0    ergocalciferol (VITAMIN D2) 50,000 units, Take 1 capsule (50,000 Units total) by mouth once a week, Disp: 12 capsule, Rfl: 3    FLUoxetine (PROzac) 20 mg capsule, Take 20 mg by mouth daily, Disp: , Rfl:     furosemide (LASIX) 20 mg tablet, Take 1 tablet (20 mg total) by mouth 2 (two) times a day, Disp: 20 tablet, Rfl: 0    ipratropium-albuterol (DUO-NEB) 0.5-2.5 mg/3 mL nebulizer solution, Take 3 mL by nebulization 4 (four) times a day, Disp: 360 mL, Rfl: 0    lisinopril (ZESTRIL) 20 mg tablet, TAKE 1 TABLET (20 MG TOTAL) BY MOUTH DAILY, Disp: 90 tablet, Rfl: 1    metoprolol tartrate (LOPRESSOR) 50 mg tablet, Take 1 tablet (50 mg total) by mouth every 12 (twelve) hours, Disp: 60 tablet, Rfl: 0    nicotine (NICODERM CQ) 7 mg/24hr TD 24 hr patch, Place 1 patch on the skin over 24 hours daily Do not start before November 17, 2024., Disp: 28 patch, Rfl: 0    OLANZapine (ZyPREXA) 10 mg tablet, Take 1 tablet (10 mg total) by mouth every morning, Disp: 30 tablet, Rfl: 0    rosuvastatin (CRESTOR) 20 MG tablet, TAKE 1 TABLET (20 MG TOTAL) BY MOUTH DAILY, Disp: 90 tablet, Rfl: 1    albuterol (Ventolin HFA) 90 mcg/act inhaler, Inhale 2 puffs every 6 (six) hours as needed for wheezing (Patient not taking: Reported on 12/12/2024), Disp: 18 g, Rfl: 0    Budeson-Glycopyrrol-Formoterol (Breztri Aerosphere) 160-9-4.8 MCG/ACT AERO, Inhale 2 puffs in the morning Rinse mouth after use. (Patient not taking: Reported on 11/23/2024), Disp: 10.7 g, Rfl: 0    fluticasone-umeclidinium-vilanterol (Trelegy Ellipta) 200-62.5-25 mcg/actuation AEPB inhaler, Inhale 1 puff daily Rinse mouth after use. (Patient not taking: Reported on 11/23/2024), Disp: 60 blister, Rfl: 0    rOPINIRole (REQUIP) 0.5 mg tablet, Take 1 tablet (0.5 mg total) by mouth daily at bedtime (Patient not taking: Reported on 11/23/2024), Disp: 30 tablet, Rfl: 0    Allergies    Allergen Reactions    Bee Venom        Vitals:    12/12/24 1501 12/12/24 1515   BP: 100/60 108/68   BP Location: Left arm Right arm   Patient Position: Sitting Sitting   Cuff Size: Standard Standard   Pulse: 58    SpO2: 98%    Weight: 71.7 kg (158 lb)    Height: 5' (1.524 m)         Vitals:    12/12/24 1501   Weight: 71.7 kg (158 lb)      Height: 5' (152.4 cm)   Body mass index is 30.86 kg/m².    Labs:   Lab Results   Component Value Date/Time    CHOLESTEROL 229 (H) 06/14/2024 10:00 AM    CHOLESTEROL 227 (H) 06/28/2023 12:34 PM    TRIG 168 (H) 06/14/2024 10:00 AM    TRIG 145 06/28/2023 12:34 PM    HDL 81 06/14/2024 10:00 AM     06/28/2023 12:34 PM    LDLCALC 114 (H) 06/14/2024 10:00 AM    LDLCALC 100 (H) 06/28/2023 12:34 PM      Lab Results   Component Value Date    SODIUM 132 (L) 11/23/2024    K 3.5 11/23/2024    CL 93 (L) 11/23/2024    CREATININE 0.64 11/23/2024    EGFR 96 11/23/2024    BUN 5 11/23/2024    CO2 31 11/23/2024    ALT 65 (H) 11/23/2024    AST 36 11/23/2024    TSH 2.350 04/21/2022    INR 1.00 11/23/2024    GLUF 129 (H) 06/14/2024    HGBA1C 5.9 (H) 03/19/2024    WBC 22.96 (H) 11/23/2024    HGB 12.5 11/23/2024    HCT 38.1 11/23/2024     11/23/2024         Imaging: XR chest 1 view portable  Result Date: 11/23/2024  Narrative: XR CHEST PORTABLE INDICATION: SOB. COMPARISON: 11/11/2024 and 10/4/2020 FINDINGS: Very small area of focal density just above the right hemidiaphragm is new since the prior exam and is most likely just a small area of atelectasis. Otherwise, the lungs appear normal. Cardiac monitor leads project mostly over the left side of the chest.  No pneumothorax or pleural effusion. Normal cardiomediastinal silhouette. Bones are unremarkable for age. Normal upper abdomen.     Impression: Small right basilar lung density just above the diaphragm which is new from prior and is probably some focal atelectasis. No other acute findings. Follow-up as deemed clinically appropriate  Workstation performed: [id number]       ECG: Sinus bradycardia.  ST and T wave abnormality, consider anterior ischemia.  58 bpm  Reviewed by LADONNA Smith      Review of Systems   Constitutional: Negative for chills, diaphoresis, fever and malaise/fatigue.   Cardiovascular:  Negative for chest pain, dyspnea on exertion, leg swelling, orthopnea, palpitations and syncope.   Respiratory:  Positive for shortness of breath (COPD). Negative for cough.    Gastrointestinal:  Negative for abdominal pain, nausea and vomiting.   Neurological:  Negative for dizziness, headaches and light-headedness.   Psychiatric/Behavioral:  Negative for altered mental status.    All other systems reviewed and are negative.    Except as noted in HPI, is otherwise reviewed in detail and a 12 point review of systems is negative.    Physical Exam  Vitals reviewed.   Constitutional:       General: She is not in acute distress.     Appearance: Normal appearance. She is not diaphoretic.   HENT:      Head: Normocephalic and atraumatic.   Eyes:      General: No scleral icterus.     Extraocular Movements: Extraocular movements intact.   Cardiovascular:      Rate and Rhythm: Regular rhythm. Bradycardia present.      Pulses: Normal pulses.           Radial pulses are 2+ on the right side and 2+ on the left side.      Heart sounds: Normal heart sounds, S1 normal and S2 normal.   Pulmonary:      Effort: Pulmonary effort is normal. No tachypnea or respiratory distress.      Breath sounds: Decreased breath sounds present. No wheezing or rales.   Abdominal:      General: Bowel sounds are normal. There is no distension.      Palpations: Abdomen is soft.   Musculoskeletal:      Right lower leg: No edema.      Left lower leg: No edema.   Skin:     General: Skin is warm and dry.      Capillary Refill: Capillary refill takes less than 2 seconds.   Neurological:      Mental Status: She is alert and oriented to person, place, and time.      Gait: Gait normal.    Psychiatric:         Mood and Affect: Mood normal.         Behavior: Behavior normal.          **Please Note: This note may have been constructed using a voice recognition system**     Thank you for the opportunity to participate in the care of this patient.   LADONNA Smith

## 2024-12-12 NOTE — ASSESSMENT & PLAN NOTE
Has cut down to 2 cigarettes daily. She will continue to work toward complete cessation. She is using nicotine patches.

## 2024-12-12 NOTE — PATIENT INSTRUCTIONS
Repeat blood work BMP in 1 week   Cut down on alcohol use  Monitor weight daily. Call us if you gain 3 lbs in 1 day or 5 lbs in 5-7 days

## 2024-12-13 ENCOUNTER — OFFICE VISIT (OUTPATIENT)
Dept: FAMILY MEDICINE CLINIC | Facility: CLINIC | Age: 59
End: 2024-12-13
Payer: COMMERCIAL

## 2024-12-13 ENCOUNTER — APPOINTMENT (OUTPATIENT)
Dept: LAB | Facility: CLINIC | Age: 59
End: 2024-12-13
Payer: COMMERCIAL

## 2024-12-13 VITALS
HEART RATE: 60 BPM | SYSTOLIC BLOOD PRESSURE: 132 MMHG | TEMPERATURE: 94 F | RESPIRATION RATE: 18 BRPM | WEIGHT: 157.2 LBS | OXYGEN SATURATION: 99 % | DIASTOLIC BLOOD PRESSURE: 70 MMHG | BODY MASS INDEX: 30.7 KG/M2

## 2024-12-13 DIAGNOSIS — F17.210 CIGARETTE NICOTINE DEPENDENCE WITHOUT COMPLICATION: ICD-10-CM

## 2024-12-13 DIAGNOSIS — E78.2 MIXED HYPERLIPIDEMIA: ICD-10-CM

## 2024-12-13 DIAGNOSIS — J44.1 CHRONIC OBSTRUCTIVE PULMONARY DISEASE WITH ACUTE EXACERBATION (HCC): ICD-10-CM

## 2024-12-13 DIAGNOSIS — E87.1 HYPONATREMIA: ICD-10-CM

## 2024-12-13 DIAGNOSIS — F10.10 ETOH ABUSE: ICD-10-CM

## 2024-12-13 DIAGNOSIS — I50.9 ACUTE CONGESTIVE HEART FAILURE, UNSPECIFIED HEART FAILURE TYPE (HCC): ICD-10-CM

## 2024-12-13 DIAGNOSIS — I48.91 NEW ONSET A-FIB (HCC): Primary | ICD-10-CM

## 2024-12-13 DIAGNOSIS — I10 ESSENTIAL HYPERTENSION: ICD-10-CM

## 2024-12-13 PROCEDURE — 99214 OFFICE O/P EST MOD 30 MIN: CPT | Performed by: NURSE PRACTITIONER

## 2024-12-13 RX ORDER — LISINOPRIL 20 MG/1
20 TABLET ORAL DAILY
Qty: 90 TABLET | Refills: 1 | Status: SHIPPED | OUTPATIENT
Start: 2024-12-13

## 2024-12-13 RX ORDER — ROSUVASTATIN CALCIUM 20 MG/1
20 TABLET, COATED ORAL DAILY
Qty: 90 TABLET | Refills: 1 | Status: SHIPPED | OUTPATIENT
Start: 2024-12-13

## 2024-12-13 NOTE — PROGRESS NOTES
Name: Sophie Jamil      : 1965      MRN: [id number]  Encounter Provider: LADONNA De  Encounter Date: 2024   Encounter department: [facility]  :  Assessment & Plan  New onset a-fib (HCC)  Managed by cardio  Eliquis to continue. Rate controlled. Continue metoprolol. Monitor.        Essential hypertension  Stable. Continue blood pressure medications as ordered.   Monitor blood pressure. Stress management. Regular exercise  Limit salt in diet.   Orders:    lisinopril (ZESTRIL) 20 mg tablet; Take 1 tablet (20 mg total) by mouth daily    Chronic obstructive pulmonary disease with acute exacerbation (HCC)  Stable currently. Monitor. Rescue inhaler 2 puffs or nebulizer treatments every 4-6 hours as needed for shortness breath, chest tightness, bronchospasm, coughing fits.   Recommend follow up with pulmonary       Cigarette nicotine dependence without complication  Tobacco Cessation Counseling: Tobacco cessation counseling and education was provided. The patient is sincerely urged to quit consumption of tobacco. She is not ready to quit tobacco. The numerous health risks of tobacco consumption were discussed. If she decides to quit, there are a number of helpful adjunctive aids, and she can see me to discuss nicotine replacement therapy, chantix, or bupropion anytime in the future.. I spent 5 minutes on Tobacco Cessation counseling during today's visit.        ETOH abuse  Counseled on risks of continue etoh use.   Recommended reduction and/or cessation.  Pt verbalized understanding.        Mixed hyperlipidemia  Heart healthy diet. Statin.  Orders:    rosuvastatin (CRESTOR) 20 MG tablet; Take 1 tablet (20 mg total) by mouth daily    Hyponatremia  Most likely secondary to etoh use.   Labs ordered. Monitor.        Acute congestive heart failure, unspecified heart failure type (HCC)  Wt Readings from Last 3 Encounters:   24 71.3 kg (157 lb 3.2 oz)   24 71.7 kg (158  "lb)   11/23/24 76.2 kg (167 lb 15.9 oz)   Diuretics. Monitor weight. Limit salt. Managed by cardio                 Patient was counseled regarding instructions for management which included: impression/diagnosis, risk/benefits of treatment options, importance of compliance with treatment, risk factor reduction, and prognosis.   I have reviewed the instructions with the patient answering all questions and patient verbalized understanding.         History of Present Illness     Here for hospital fu and subsequent ED visit.   Recent new dx afib, CHF  Ongoing issues with ETOH   Saw cardiology yesterday as hospital follow up  Initially presented to ED on 11/11 with sob. Diagnosed with exac copd, chf, new onset afib. Treated with IV steroids, abx, oxygen, nebulizer treatments, IV diuretics.   Follows with psych. On fluoxetine.   Admits to drinking \"only one can of beer a couple of time a week\"  Using nebulizer treatments three times daily.   Still smoking, 2 cigarettes per day.   Lives alone. Does have issues with transportation but does have a friend who has been bringing her to appts.   No home oxygen.   Taking meds as prescribed. Has lost 9-10 lbs since started lasix.   States feels well currently. Denies sob.           Review of Systems   Constitutional:  Negative for unexpected weight change.   Respiratory:  Negative for chest tightness and shortness of breath.    Cardiovascular:  Negative for chest pain and palpitations.   Gastrointestinal:  Negative for abdominal distention, diarrhea, nausea and vomiting.   Musculoskeletal:  Negative for arthralgias and myalgias.   Skin:  Negative for rash and wound.   Neurological:  Negative for dizziness and headaches.   Psychiatric/Behavioral:  The patient is nervous/anxious.        Objective   /70   Pulse 60   Temp (!) 94 °F (34.4 °C)   Resp 18   Wt 71.3 kg (157 lb 3.2 oz)   SpO2 99%   BMI 30.70 kg/m²      Hospital records reviewed,   JULIAW, admit 11/11, disch " 11/16  Reason for Admission: New onset afib   Hospital Course:   Sophie Jamil is a 59 y.o. female patient who originally presented to the hospital on 11/11/2024 due to shortness of breath, dizziness, nausea and vomiting, diarrhea.  On initial workup in the ER patient was found to have new onset atrial fibrillation and likely to be in COPD exacerbation.  Patient was admitted to the hospital and started on therapeutic Lovenox, Cardizem, cardiology consultation.  Cardiology following and discontinue Cardizem and started patient on metoprolol to tartrate 50 mg twice daily.  Patient's heart rate now controlled.  Patient was transitioned to Eliquis 5 mg twice daily.  Echo done showed ejection fraction of 68% with normal systolic and diastolic function.  Referral for outpatient cardiology follow-up after discharge.  Patient also with acute respiratory failure in the setting of COPD.  She was requiring 2 L nasal cannula and started on scheduled nebulizers, IV Solu-Medrol, azithromycin.  Pulmonology consulted and following.  Patient completed 3-day course of azithromycin.  CT negative for any evidence of pneumonia.  Patient was transition to oral prednisone taper at discharge.  Patient will be discharged with as needed nebulizer treatments, albuterol inhaler, Symbicort inhaler with pending [facility] prior authorization.  Home O2 evaluation completed.  Case management following for safe discharge planning.  Referral for pulmonology follow-up after discharge.  Patient reports that breathing is much improved on day of discharge and denies any cardiac complaints.  Stable for discharge home today.  All patient and family questions answered to the best of my ability.  ED records reviewed, slw, 11/23  Shortness of Breath        Pt arrives from home via EMS c/o SOB that began this morning after a restless night sleep. Had neb treatment at home with no relief. Took meds including prednisone this morning. Upon arrival to ED O2 sat at  100% on RA. Hx of anxiety.     Patient has long history of COPD which appears to be steroid-dependent. She is currently on a steroid wean. Patient was hospitalized 2 weeks ago with new onset atrial fibrillation. She states she has been taking her medications including the new ones regularly. Patient states she had a restless night due to anxiety last night. She felt nervous and tremulous and did not get much sleep. This morning she noted shortness of breath without chest pain. Patient took her prednisone and also used a nebulizer treatment without improvement. No fever. No chest pain or palpitations. She does have bilateral pitting edema which she states is new. No change in her chronic cough     Recent Results (from the past 4 weeks)   Adult Nebulizer Reusable Package    Collection Time: 11/15/24 11:57 AM   Result Value Ref Range    Supplier Name [employer]     Supplier Phone Number [phone]     Order Status Delivery Successful     Delivery Note       [facility] please advise of any co-pay    Delivery Request Date 11/15/2024     Date Delivered  11/15/2024     Item Description       Nebulizer Compressor for Reusable Package with Mouthpiece Only    Item Description Nebulizer Set, Reusable     Item Description Mouthpiece Only, N/A    CBC    Collection Time: 11/16/24  4:54 AM   Result Value Ref Range    WBC 13.31 (H) 4.31 - 10.16 Thousand/uL    RBC 3.47 (L) 3.81 - 5.12 Million/uL    Hemoglobin 11.3 (L) 11.5 - 15.4 g/dL    Hematocrit 34.8 34.8 - 46.1 %     (H) 82 - 98 fL    MCH 32.6 26.8 - 34.3 pg    MCHC 32.5 31.4 - 37.4 g/dL    RDW 13.3 11.6 - 15.1 %    Platelets 244 149 - 390 Thousands/uL    MPV 10.7 8.9 - 12.7 fL   Basic metabolic panel    Collection Time: 11/16/24  4:54 AM   Result Value Ref Range    Sodium 138 135 - 147 mmol/L    Potassium 3.9 3.5 - 5.3 mmol/L    Chloride 104 96 - 108 mmol/L    CO2 28 21 - 32 mmol/L    ANION GAP 6 4 - 13 mmol/L     BUN 15 5 - 25 mg/dL    Creatinine 0.61 0.60 - 1.30 mg/dL    Glucose 156 (H) 65 - 140 mg/dL    Calcium 8.6 8.4 - 10.2 mg/dL    eGFR 98 ml/min/1.73sq m   ECG 12 lead    Collection Time: 11/23/24  8:35 AM   Result Value Ref Range    Ventricular Rate 51 BPM    Atrial Rate  BPM    TN Interval  ms    QRSD Interval 54 ms    QT Interval 462 ms    QTC Interval 426 ms    P Axis  degrees    QRS Axis 32 degrees    T Wave Axis 49 degrees   ECG 12 lead    Collection Time: 11/23/24  8:36 AM   Result Value Ref Range    Ventricular Rate 52 BPM    Atrial Rate 329 BPM    TN Interval  ms    QRSD Interval 64 ms    QT Interval 496 ms    QTC Interval 462 ms    P Axis  degrees    QRS Axis 44 degrees    T Wave Axis 68 degrees   CBC and differential    Collection Time: 11/23/24  8:41 AM   Result Value Ref Range    WBC 22.96 (H) 4.31 - 10.16 Thousand/uL    RBC 3.83 3.81 - 5.12 Million/uL    Hemoglobin 12.5 11.5 - 15.4 g/dL    Hematocrit 38.1 34.8 - 46.1 %     (H) 82 - 98 fL    MCH 32.6 26.8 - 34.3 pg    MCHC 32.8 31.4 - 37.4 g/dL    RDW 13.0 11.6 - 15.1 %    MPV 10.6 8.9 - 12.7 fL    Platelets 374 149 - 390 Thousands/uL    nRBC 0 /100 WBCs    Segmented % 74 43 - 75 %    Immature Grans % 1 0 - 2 %    Lymphocytes % 18 14 - 44 %    Monocytes % 7 4 - 12 %    Eosinophils Relative 0 0 - 6 %    Basophils Relative 0 0 - 1 %    Absolute Neutrophils 16.79 (H) 1.85 - 7.62 Thousands/µL    Absolute Immature Grans 0.31 (H) 0.00 - 0.20 Thousand/uL    Absolute Lymphocytes 4.13 0.60 - 4.47 Thousands/µL    Absolute Monocytes 1.63 (H) 0.17 - 1.22 Thousand/µL    Eosinophils Absolute 0.05 0.00 - 0.61 Thousand/µL    Basophils Absolute 0.05 0.00 - 0.10 Thousands/µL   Protime-INR    Collection Time: 11/23/24  8:41 AM   Result Value Ref Range    Protime 13.7 12.3 - 15.0 seconds    INR 1.00 0.85 - 1.19   APTT    Collection Time: 11/23/24  8:41 AM   Result Value Ref Range    PTT 26 23 - 34 seconds   Comprehensive metabolic panel    Collection Time: 11/23/24   "8:41 AM   Result Value Ref Range    Sodium 132 (L) 135 - 147 mmol/L    Potassium 3.5 3.5 - 5.3 mmol/L    Chloride 93 (L) 96 - 108 mmol/L    CO2 31 21 - 32 mmol/L    ANION GAP 8 4 - 13 mmol/L    BUN 5 5 - 25 mg/dL    Creatinine 0.64 0.60 - 1.30 mg/dL    Glucose 104 65 - 140 mg/dL    Calcium 9.1 8.4 - 10.2 mg/dL    AST 36 13 - 39 U/L    ALT 65 (H) 7 - 52 U/L    Alkaline Phosphatase 63 34 - 104 U/L    Total Protein 6.1 (L) 6.4 - 8.4 g/dL    Albumin 3.8 3.5 - 5.0 g/dL    Total Bilirubin 0.44 0.20 - 1.00 mg/dL    eGFR 96 ml/min/1.73sq m   HS Troponin 0hr (reflex protocol)    Collection Time: 11/23/24  8:41 AM   Result Value Ref Range    hs TnI 0hr 5 \"Refer to ACS Flowchart\"- see link ng/L   B-Type Natriuretic Peptide(BNP)    Collection Time: 11/23/24  8:41 AM   Result Value Ref Range     (H) 0 - 100 pg/mL   Procalcitonin    Collection Time: 11/23/24  8:41 AM   Result Value Ref Range    Procalcitonin <0.05 <=0.25 ng/ml   UA (URINE) with reflex to Scope    Collection Time: 11/23/24  9:50 AM   Result Value Ref Range    Color, UA Yellow     Clarity, UA Clear     Specific Gravity, UA 1.010 1.005 - 1.030    pH, UA 6.5 4.5, 5.0, 5.5, 6.0, 6.5, 7.0, 7.5, 8.0    Leukocytes, UA Moderate (A) Negative    Nitrite, UA Negative Negative    Protein, UA Negative Negative mg/dl    Glucose, UA Negative Negative mg/dl    Ketones, UA Negative Negative mg/dl    Urobilinogen, UA <2.0 <2.0 mg/dl mg/dl    Bilirubin, UA Negative Negative    Occult Blood, UA Small (A) Negative   Urine Microscopic    Collection Time: 11/23/24  9:50 AM   Result Value Ref Range    RBC, UA 2-4 None Seen, 0-1, 1-2, 2-4, 0-5 /hpf    WBC, UA 1-2 None Seen, 0-1, 1-2, 0-5, 2-4 /hpf    Epithelial Cells Occasional None Seen, Occasional /hpf    Bacteria, UA Occasional None Seen, Occasional /hpf   HS Troponin I 2hr    Collection Time: 11/23/24 10:54 AM   Result Value Ref Range    hs TnI 2hr 6 \"Refer to ACS Flowchart\"- see link ng/L    Delta 2hr hsTnI 1 <20 ng/L "     11/23/2024 CXR  IMPRESSION:   Small right basilar lung density just above the diaphragm which is new from prior and is probably some focal atelectasis. No other acute findings. Follow-up as deemed clinically appropriate  CTA chest 11/11  IMPRESSION:   No evidence of acute pulmonary embolus, thoracic aortic aneurysm or dissection. No acute cardiopulmonary process.  CT abd pelvis 11/11  IMPRESSION:   No acute inflammatory process identified in the abdomen or pelvis.   Hepatic steatosis.   Stable 3.4 cm left adrenal gland nodule.   Colonic diverticulosis without evidence of diverticulitis.  CXR 11/11  IMPRESSION:   No acute cardiopulmonary disease.    Physical Exam  Vitals reviewed.   Constitutional:       General: She is not in acute distress.  Neck:      Vascular: No carotid bruit.   Cardiovascular:      Rate and Rhythm: Normal rate. Rhythm irregular.   Pulmonary:      Effort: Pulmonary effort is normal.      Breath sounds: No wheezing or rales.      Comments: Somewhat decreased breath sounds throughout.   Abdominal:      General: There is no distension.      Palpations: Abdomen is soft.      Tenderness: There is no abdominal tenderness.   Musculoskeletal:      Right lower leg: No edema.      Left lower leg: No edema.   Skin:     General: Skin is warm and dry.      Coloration: Skin is not jaundiced or pale.   Neurological:      General: No focal deficit present.      Mental Status: She is alert and oriented to person, place, and time.   Psychiatric:         Behavior: Behavior normal.         Thought Content: Thought content normal.         Judgment: Judgment normal.      Comments: Affect flat  Dressed appropriately for the weather.   Pleasant . Cooperative. Appears anxious  Good eye contact.

## 2024-12-13 NOTE — ASSESSMENT & PLAN NOTE
Stable. Continue blood pressure medications as ordered.   Monitor blood pressure. Stress management. Regular exercise  Limit salt in diet.   Orders:    lisinopril (ZESTRIL) 20 mg tablet; Take 1 tablet (20 mg total) by mouth daily

## 2024-12-13 NOTE — PATIENT INSTRUCTIONS
Continue same medications  Follow up with cardiology as scheduled  Limit alcohol as discussed  Smoking cessation highly recommended.   Labwork as ordered.

## 2024-12-13 NOTE — ASSESSMENT & PLAN NOTE
Counseled on risks of continue etoh use.   Recommended reduction and/or cessation.  Pt verbalized understanding.

## 2024-12-13 NOTE — ASSESSMENT & PLAN NOTE
Tobacco Cessation Counseling: Tobacco cessation counseling and education was provided. The patient is sincerely urged to quit consumption of tobacco. She is not ready to quit tobacco. The numerous health risks of tobacco consumption were discussed. If she decides to quit, there are a number of helpful adjunctive aids, and she can see me to discuss nicotine replacement therapy, chantix, or bupropion anytime in the future.. I spent 5 minutes on Tobacco Cessation counseling during today's visit.

## 2024-12-13 NOTE — ASSESSMENT & PLAN NOTE
Stable currently. Monitor. Rescue inhaler 2 puffs or nebulizer treatments every 4-6 hours as needed for shortness breath, chest tightness, bronchospasm, coughing fits.   Recommend follow up with pulmonary

## 2024-12-13 NOTE — ASSESSMENT & PLAN NOTE
Heart healthy diet. Statin.  Orders:    rosuvastatin (CRESTOR) 20 MG tablet; Take 1 tablet (20 mg total) by mouth daily

## 2024-12-17 ENCOUNTER — TELEPHONE (OUTPATIENT)
Dept: CARDIOLOGY CLINIC | Facility: CLINIC | Age: 59
End: 2024-12-17

## 2024-12-17 DIAGNOSIS — J44.1 CHRONIC OBSTRUCTIVE PULMONARY DISEASE WITH ACUTE EXACERBATION (HCC): ICD-10-CM

## 2024-12-17 RX ORDER — IPRATROPIUM BROMIDE AND ALBUTEROL SULFATE 2.5; .5 MG/3ML; MG/3ML
3 SOLUTION RESPIRATORY (INHALATION) 4 TIMES DAILY
Qty: 360 ML | Refills: 0 | Status: SHIPPED | OUTPATIENT
Start: 2024-12-17

## 2024-12-17 NOTE — TELEPHONE ENCOUNTER
PT called to inquire if neb solution refill sent to the pharmacy. PT requesting to possibly to please send order in this evening.    Please advise    ThankYou

## 2024-12-17 NOTE — TELEPHONE ENCOUNTER
----- Message from LADONNA Smith sent at 12/17/2024 11:15 AM EST -----  Please call patient  Her sodium levels have improved. Continue to cut down on alcohol use.     Thank you

## 2025-01-02 ENCOUNTER — TELEPHONE (OUTPATIENT)
Age: 60
End: 2025-01-02

## 2025-01-02 NOTE — TELEPHONE ENCOUNTER
"Val from [facility] called to confirm pt's lasix dose. Advised per last ov 12/12/24 w/ J Bryce DOUGHERTY \"We will continue on Lasix 20 mg daily, may have an extra 20 mg as needed for signs of fluid overload (weight gain, increased sob and leg edema, ) . Val verbalized understanding of medication instructions. No further questions.   "

## 2025-01-21 DIAGNOSIS — J44.1 CHRONIC OBSTRUCTIVE PULMONARY DISEASE WITH ACUTE EXACERBATION (HCC): ICD-10-CM

## 2025-01-21 RX ORDER — IPRATROPIUM BROMIDE AND ALBUTEROL SULFATE 2.5; .5 MG/3ML; MG/3ML
3 SOLUTION RESPIRATORY (INHALATION) 4 TIMES DAILY
Qty: 360 ML | Refills: 0 | Status: SHIPPED | OUTPATIENT
Start: 2025-01-21

## 2025-01-21 NOTE — TELEPHONE ENCOUNTER
Requested Prescriptions     Pending Prescriptions Disp Refills    ipratropium-albuterol (DUO-NEB) 0.5-2.5 mg/3 mL nebulizer solution 360 mL 0     Sig: Take 3 mL by nebulization 4 (four) times a day

## 2025-02-25 DIAGNOSIS — J44.1 CHRONIC OBSTRUCTIVE PULMONARY DISEASE WITH ACUTE EXACERBATION (HCC): ICD-10-CM

## 2025-02-25 RX ORDER — IPRATROPIUM BROMIDE AND ALBUTEROL SULFATE 2.5; .5 MG/3ML; MG/3ML
3 SOLUTION RESPIRATORY (INHALATION) 4 TIMES DAILY
Qty: 360 ML | Refills: 3 | Status: SHIPPED | OUTPATIENT
Start: 2025-02-25

## 2025-02-28 DIAGNOSIS — F17.210 CIGARETTE NICOTINE DEPENDENCE WITHOUT COMPLICATION: ICD-10-CM

## 2025-02-28 NOTE — TELEPHONE ENCOUNTER
Patient called to check the status of her refill request for nicotine patches. Patient made aware medication was refilled on 02/28/25 for 28 patches at [facility]. Patient instructed to contact the pharmacy to obtain refills of medication. Patient verbalized understanding.

## 2025-02-28 NOTE — TELEPHONE ENCOUNTER
Patient originally received the medication in the hospital    Reason for call:   [x] Refill   [] Prior Auth  [] Other:     Office:   [x] PCP/Provider - Eve Connors  [] Specialty/Provider -     Medication: nicotine (NICODERM CQ) 7 mg/24hr TD 24 hr patch    Dose/Frequency:  Place 1 patch on the skin over 24 hours daily     Quantity: 28    Pharmacy: [address]     Does the patient have enough for 3 days?   [x] Yes   [] No - Send as HP to POD

## 2025-03-21 ENCOUNTER — TELEPHONE (OUTPATIENT)
Dept: FAMILY MEDICINE CLINIC | Facility: CLINIC | Age: 60
End: 2025-03-21

## 2025-03-21 DIAGNOSIS — E55.9 VITAMIN D DEFICIENCY: ICD-10-CM

## 2025-03-21 DIAGNOSIS — R73.9 ELEVATED SERUM GLUCOSE: ICD-10-CM

## 2025-03-21 DIAGNOSIS — E78.2 MIXED HYPERLIPIDEMIA: ICD-10-CM

## 2025-03-21 DIAGNOSIS — I10 ESSENTIAL HYPERTENSION: Primary | ICD-10-CM

## 2025-03-21 DIAGNOSIS — F17.210 CIGARETTE NICOTINE DEPENDENCE WITHOUT COMPLICATION: ICD-10-CM

## 2025-03-21 DIAGNOSIS — E83.42 HYPOMAGNESEMIA: ICD-10-CM

## 2025-03-21 DIAGNOSIS — E87.1 HYPONATREMIA: ICD-10-CM

## 2025-03-21 DIAGNOSIS — F10.10 ETOH ABUSE: ICD-10-CM

## 2025-03-21 DIAGNOSIS — R79.89 ELEVATED LFTS: ICD-10-CM

## 2025-03-21 NOTE — TELEPHONE ENCOUNTER
LMOM for patient to call the office back. She is due for her annual physical appt next week and we were wondering if we could cancel todays appt and just schedule her annual physical for next week or the week after and combine the appts rather than her coming into the office twice.  Will need labs done prior to physical appt.  Please transfer to office.

## 2025-03-21 NOTE — TELEPHONE ENCOUNTER
Patient called and changed her appt to 4/8. Will be physical appt and labs need to be done prior to appt.  Mailed copy of lab orders to patient with note to complete labs 1 week prior to appt. ELIAS

## 2025-04-10 ENCOUNTER — TELEPHONE (OUTPATIENT)
Age: 60
End: 2025-04-10

## 2025-04-10 ENCOUNTER — TELEPHONE (OUTPATIENT)
Dept: FAMILY MEDICINE CLINIC | Facility: CLINIC | Age: 60
End: 2025-04-10

## 2025-04-10 DIAGNOSIS — J44.1 CHRONIC OBSTRUCTIVE PULMONARY DISEASE WITH ACUTE EXACERBATION (HCC): ICD-10-CM

## 2025-04-10 RX ORDER — IPRATROPIUM BROMIDE AND ALBUTEROL SULFATE 2.5; .5 MG/3ML; MG/3ML
3 SOLUTION RESPIRATORY (INHALATION) 4 TIMES DAILY
Qty: 360 ML | Refills: 3 | Status: SHIPPED | OUTPATIENT
Start: 2025-04-10

## 2025-04-10 RX ORDER — ALBUTEROL SULFATE 90 UG/1
2 INHALANT RESPIRATORY (INHALATION) EVERY 6 HOURS PRN
Qty: 18 G | Refills: 0 | Status: SHIPPED | OUTPATIENT
Start: 2025-04-10

## 2025-04-10 NOTE — TELEPHONE ENCOUNTER
----- Message from LADONNA De sent at 4/8/2025  3:33 PM EDT -----  Regarding: labs  Pt has upcoming appt for physical and lab review on 4/11. Labs have not been done. . Needs to have labs done prior to appt. Orders in chart.   Please call pt to advise.

## 2025-04-10 NOTE — TELEPHONE ENCOUNTER
Patient called in requesting refill of albuterol inhaler. Did not see an inhaler ordered by PCP only nebulizer solution. Patient asked if Eve is able to order inhaler to [facility] pharmacy.    Please advise, thank you

## 2025-05-05 ENCOUNTER — TELEPHONE (OUTPATIENT)
Dept: FAMILY MEDICINE CLINIC | Facility: CLINIC | Age: 60
End: 2025-05-05

## 2025-05-05 NOTE — TELEPHONE ENCOUNTER
Pt has upcoming appt for fu, med check, lab review, and physical on 5/8. Labs have not been done.  Needs to have labs done prior to appt. Orders in chart.   Please call pt to advise.

## 2025-05-16 DIAGNOSIS — E55.9 VITAMIN D DEFICIENCY: ICD-10-CM

## 2025-05-19 DIAGNOSIS — J44.9 CHRONIC OBSTRUCTIVE PULMONARY DISEASE, UNSPECIFIED COPD TYPE (HCC): ICD-10-CM

## 2025-05-19 DIAGNOSIS — J45.909 MILD REACTIVE AIRWAYS DISEASE, UNSPECIFIED WHETHER PERSISTENT: ICD-10-CM

## 2025-05-19 RX ORDER — ERGOCALCIFEROL 1.25 MG/1
50000 CAPSULE, LIQUID FILLED ORAL WEEKLY
Qty: 12 CAPSULE | Refills: 8 | OUTPATIENT
Start: 2025-05-19

## 2025-05-19 NOTE — TELEPHONE ENCOUNTER
Please call pt  Vitamin D level has not been checked in a year.   She is overdue for labs  This refill request is for refill on vitamin D supplement.   Needs to get labs done so that I can continue to refill meds, etc

## 2025-05-20 RX ORDER — ALBUTEROL SULFATE 90 UG/1
2 INHALANT RESPIRATORY (INHALATION) EVERY 6 HOURS PRN
Qty: 6.7 G | Refills: 0 | Status: SHIPPED | OUTPATIENT
Start: 2025-05-20

## 2025-05-27 ENCOUNTER — TELEPHONE (OUTPATIENT)
Age: 60
End: 2025-05-27

## 2025-05-27 NOTE — TELEPHONE ENCOUNTER
Patient and her significant other Amol calling today as she is unable to make it to her appointment. Patient is sick, but next availability isn't until November so they are looking for guidance on her current medications Metoprolol and Eliquis, and if she should continue taking these medications. She denies symptoms or concerns on medication. Please advise.

## 2025-05-28 NOTE — TELEPHONE ENCOUNTER
Spoke with Amol-he will encourage patient to take her medications consistently everyday and to have her labs done.

## 2025-05-29 ENCOUNTER — NURSE TRIAGE (OUTPATIENT)
Age: 60
End: 2025-05-29

## 2025-05-29 NOTE — TELEPHONE ENCOUNTER
Received a call from Vasquez-significant other, and he was calling in for his girlfriend and had questions about her water pill    HIPAA consent unable to review, , and not updated.    Advised Vasquez best plan at this time would have her call into the office to give verbal consent so we can speak to you about her care.   Verbally understood    Last office visit 2024  Apt scheduled in November with Dr Morocho     Please follow up with pt.

## 2025-05-29 NOTE — TELEPHONE ENCOUNTER
"Reason for Disposition  • Caller has NON-URGENT medicine question about med that PCP or specialist prescribed and triager unable to answer question     Change Lasix to prn?  Recommend an acid blocker.  Asking if home health visits can be restarted to monitor VS.    Answer Assessment - Initial Assessment Questions  1. NAME of MEDICINE: \"What medicine(s) are you calling about?\"      Lasix  2. QUESTION: \"What is your question?\" (e.g., double dose of medicine, side effect)      Should Sophie continue to take the water pill? I'm worried about her getting dehydrated  3. PRESCRIBER: \"Who prescribed the medicine?\" Reason: if prescribed by specialist, call should be referred to that group.      Rosey DOUGHERTY  4. SYMPTOMS: \"Do you have any symptoms?\" If Yes, ask: \"What symptoms are you having?\"  \"How bad are the symptoms (e.g., mild, moderate, severe)      Weight loss, poor appetite, GI symptoms     Spoke with Vasquez, with patient's verbal consent, inquiring if Sophie should continue to take the Lasix 20mg daily, he is concerned about her becoming dehydrated; also has a poor appetite, complains of a \"sour stomach\", and weight loss.    Advised will ask if Lasix to continue daily or be amended & also about what to take for the sour stomach.    Vasquez admits he does not give her the Lasix daily, sometimes its every other day.    Weight today 138lbs, no edema, no SOB.    Protocols used: Medication Question Call-Adult-OH    "

## 2025-05-29 NOTE — TELEPHONE ENCOUNTER
"REASON FOR CONVERSATION: Medication Problem (questions)  Lasix  SYMPTOMS: poor appetite, weight loss, 'sour stomach\"    OTHER HEALTH INFORMATION: wt today 138lbs. Ordered Lasix 20mg daily, asking if should stay daily (does not take every day)?    PROTOCOL DISPOSITION: Callback by PCP Today    CARE ADVICE PROVIDED: will discuss questions/concerns with provider and call back.    PRACTICE FOLLOW-UP: Can appointment be scheduled sooner than November to establish care with physician? Was to have a 3mos f/u but needed to cancel 5/27/25.    Can Lasix be changed to few times weekly or prn wt gain/swelling?    Recommendations & script sent for acid blocker for sour stomach.    Inquiring if home health visits to monitor VS, assessments can be reordered.        "

## 2025-05-30 NOTE — TELEPHONE ENCOUNTER
Called mitchell back, made him aware of recommendation. He will take her to get labs. Asked they be placed on waitlist for sooner apt.

## 2025-06-11 DIAGNOSIS — J45.909 MILD REACTIVE AIRWAYS DISEASE, UNSPECIFIED WHETHER PERSISTENT: ICD-10-CM

## 2025-06-11 DIAGNOSIS — J44.9 CHRONIC OBSTRUCTIVE PULMONARY DISEASE, UNSPECIFIED COPD TYPE (HCC): ICD-10-CM

## 2025-06-11 RX ORDER — ALBUTEROL SULFATE 90 UG/1
2 INHALANT RESPIRATORY (INHALATION) EVERY 6 HOURS PRN
Qty: 6.7 G | Refills: 0 | Status: SHIPPED | OUTPATIENT
Start: 2025-06-11

## 2025-06-11 NOTE — TELEPHONE ENCOUNTER
Medication Refill Request       Medication: Albuterol inhaler    Dose/Frequency: 90 mcg/ 2 puffs every 6 hrs PRN    Quantity: 6.7 g    Pharmacy: [facility] pharmacy    Office:   [x] PCP/Provider -   [] Specialty/Provider -     Does the patient have enough for 3 days?   [] Yes   [x] No - Send as HP to POD

## 2025-06-13 ENCOUNTER — TELEPHONE (OUTPATIENT)
Age: 60
End: 2025-06-13

## 2025-06-13 NOTE — TELEPHONE ENCOUNTER
Received a call from Vasquez-significant other, and he was calling in for his girlfriend and had questions about her water pill, home health care needs and over all health.    Vasquez states that pt is drinking 4-25oz cans of bush beer every night and is back to smoking cigarettes.      HIPAA consent unable to review, , and not updated.     Advised Vasquez best plan at this time would have her call into the office to give verbal consent so we can speak to you about her care.   Verbally understood      Please follow up with pt

## 2025-06-16 NOTE — TELEPHONE ENCOUNTER
Have not seen this patient since long time.  Patient has been seen by nurse practitioner you should send message to her or patient should be seen by any provider family can also contact her medical MD

## 2025-06-20 DIAGNOSIS — E78.2 MIXED HYPERLIPIDEMIA: ICD-10-CM

## 2025-06-20 DIAGNOSIS — I10 ESSENTIAL HYPERTENSION: ICD-10-CM

## 2025-06-20 RX ORDER — LISINOPRIL 20 MG/1
20 TABLET ORAL DAILY
Qty: 90 TABLET | Refills: 1 | Status: SHIPPED | OUTPATIENT
Start: 2025-06-20

## 2025-06-20 RX ORDER — ROSUVASTATIN CALCIUM 20 MG/1
20 TABLET, COATED ORAL DAILY
Qty: 90 TABLET | Refills: 1 | Status: SHIPPED | OUTPATIENT
Start: 2025-06-20

## 2025-06-20 NOTE — TELEPHONE ENCOUNTER
Requested medication(s) are due for refill today: Yes  Patient has already received a courtesy refill: Yes  Other reason request has been forwarded to provider:   BP completed in the last 6 months    Valid encounter within last 6 months Pt scheduled for OV 7/15

## 2025-06-30 DIAGNOSIS — J45.909 MILD REACTIVE AIRWAYS DISEASE, UNSPECIFIED WHETHER PERSISTENT: ICD-10-CM

## 2025-06-30 DIAGNOSIS — E55.9 VITAMIN D DEFICIENCY: ICD-10-CM

## 2025-06-30 DIAGNOSIS — J44.1 CHRONIC OBSTRUCTIVE PULMONARY DISEASE WITH ACUTE EXACERBATION (HCC): ICD-10-CM

## 2025-06-30 DIAGNOSIS — J44.9 CHRONIC OBSTRUCTIVE PULMONARY DISEASE, UNSPECIFIED COPD TYPE (HCC): ICD-10-CM

## 2025-06-30 RX ORDER — ALBUTEROL SULFATE 90 UG/1
2 INHALANT RESPIRATORY (INHALATION) EVERY 6 HOURS PRN
Qty: 18 G | Refills: 0 | Status: CANCELLED | OUTPATIENT
Start: 2025-06-30

## 2025-06-30 RX ORDER — ALBUTEROL SULFATE 90 UG/1
2 INHALANT RESPIRATORY (INHALATION) EVERY 6 HOURS PRN
Qty: 6.7 G | Refills: 0 | Status: CANCELLED | OUTPATIENT
Start: 2025-06-30

## 2025-07-01 DIAGNOSIS — J45.909 MILD REACTIVE AIRWAYS DISEASE, UNSPECIFIED WHETHER PERSISTENT: ICD-10-CM

## 2025-07-01 DIAGNOSIS — J44.9 CHRONIC OBSTRUCTIVE PULMONARY DISEASE, UNSPECIFIED COPD TYPE (HCC): ICD-10-CM

## 2025-07-01 RX ORDER — ERGOCALCIFEROL 1.25 MG/1
50000 CAPSULE, LIQUID FILLED ORAL WEEKLY
Qty: 12 CAPSULE | Refills: 0 | Status: SHIPPED | OUTPATIENT
Start: 2025-07-01

## 2025-07-01 RX ORDER — ALBUTEROL SULFATE 90 UG/1
2 INHALANT RESPIRATORY (INHALATION) EVERY 6 HOURS PRN
Qty: 6.7 G | Refills: 0 | Status: CANCELLED | OUTPATIENT
Start: 2025-07-01

## 2025-07-01 RX ORDER — IPRATROPIUM BROMIDE AND ALBUTEROL SULFATE 2.5; .5 MG/3ML; MG/3ML
3 SOLUTION RESPIRATORY (INHALATION) 4 TIMES DAILY
Qty: 360 ML | Refills: 0 | Status: SHIPPED | OUTPATIENT
Start: 2025-07-01

## 2025-07-01 NOTE — TELEPHONE ENCOUNTER
Pt requested refills yesterday but the pharmacy didn't receive it yet. Pt would like an update. Please contact pt and advise. Thank you.

## 2025-07-02 ENCOUNTER — TELEPHONE (OUTPATIENT)
Dept: FAMILY MEDICINE CLINIC | Facility: CLINIC | Age: 60
End: 2025-07-02

## 2025-07-02 DIAGNOSIS — J44.9 CHRONIC OBSTRUCTIVE PULMONARY DISEASE, UNSPECIFIED COPD TYPE (HCC): ICD-10-CM

## 2025-07-02 DIAGNOSIS — J45.909 MILD REACTIVE AIRWAYS DISEASE, UNSPECIFIED WHETHER PERSISTENT: ICD-10-CM

## 2025-07-02 RX ORDER — ALBUTEROL SULFATE 90 UG/1
INHALANT RESPIRATORY (INHALATION)
Qty: 6.7 G | Refills: 0 | Status: SHIPPED | OUTPATIENT
Start: 2025-07-02

## 2025-07-02 NOTE — TELEPHONE ENCOUNTER
Checked with Dr Leung, appt is okay to reschedule. Rescheduled to 7/18/25 @ 10:30am with Dr Leung and patient was made aware. NFA

## 2025-07-02 NOTE — TELEPHONE ENCOUNTER
Patient would like to change her provider to Dr. Leung. She would like this change to be made for her upcoming physical on 7/15. Please advise.

## 2025-07-02 NOTE — TELEPHONE ENCOUNTER
1030 July 18th can we put patient in with Dr. Leung.?  She would like to change providers.    Please call to let them now if this date and time are ok

## 2025-07-21 DIAGNOSIS — J45.909 MILD REACTIVE AIRWAYS DISEASE, UNSPECIFIED WHETHER PERSISTENT: ICD-10-CM

## 2025-07-21 DIAGNOSIS — J44.9 CHRONIC OBSTRUCTIVE PULMONARY DISEASE, UNSPECIFIED COPD TYPE (HCC): ICD-10-CM

## 2025-07-21 RX ORDER — ALBUTEROL SULFATE 90 UG/1
2 INHALANT RESPIRATORY (INHALATION) EVERY 6 HOURS PRN
Qty: 6.7 G | Refills: 5 | Status: SHIPPED | OUTPATIENT
Start: 2025-07-21

## 2025-07-21 NOTE — TELEPHONE ENCOUNTER
Patient called in requesting a medication refill for her:    Requested Prescriptions     Pending Prescriptions Disp Refills    albuterol (PROVENTIL HFA,VENTOLIN HFA) 90 mcg/act inhaler 6.7 g 0     To be sent to [facility] on [address]. Patient stated she has enough for tomorrow and then she will be out of her medication.    Please advise once sent. Thank you.

## 2025-07-30 DIAGNOSIS — J45.909 MILD REACTIVE AIRWAYS DISEASE, UNSPECIFIED WHETHER PERSISTENT: ICD-10-CM

## 2025-07-30 DIAGNOSIS — J44.9 CHRONIC OBSTRUCTIVE PULMONARY DISEASE, UNSPECIFIED COPD TYPE (HCC): ICD-10-CM

## 2025-07-31 RX ORDER — ALBUTEROL SULFATE 90 UG/1
INHALANT RESPIRATORY (INHALATION)
Qty: 6.7 G | Refills: 4 | OUTPATIENT
Start: 2025-07-31

## 2025-08-06 ENCOUNTER — TELEPHONE (OUTPATIENT)
Age: 60
End: 2025-08-06